# Patient Record
Sex: MALE | Race: WHITE | NOT HISPANIC OR LATINO | Employment: PART TIME | URBAN - METROPOLITAN AREA
[De-identification: names, ages, dates, MRNs, and addresses within clinical notes are randomized per-mention and may not be internally consistent; named-entity substitution may affect disease eponyms.]

---

## 2024-08-25 ENCOUNTER — HOSPITAL ENCOUNTER (INPATIENT)
Facility: HOSPITAL | Age: 79
End: 2024-08-25
Attending: EMERGENCY MEDICINE | Admitting: STUDENT IN AN ORGANIZED HEALTH CARE EDUCATION/TRAINING PROGRAM
Payer: COMMERCIAL

## 2024-08-25 VITALS
TEMPERATURE: 97.2 F | BODY MASS INDEX: 32.2 KG/M2 | SYSTOLIC BLOOD PRESSURE: 138 MMHG | RESPIRATION RATE: 24 BRPM | DIASTOLIC BLOOD PRESSURE: 88 MMHG | OXYGEN SATURATION: 95 % | WEIGHT: 230 LBS | HEART RATE: 90 BPM | HEIGHT: 71 IN

## 2024-08-25 DIAGNOSIS — S81.809A MULTIPLE OPEN WOUNDS OF LOWER LEG, UNSPECIFIED LATERALITY, INITIAL ENCOUNTER: ICD-10-CM

## 2024-08-25 DIAGNOSIS — D62 ABLA (ACUTE BLOOD LOSS ANEMIA): ICD-10-CM

## 2024-08-25 DIAGNOSIS — M79.89 LEFT UPPER EXTREMITY SWELLING: ICD-10-CM

## 2024-08-25 DIAGNOSIS — M79.89 OTHER SPECIFIED SOFT TISSUE DISORDERS: ICD-10-CM

## 2024-08-25 DIAGNOSIS — R93.1 ABNORMAL FINDINGS ON DIAGNOSTIC IMAGING OF HEART AND CORONARY CIRCULATION: ICD-10-CM

## 2024-08-25 DIAGNOSIS — I50.9 CONGESTIVE HEART FAILURE, UNSPECIFIED HF CHRONICITY, UNSPECIFIED HEART FAILURE TYPE: ICD-10-CM

## 2024-08-25 DIAGNOSIS — R06.02 SOB (SHORTNESS OF BREATH): ICD-10-CM

## 2024-08-25 DIAGNOSIS — I50.9 ACUTE DECOMPENSATED HEART FAILURE: Primary | ICD-10-CM

## 2024-08-25 DIAGNOSIS — I46.9 CARDIAC ARREST: ICD-10-CM
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
## 2024-08-25 DIAGNOSIS — I50.21 ACUTE HFREF (HEART FAILURE WITH REDUCED EJECTION FRACTION): ICD-10-CM

## 2024-08-25 DIAGNOSIS — K92.1 MELENA: ICD-10-CM

## 2024-08-25 DIAGNOSIS — S40.022A CONTUSION OF LEFT UPPER EXTREMITY, INITIAL ENCOUNTER: ICD-10-CM

## 2024-08-25 DIAGNOSIS — R60.0 LOCALIZED EDEMA: ICD-10-CM

## 2024-08-25 LAB
ABO GROUP (TYPE) IN BLOOD: NORMAL
ALBUMIN SERPL BCP-MCNC: 3 G/DL (ref 3.4–5)
ALP SERPL-CCNC: 180 U/L (ref 33–136)
ALT SERPL W P-5'-P-CCNC: 38 U/L (ref 10–52)
AMPHETAMINES UR QL SCN: ABNORMAL
ANION GAP SERPL CALC-SCNC: 18 MMOL/L (ref 10–20)
ANTIBODY SCREEN: NORMAL
APPEARANCE UR: CLEAR
AST SERPL W P-5'-P-CCNC: 36 U/L (ref 9–39)
BARBITURATES UR QL SCN: ABNORMAL
BASOPHILS # BLD AUTO: 0.02 X10*3/UL (ref 0–0.1)
BASOPHILS NFR BLD AUTO: 0.1 %
BENZODIAZ UR QL SCN: ABNORMAL
BILIRUB SERPL-MCNC: 0.7 MG/DL (ref 0–1.2)
BILIRUB UR STRIP.AUTO-MCNC: NEGATIVE MG/DL
BNP SERPL-MCNC: 2182 PG/ML (ref 0–99)
BUN SERPL-MCNC: 68 MG/DL (ref 6–23)
BZE UR QL SCN: ABNORMAL
CALCIUM SERPL-MCNC: 8.1 MG/DL (ref 8.6–10.6)
CANNABINOIDS UR QL SCN: ABNORMAL
CHLORIDE SERPL-SCNC: 106 MMOL/L (ref 98–107)
CHLORIDE UR-SCNC: <15 MMOL/L
CHLORIDE/CREATININE (MMOL/G) IN URINE: NORMAL
CHOLEST SERPL-MCNC: 84 MG/DL (ref 0–199)
CHOLESTEROL/HDL RATIO: 3.2
CK SERPL-CCNC: 292 U/L (ref 0–325)
CO2 SERPL-SCNC: 19 MMOL/L (ref 21–32)
COLOR UR: ABNORMAL
CREAT SERPL-MCNC: 2.2 MG/DL (ref 0.5–1.3)
CREAT UR-MCNC: 82.3 MG/DL (ref 20–370)
CRP SERPL-MCNC: 5.5 MG/DL
EGFRCR SERPLBLD CKD-EPI 2021: 30 ML/MIN/1.73M*2
EOSINOPHIL # BLD AUTO: 0.01 X10*3/UL (ref 0–0.4)
EOSINOPHIL NFR BLD AUTO: 0.1 %
ERYTHROCYTE [DISTWIDTH] IN BLOOD BY AUTOMATED COUNT: 15.2 % (ref 11.5–14.5)
ERYTHROCYTE [DISTWIDTH] IN BLOOD BY AUTOMATED COUNT: 15.4 % (ref 11.5–14.5)
ERYTHROCYTE [SEDIMENTATION RATE] IN BLOOD BY WESTERGREN METHOD: 34 MM/H (ref 0–20)
EST. AVERAGE GLUCOSE BLD GHB EST-MCNC: 143 MG/DL
FENTANYL+NORFENTANYL UR QL SCN: ABNORMAL
FERRITIN SERPL-MCNC: 40 NG/ML (ref 20–300)
GLUCOSE SERPL-MCNC: 117 MG/DL (ref 74–99)
GLUCOSE UR STRIP.AUTO-MCNC: NORMAL MG/DL
HBA1C MFR BLD: 6.6 %
HCT VFR BLD AUTO: 21.8 % (ref 41–52)
HCT VFR BLD AUTO: 28 % (ref 41–52)
HDLC SERPL-MCNC: 26.3 MG/DL
HGB BLD-MCNC: 7.2 G/DL (ref 13.5–17.5)
HGB BLD-MCNC: 8.9 G/DL (ref 13.5–17.5)
HIV 1+2 AB+HIV1 P24 AG SERPL QL IA: NONREACTIVE
HOLD SPECIMEN: NORMAL
IMM GRANULOCYTES # BLD AUTO: 0.17 X10*3/UL (ref 0–0.5)
IMM GRANULOCYTES NFR BLD AUTO: 1 % (ref 0–0.9)
IRON SATN MFR SERPL: 8 % (ref 25–45)
IRON SERPL-MCNC: 28 UG/DL (ref 35–150)
KETONES UR STRIP.AUTO-MCNC: NEGATIVE MG/DL
LDLC SERPL CALC-MCNC: 36 MG/DL
LEUKOCYTE ESTERASE UR QL STRIP.AUTO: NEGATIVE
LYMPHOCYTES # BLD AUTO: 0.69 X10*3/UL (ref 0.8–3)
LYMPHOCYTES NFR BLD AUTO: 4.2 %
MAGNESIUM SERPL-MCNC: 2.04 MG/DL (ref 1.6–2.4)
MCH RBC QN AUTO: 29.3 PG (ref 26–34)
MCH RBC QN AUTO: 29.4 PG (ref 26–34)
MCHC RBC AUTO-ENTMCNC: 31.8 G/DL (ref 32–36)
MCHC RBC AUTO-ENTMCNC: 33 G/DL (ref 32–36)
MCV RBC AUTO: 89 FL (ref 80–100)
MCV RBC AUTO: 92 FL (ref 80–100)
METHADONE UR QL SCN: ABNORMAL
MONOCYTES # BLD AUTO: 0.92 X10*3/UL (ref 0.05–0.8)
MONOCYTES NFR BLD AUTO: 5.6 %
MUCOUS THREADS #/AREA URNS AUTO: NORMAL /LPF
NEUTROPHILS # BLD AUTO: 14.7 X10*3/UL (ref 1.6–5.5)
NEUTROPHILS NFR BLD AUTO: 89 %
NITRITE UR QL STRIP.AUTO: NEGATIVE
NON HDL CHOLESTEROL: 58 MG/DL (ref 0–149)
NRBC BLD-RTO: 0 /100 WBCS (ref 0–0)
NRBC BLD-RTO: 0 /100 WBCS (ref 0–0)
OPIATES UR QL SCN: ABNORMAL
OXYCODONE+OXYMORPHONE UR QL SCN: ABNORMAL
PCP UR QL SCN: ABNORMAL
PH UR STRIP.AUTO: 5.5 [PH]
PLATELET # BLD AUTO: 242 X10*3/UL (ref 150–450)
PLATELET # BLD AUTO: 304 X10*3/UL (ref 150–450)
POTASSIUM SERPL-SCNC: 4.4 MMOL/L (ref 3.5–5.3)
POTASSIUM UR-SCNC: 49 MMOL/L
POTASSIUM/CREAT UR-RTO: 60 MMOL/G CREAT
PROT SERPL-MCNC: 6.1 G/DL (ref 6.4–8.2)
PROT UR STRIP.AUTO-MCNC: ABNORMAL MG/DL
RBC # BLD AUTO: 2.45 X10*6/UL (ref 4.5–5.9)
RBC # BLD AUTO: 3.04 X10*6/UL (ref 4.5–5.9)
RBC # UR STRIP.AUTO: NEGATIVE /UL
RBC #/AREA URNS AUTO: NORMAL /HPF
RH FACTOR (ANTIGEN D): NORMAL
SODIUM SERPL-SCNC: 139 MMOL/L (ref 136–145)
SODIUM UR-SCNC: <10 MMOL/L
SODIUM/CREAT UR-RTO: NORMAL
SP GR UR STRIP.AUTO: 1.02
TIBC SERPL-MCNC: 353 UG/DL (ref 240–445)
TRIGL SERPL-MCNC: 109 MG/DL (ref 0–149)
TSH SERPL-ACNC: 0.65 MIU/L (ref 0.44–3.98)
UIBC SERPL-MCNC: 325 UG/DL (ref 110–370)
UROBILINOGEN UR STRIP.AUTO-MCNC: NORMAL MG/DL
VLDL: 22 MG/DL (ref 0–40)
WBC # BLD AUTO: 15.7 X10*3/UL (ref 4.4–11.3)
WBC # BLD AUTO: 16.5 X10*3/UL (ref 4.4–11.3)
WBC #/AREA URNS AUTO: NORMAL /HPF

## 2024-08-25 PROCEDURE — 96367 TX/PROPH/DG ADDL SEQ IV INF: CPT | Mod: 59

## 2024-08-25 PROCEDURE — 83540 ASSAY OF IRON: CPT

## 2024-08-25 PROCEDURE — 12001 RPR S/N/AX/GEN/TRNK 2.5CM/<: CPT | Performed by: EMERGENCY MEDICINE

## 2024-08-25 PROCEDURE — 36415 COLL VENOUS BLD VENIPUNCTURE: CPT

## 2024-08-25 PROCEDURE — 70450 CT HEAD/BRAIN W/O DYE: CPT | Performed by: STUDENT IN AN ORGANIZED HEALTH CARE EDUCATION/TRAINING PROGRAM

## 2024-08-25 PROCEDURE — 84443 ASSAY THYROID STIM HORMONE: CPT

## 2024-08-25 PROCEDURE — 0HQ0XZZ REPAIR SCALP SKIN, EXTERNAL APPROACH: ICD-10-PCS | Performed by: EMERGENCY MEDICINE

## 2024-08-25 PROCEDURE — 73700 CT LOWER EXTREMITY W/O DYE: CPT | Mod: LEFT SIDE | Performed by: RADIOLOGY

## 2024-08-25 PROCEDURE — 96365 THER/PROPH/DIAG IV INF INIT: CPT | Mod: 59

## 2024-08-25 PROCEDURE — 83036 HEMOGLOBIN GLYCOSYLATED A1C: CPT | Performed by: STUDENT IN AN ORGANIZED HEALTH CARE EDUCATION/TRAINING PROGRAM

## 2024-08-25 PROCEDURE — 90471 IMMUNIZATION ADMIN: CPT

## 2024-08-25 PROCEDURE — 1200000002 HC GENERAL ROOM WITH TELEMETRY DAILY

## 2024-08-25 PROCEDURE — 2500000001 HC RX 250 WO HCPCS SELF ADMINISTERED DRUGS (ALT 637 FOR MEDICARE OP)

## 2024-08-25 PROCEDURE — 2500000004 HC RX 250 GENERAL PHARMACY W/ HCPCS (ALT 636 FOR OP/ED)

## 2024-08-25 PROCEDURE — 96366 THER/PROPH/DIAG IV INF ADDON: CPT | Mod: 59

## 2024-08-25 PROCEDURE — 73620 X-RAY EXAM OF FOOT: CPT | Mod: LEFT SIDE | Performed by: RADIOLOGY

## 2024-08-25 PROCEDURE — 83880 ASSAY OF NATRIURETIC PEPTIDE: CPT

## 2024-08-25 PROCEDURE — 80307 DRUG TEST PRSMV CHEM ANLYZR: CPT | Performed by: STUDENT IN AN ORGANIZED HEALTH CARE EDUCATION/TRAINING PROGRAM

## 2024-08-25 PROCEDURE — 81001 URINALYSIS AUTO W/SCOPE: CPT

## 2024-08-25 PROCEDURE — 90715 TDAP VACCINE 7 YRS/> IM: CPT

## 2024-08-25 PROCEDURE — 80061 LIPID PANEL: CPT | Performed by: STUDENT IN AN ORGANIZED HEALTH CARE EDUCATION/TRAINING PROGRAM

## 2024-08-25 PROCEDURE — 86140 C-REACTIVE PROTEIN: CPT | Performed by: STUDENT IN AN ORGANIZED HEALTH CARE EDUCATION/TRAINING PROGRAM

## 2024-08-25 PROCEDURE — 82728 ASSAY OF FERRITIN: CPT

## 2024-08-25 PROCEDURE — 86901 BLOOD TYPING SEROLOGIC RH(D): CPT

## 2024-08-25 PROCEDURE — 85652 RBC SED RATE AUTOMATED: CPT | Performed by: STUDENT IN AN ORGANIZED HEALTH CARE EDUCATION/TRAINING PROGRAM

## 2024-08-25 PROCEDURE — 82570 ASSAY OF URINE CREATININE: CPT

## 2024-08-25 PROCEDURE — 99285 EMERGENCY DEPT VISIT HI MDM: CPT

## 2024-08-25 PROCEDURE — 71045 X-RAY EXAM CHEST 1 VIEW: CPT | Performed by: RADIOLOGY

## 2024-08-25 PROCEDURE — 85027 COMPLETE CBC AUTOMATED: CPT

## 2024-08-25 PROCEDURE — 71046 X-RAY EXAM CHEST 2 VIEWS: CPT | Performed by: RADIOLOGY

## 2024-08-25 PROCEDURE — 85025 COMPLETE CBC W/AUTO DIFF WBC: CPT

## 2024-08-25 PROCEDURE — 73502 X-RAY EXAM HIP UNI 2-3 VIEWS: CPT | Mod: RIGHT SIDE | Performed by: RADIOLOGY

## 2024-08-25 PROCEDURE — 86923 COMPATIBILITY TEST ELECTRIC: CPT

## 2024-08-25 PROCEDURE — 82550 ASSAY OF CK (CPK): CPT | Performed by: EMERGENCY MEDICINE

## 2024-08-25 PROCEDURE — 87389 HIV-1 AG W/HIV-1&-2 AB AG IA: CPT

## 2024-08-25 PROCEDURE — 99285 EMERGENCY DEPT VISIT HI MDM: CPT | Performed by: EMERGENCY MEDICINE

## 2024-08-25 PROCEDURE — 80053 COMPREHEN METABOLIC PANEL: CPT

## 2024-08-25 PROCEDURE — 99223 1ST HOSP IP/OBS HIGH 75: CPT

## 2024-08-25 PROCEDURE — 83735 ASSAY OF MAGNESIUM: CPT

## 2024-08-25 PROCEDURE — 93010 ELECTROCARDIOGRAM REPORT: CPT | Performed by: EMERGENCY MEDICINE

## 2024-08-25 RX ORDER — THIAMINE HYDROCHLORIDE 100 MG/ML
100 INJECTION, SOLUTION INTRAMUSCULAR; INTRAVENOUS DAILY
Status: COMPLETED | OUTPATIENT
Start: 2024-08-25 | End: 2024-08-28

## 2024-08-25 RX ORDER — VANCOMYCIN HYDROCHLORIDE 1 G/200ML
1000 INJECTION, SOLUTION INTRAVENOUS EVERY 24 HOURS
Status: DISCONTINUED | OUTPATIENT
Start: 2024-08-26 | End: 2024-08-26

## 2024-08-25 RX ORDER — ACETAMINOPHEN 500 MG
5 TABLET ORAL ONCE
Status: COMPLETED | OUTPATIENT
Start: 2024-08-25 | End: 2024-08-25

## 2024-08-25 RX ORDER — LANOLIN ALCOHOL/MO/W.PET/CERES
100 CREAM (GRAM) TOPICAL DAILY
Status: DISCONTINUED | OUTPATIENT
Start: 2024-08-28 | End: 2024-08-27

## 2024-08-25 RX ORDER — VANCOMYCIN HYDROCHLORIDE 1 G/20ML
INJECTION, POWDER, LYOPHILIZED, FOR SOLUTION INTRAVENOUS DAILY PRN
Status: DISCONTINUED | OUTPATIENT
Start: 2024-08-25 | End: 2024-08-26

## 2024-08-25 RX ORDER — VANCOMYCIN HYDROCHLORIDE 1 G/20ML
INJECTION, POWDER, LYOPHILIZED, FOR SOLUTION INTRAVENOUS DAILY PRN
Status: DISCONTINUED | OUTPATIENT
Start: 2024-08-25 | End: 2024-08-25

## 2024-08-25 RX ORDER — LORAZEPAM 2 MG/ML
1 INJECTION INTRAMUSCULAR EVERY 2 HOUR PRN
Status: DISCONTINUED | OUTPATIENT
Start: 2024-08-25 | End: 2024-08-25

## 2024-08-25 RX ORDER — VANCOMYCIN HYDROCHLORIDE 1 G/20ML
INJECTION, POWDER, LYOPHILIZED, FOR SOLUTION INTRAVENOUS DAILY PRN
Status: DISCONTINUED | OUTPATIENT
Start: 2024-08-25 | End: 2024-08-25 | Stop reason: SDUPTHER

## 2024-08-25 RX ORDER — FOLIC ACID 1 MG/1
1 TABLET ORAL DAILY
Status: DISCONTINUED | OUTPATIENT
Start: 2024-08-25 | End: 2024-09-09 | Stop reason: HOSPADM

## 2024-08-25 RX ORDER — LORAZEPAM 2 MG/ML
0.5 INJECTION INTRAMUSCULAR EVERY 2 HOUR PRN
Status: DISCONTINUED | OUTPATIENT
Start: 2024-08-25 | End: 2024-08-25

## 2024-08-25 RX ORDER — MULTIVIT-MIN/IRON FUM/FOLIC AC 7.5 MG-4
1 TABLET ORAL DAILY
Status: DISCONTINUED | OUTPATIENT
Start: 2024-08-25 | End: 2024-09-09 | Stop reason: HOSPADM

## 2024-08-25 RX ORDER — CEFTRIAXONE 2 G/50ML
2 INJECTION, SOLUTION INTRAVENOUS EVERY 24 HOURS
Status: DISCONTINUED | OUTPATIENT
Start: 2024-08-25 | End: 2024-08-26

## 2024-08-25 RX ORDER — VANCOMYCIN HYDROCHLORIDE 1 G/200ML
1000 INJECTION, SOLUTION INTRAVENOUS EVERY 24 HOURS
Status: DISCONTINUED | OUTPATIENT
Start: 2024-08-26 | End: 2024-08-25

## 2024-08-25 RX ORDER — HYDROXYZINE HYDROCHLORIDE 25 MG/1
50 TABLET, FILM COATED ORAL ONCE
Status: COMPLETED | OUTPATIENT
Start: 2024-08-25 | End: 2024-08-25

## 2024-08-25 RX ORDER — ENOXAPARIN SODIUM 100 MG/ML
40 INJECTION SUBCUTANEOUS EVERY 24 HOURS
Status: DISCONTINUED | OUTPATIENT
Start: 2024-08-25 | End: 2024-09-02

## 2024-08-25 RX ORDER — LORAZEPAM 2 MG/ML
0.5 INJECTION INTRAMUSCULAR EVERY 2 HOUR PRN
Status: DISCONTINUED | OUTPATIENT
Start: 2024-08-25 | End: 2024-08-27

## 2024-08-25 RX ORDER — FUROSEMIDE 10 MG/ML
20 INJECTION INTRAMUSCULAR; INTRAVENOUS ONCE
Status: COMPLETED | OUTPATIENT
Start: 2024-08-25 | End: 2024-08-25

## 2024-08-25 RX ORDER — LORAZEPAM 2 MG/ML
2 INJECTION INTRAMUSCULAR EVERY 2 HOUR PRN
Status: DISCONTINUED | OUTPATIENT
Start: 2024-08-25 | End: 2024-08-25

## 2024-08-25 RX ORDER — VANCOMYCIN HYDROCHLORIDE 1 G/200ML
2000 INJECTION, SOLUTION INTRAVENOUS ONCE
Status: COMPLETED | OUTPATIENT
Start: 2024-08-25 | End: 2024-08-25

## 2024-08-25 RX ORDER — LORAZEPAM 2 MG/ML
1 INJECTION INTRAMUSCULAR EVERY 2 HOUR PRN
Status: DISCONTINUED | OUTPATIENT
Start: 2024-08-25 | End: 2024-08-27

## 2024-08-25 RX ORDER — OXYCODONE HYDROCHLORIDE 5 MG/1
5 TABLET ORAL ONCE
Status: COMPLETED | OUTPATIENT
Start: 2024-08-25 | End: 2024-08-25

## 2024-08-25 RX ADMIN — PIPERACILLIN SODIUM AND TAZOBACTAM SODIUM 2.25 G: 2; .25 INJECTION, SOLUTION INTRAVENOUS at 07:44

## 2024-08-25 RX ADMIN — FOLIC ACID 1 MG: 1 TABLET ORAL at 15:32

## 2024-08-25 RX ADMIN — VANCOMYCIN HYDROCHLORIDE 2000 MG: 1 INJECTION, SOLUTION INTRAVENOUS at 08:24

## 2024-08-25 RX ADMIN — TETANUS TOXOID, REDUCED DIPHTHERIA TOXOID AND ACELLULAR PERTUSSIS VACCINE, ADSORBED 0.5 ML: 5; 2.5; 8; 8; 2.5 SUSPENSION INTRAMUSCULAR at 05:21

## 2024-08-25 RX ADMIN — Medication 5 MG: at 22:04

## 2024-08-25 RX ADMIN — FUROSEMIDE 20 MG: 10 INJECTION, SOLUTION INTRAMUSCULAR; INTRAVENOUS at 13:24

## 2024-08-25 RX ADMIN — Medication 1 TABLET: at 15:32

## 2024-08-25 RX ADMIN — OXYCODONE HYDROCHLORIDE 5 MG: 5 TABLET ORAL at 07:44

## 2024-08-25 RX ADMIN — FUROSEMIDE 20 MG: 10 INJECTION, SOLUTION INTRAMUSCULAR; INTRAVENOUS at 22:04

## 2024-08-25 RX ADMIN — THIAMINE HYDROCHLORIDE 100 MG: 100 INJECTION, SOLUTION INTRAMUSCULAR; INTRAVENOUS at 15:31

## 2024-08-25 RX ADMIN — HYDROXYZINE HYDROCHLORIDE 50 MG: 25 TABLET ORAL at 22:04

## 2024-08-25 RX ADMIN — CEFTRIAXONE SODIUM 2 G: 2 INJECTION, SOLUTION INTRAVENOUS at 14:52

## 2024-08-25 SDOH — SOCIAL STABILITY: SOCIAL INSECURITY: HAVE YOU HAD ANY THOUGHTS OF HARMING ANYONE ELSE?: NO

## 2024-08-25 SDOH — SOCIAL STABILITY: SOCIAL INSECURITY: DO YOU FEEL ANYONE HAS EXPLOITED OR TAKEN ADVANTAGE OF YOU FINANCIALLY OR OF YOUR PERSONAL PROPERTY?: NO

## 2024-08-25 SDOH — SOCIAL STABILITY: SOCIAL INSECURITY: HAS ANYONE EVER THREATENED TO HURT YOUR FAMILY OR YOUR PETS?: NO

## 2024-08-25 SDOH — SOCIAL STABILITY: SOCIAL INSECURITY: HAVE YOU HAD THOUGHTS OF HARMING ANYONE ELSE?: NO

## 2024-08-25 SDOH — SOCIAL STABILITY: SOCIAL INSECURITY: DOES ANYONE TRY TO KEEP YOU FROM HAVING/CONTACTING OTHER FRIENDS OR DOING THINGS OUTSIDE YOUR HOME?: NO

## 2024-08-25 SDOH — SOCIAL STABILITY: SOCIAL INSECURITY: DO YOU FEEL UNSAFE GOING BACK TO THE PLACE WHERE YOU ARE LIVING?: NO

## 2024-08-25 SDOH — SOCIAL STABILITY: SOCIAL INSECURITY: WERE YOU ABLE TO COMPLETE ALL THE BEHAVIORAL HEALTH SCREENINGS?: YES

## 2024-08-25 SDOH — SOCIAL STABILITY: SOCIAL INSECURITY: ARE THERE ANY APPARENT SIGNS OF INJURIES/BEHAVIORS THAT COULD BE RELATED TO ABUSE/NEGLECT?: NO

## 2024-08-25 SDOH — SOCIAL STABILITY: SOCIAL INSECURITY: ABUSE: ADULT

## 2024-08-25 SDOH — SOCIAL STABILITY: SOCIAL INSECURITY: ARE YOU OR HAVE YOU BEEN THREATENED OR ABUSED PHYSICALLY, EMOTIONALLY, OR SEXUALLY BY ANYONE?: NO

## 2024-08-25 ASSESSMENT — COGNITIVE AND FUNCTIONAL STATUS - GENERAL
DAILY ACTIVITIY SCORE: 13
HELP NEEDED FOR BATHING: A LOT
STANDING UP FROM CHAIR USING ARMS: TOTAL
PATIENT BASELINE BEDBOUND: NO
WALKING IN HOSPITAL ROOM: TOTAL
DAILY ACTIVITIY SCORE: 13
MOBILITY SCORE: 8
EATING MEALS: A LITTLE
MOVING FROM LYING ON BACK TO SITTING ON SIDE OF FLAT BED WITH BEDRAILS: A LOT
TURNING FROM BACK TO SIDE WHILE IN FLAT BAD: A LOT
CLIMB 3 TO 5 STEPS WITH RAILING: TOTAL
PERSONAL GROOMING: A LOT
TURNING FROM BACK TO SIDE WHILE IN FLAT BAD: A LOT
MOBILITY SCORE: 8
DRESSING REGULAR UPPER BODY CLOTHING: A LOT
TOILETING: A LOT
HELP NEEDED FOR BATHING: A LOT
MOVING TO AND FROM BED TO CHAIR: TOTAL
EATING MEALS: A LITTLE
DRESSING REGULAR LOWER BODY CLOTHING: A LOT
STANDING UP FROM CHAIR USING ARMS: TOTAL
WALKING IN HOSPITAL ROOM: TOTAL
DRESSING REGULAR UPPER BODY CLOTHING: A LOT
MOVING TO AND FROM BED TO CHAIR: TOTAL
TOILETING: A LOT
MOVING FROM LYING ON BACK TO SITTING ON SIDE OF FLAT BED WITH BEDRAILS: A LOT
DRESSING REGULAR LOWER BODY CLOTHING: A LOT
CLIMB 3 TO 5 STEPS WITH RAILING: TOTAL
PERSONAL GROOMING: A LOT

## 2024-08-25 ASSESSMENT — ACTIVITIES OF DAILY LIVING (ADL)
BATHING: INDEPENDENT
HEARING - LEFT EAR: FUNCTIONAL
LACK_OF_TRANSPORTATION: NO
ADEQUATE_TO_COMPLETE_ADL: YES
DRESSING YOURSELF: INDEPENDENT
FEEDING YOURSELF: INDEPENDENT
TOILETING: INDEPENDENT
WALKS IN HOME: INDEPENDENT
PATIENT'S MEMORY ADEQUATE TO SAFELY COMPLETE DAILY ACTIVITIES?: YES
GROOMING: INDEPENDENT
HEARING - RIGHT EAR: FUNCTIONAL
JUDGMENT_ADEQUATE_SAFELY_COMPLETE_DAILY_ACTIVITIES: YES

## 2024-08-25 ASSESSMENT — PAIN - FUNCTIONAL ASSESSMENT
PAIN_FUNCTIONAL_ASSESSMENT: 0-10

## 2024-08-25 ASSESSMENT — LIFESTYLE VARIABLES
HAVE PEOPLE ANNOYED YOU BY CRITICIZING YOUR DRINKING: NO
HOW OFTEN DURING THE LAST YEAR HAVE YOU FAILED TO DO WHAT WAS NORMALLY EXPECTED FROM YOU BECAUSE OF DRINKING: NEVER
HOW MANY STANDARD DRINKS CONTAINING ALCOHOL DO YOU HAVE ON A TYPICAL DAY: 1 OR 2
ANXIETY: NO ANXIETY, AT EASE
AGITATION: NORMAL ACTIVITY
ORIENTATION AND CLOUDING OF SENSORIUM: ORIENTED AND CAN DO SERIAL ADDITIONS
AUDIT TOTAL SCORE: 4
HOW OFTEN DURING THE LAST YEAR HAVE YOU BEEN UNABLE TO REMEMBER WHAT HAPPENED THE NIGHT BEFORE BECAUSE YOU HAD BEEN DRINKING: NEVER
PAROXYSMAL SWEATS: NO SWEAT VISIBLE
HOW OFTEN DURING THE LAST YEAR HAVE YOU HAD A FEELING OF GUILT OR REMORSE AFTER DRINKING: NEVER
PAROXYSMAL SWEATS: NO SWEAT VISIBLE
NAUSEA AND VOMITING: NO NAUSEA AND NO VOMITING
ORIENTATION AND CLOUDING OF SENSORIUM: ORIENTED AND CAN DO SERIAL ADDITIONS
HAS A RELATIVE, FRIEND, DOCTOR, OR ANOTHER HEALTH PROFESSIONAL EXPRESSED CONCERN ABOUT YOUR DRINKING OR SUGGESTED YOU CUT DOWN: NO
EVER HAD A DRINK FIRST THING IN THE MORNING TO STEADY YOUR NERVES TO GET RID OF A HANGOVER: NO
TOTAL SCORE: 0
HOW OFTEN DURING THE LAST YEAR HAVE YOU NEEDED AN ALCOHOLIC DRINK FIRST THING IN THE MORNING TO GET YOURSELF GOING AFTER A NIGHT OF HEAVY DRINKING: NEVER
PAROXYSMAL SWEATS: NO SWEAT VISIBLE
HEADACHE, FULLNESS IN HEAD: NOT PRESENT
HOW OFTEN DO YOU HAVE A DRINK CONTAINING ALCOHOL: 4 OR MORE TIMES A WEEK
SKIP TO QUESTIONS 9-10: 1
ANXIETY: NO ANXIETY, AT EASE
AUDITORY DISTURBANCES: NOT PRESENT
HOW OFTEN DO YOU HAVE 6 OR MORE DRINKS ON ONE OCCASION: NEVER
TREMOR: NO TREMOR
AUDIT-C TOTAL SCORE: 4
NAUSEA AND VOMITING: NO NAUSEA AND NO VOMITING
HEADACHE, FULLNESS IN HEAD: NOT PRESENT
TOTAL SCORE: 0
ORIENTATION AND CLOUDING OF SENSORIUM: ORIENTED AND CAN DO SERIAL ADDITIONS
HEADACHE, FULLNESS IN HEAD: NOT PRESENT
EVER FELT BAD OR GUILTY ABOUT YOUR DRINKING: NO
AGITATION: NORMAL ACTIVITY
AUDIT TOTAL SCORE: 0
TREMOR: NO TREMOR
TOTAL SCORE: 0
PULSE: 94
VISUAL DISTURBANCES: NOT PRESENT
AUDIT-C TOTAL SCORE: 4
HAVE YOU EVER FELT YOU SHOULD CUT DOWN ON YOUR DRINKING: NO
HOW OFTEN DURING THE LAST YEAR HAVE YOU FOUND THAT YOU WERE NOT ABLE TO STOP DRINKING ONCE YOU HAD STARTED: NEVER
VISUAL DISTURBANCES: NOT PRESENT
AUDITORY DISTURBANCES: NOT PRESENT
HAVE YOU OR SOMEONE ELSE BEEN INJURED AS A RESULT OF YOUR DRINKING: NO
AGITATION: NORMAL ACTIVITY
NAUSEA AND VOMITING: NO NAUSEA AND NO VOMITING
AUDITORY DISTURBANCES: NOT PRESENT
VISUAL DISTURBANCES: NOT PRESENT
ANXIETY: NO ANXIETY, AT EASE
TOTAL SCORE: 0
TREMOR: NO TREMOR

## 2024-08-25 ASSESSMENT — COLUMBIA-SUICIDE SEVERITY RATING SCALE - C-SSRS
6. HAVE YOU EVER DONE ANYTHING, STARTED TO DO ANYTHING, OR PREPARED TO DO ANYTHING TO END YOUR LIFE?: NO
1. IN THE PAST MONTH, HAVE YOU WISHED YOU WERE DEAD OR WISHED YOU COULD GO TO SLEEP AND NOT WAKE UP?: NO
2. HAVE YOU ACTUALLY HAD ANY THOUGHTS OF KILLING YOURSELF?: NO

## 2024-08-25 ASSESSMENT — PATIENT HEALTH QUESTIONNAIRE - PHQ9
2. FEELING DOWN, DEPRESSED OR HOPELESS: NOT AT ALL
SUM OF ALL RESPONSES TO PHQ9 QUESTIONS 1 & 2: 0
1. LITTLE INTEREST OR PLEASURE IN DOING THINGS: NOT AT ALL

## 2024-08-25 ASSESSMENT — PAIN SCALES - GENERAL
PAINLEVEL_OUTOF10: 0 - NO PAIN

## 2024-08-25 NOTE — ED PROVIDER NOTES
HPI   Chief Complaint   Patient presents with    Fall     Secondary to lower edema/weakness.       Yohan Dykes is a 80 yo M with no known PMH presenting after mechanical fall at home. Patient was leaning over too far and fell and hit his head on the ground. He has laceration in his hairline on his scalp. He reports significant dyspnea on exertion and difficulty ambulating that has been worsening. He reports his problems all started back in March when after eating ramen noodles he reports an allergic reaction where his legs began swelling and he developed an infection over both lower legs. Since March he has been wrapping his legs and trying to dress them to clear the infection. He reports intermittently seeing maggots in the dressing and changing them every 3-4 days. He currently has a dull pain in his legs and tightness from the edema. He denies any pain in his head or any other injuries. He has not seen a doctor in many years. He takes no medications. He lives alone and works from home.              Patient History   No past medical history on file.  No past surgical history on file.  No family history on file.  Social History     Tobacco Use    Smoking status: Not on file    Smokeless tobacco: Not on file   Substance Use Topics    Alcohol use: Not on file    Drug use: Not on file       Physical Exam   ED Triage Vitals [08/25/24 0415]   Temp Heart Rate Respirations BP   -- 100 20 (!) 144/96      Pulse Ox Temp Source Heart Rate Source Patient Position   94 % Temporal Monitor Lying      BP Location FiO2 (%)     Right arm 28 %       Physical Exam  Constitutional:       General: He is not in acute distress.  HENT:      Head:      Comments: Less than 1cm laceration on scalp just above hairline on forehead. Oozing blood with small pulsation.     Nose: Nose normal.      Mouth/Throat:      Mouth: Mucous membranes are dry.   Eyes:      Extraocular Movements: Extraocular movements intact.   Cardiovascular:      Rate and  Rhythm: Normal rate and regular rhythm.      Heart sounds: Normal heart sounds.   Pulmonary:      Effort: Pulmonary effort is normal.      Breath sounds: Normal breath sounds.   Abdominal:      General: Abdomen is flat.      Palpations: Abdomen is soft.   Musculoskeletal:      Right lower leg: Edema present.      Left lower leg: Edema present.      Comments: Significant edema up to waist bilaterally.   Dry flaky skin on R lower leg  First layer of skin pulled off from L lower leg.   Wounds on both feet and toes, with maggots on L foot.  Pulses intact   Skin:     General: Skin is warm and dry.   Neurological:      General: No focal deficit present.      Mental Status: He is alert and oriented to person, place, and time.           ED Course & MDM   Diagnoses as of 08/25/24 1525   Fall, initial encounter   Multiple open wounds of lower leg, unspecified laterality, initial encounter   Acute decompensated heart failure (Multi)     EKG: rate 100, sinus rhythm, normal axis. RBBB, prolonged QTC at 534, evidence of prior anteroseptal infarct, no prior to compare            No data recorded     Vinay Coma Scale Score: 15 (08/25/24 0418 : Ludy Christianson RN)                           Medical Decision Making  Patient is a 80 yo M with no known PMH presenting after mechanical fall at home. Hit head, no LOC, no thinners. Patient also noted worsening edema and infections in his legs since March. Has also had worsening dyspnea and difficulty ambulation from the edema. Denies chest pain, headache, abdominal pain, n/v. Reports pain in his legs. On exam patient afebrile and satting well on room air but was briefly put on oxygen for comfort. Less than 1 cm laceration on frontal head just above hairline. Cardiopulmonary exam benign. Patient had significant edema up to his abdomen. Also has dry, peeling skin on both lower legs and feet. With chronic appearing wounds on feet, worse on L with some maggots, pulses and sensation intact. CT  head negative. Chest x ray revealed large R sided pleural effusion. CBC with WBC 16.5 and hgb 8.9. BNP 2182. CMP with bicarb 19, Cr 2.20, low albumin and total protein, alk phos 180. Patient with obvious volume overload and edema with difficulty ambulating and potentially unsafe home environment giving he lives alone and delayed seeking care. Concern for heart failure and kidney disease given lab and exam findings. Will get x ray L foot to check for osteo, start patient on vanc and zosyn for broad antibiotic coverage, and admit to medicine for further workup. He will need to have his stitch removed from his head laceration in 7-10 days.        Procedure  Patient received 1 simple interrupted suture in his head laceration that was placed by Dr. Inna Clark. Lidocaine with epinephrine was used to numb the area and hemostasis was achieved after placing the suture.            Gerson Arguello MD  Resident  08/25/24 8792

## 2024-08-25 NOTE — PROGRESS NOTES
Vancomycin Dosing by Pharmacy- Cessation of Therapy    Consult to pharmacy for vancomycin dosing has been discontinued by the prescriber, pharmacy will sign off at this time.    Please call pharmacy if there are further questions or re-enter a consult if vancomycin is resumed.     Sharon QuirozD       Vanc was restarted. So pharmacy will continue to follow up    Sharon GonzalezD

## 2024-08-25 NOTE — ED PROVIDER NOTES
"History of Present Illness     History provided by: Patient  Limitations to History: None  External Records Reviewed: None available    HPI:  Yohan Dykes is a 79 y.o. male ***    Physical Exam   Triage vitals:  T      BP (!) 144/96  RR 20  O2 94 % Supplemental oxygen        Medical Decision Making & ED Course   Medical Decision Makin y.o. male hemodynamically stable presents following a fall.  ----         Social Determinants of Health which Significantly Impact Care: {Social Determinants of Health which Significantly Impact Care:37965::\"None identified\"} {The following actions were taken to address these social determinants:06210}      Chronic conditions affecting the patient's care: See HPI    The patient was discussed with the following consultants/services: Please see ED course for consult transcript        ED Course:     Disposition   {ED Disposition:75042}    Procedures   Procedures    Patient was seen and discussed with the attending of record.    Zachary Velazco MD  Emergency Medicine  "

## 2024-08-25 NOTE — ED TRIAGE NOTES
[place] [facility] [place], pt calls EMS after falling, without recovery, fall takes place around 1800 8/24/24. Pt states he should have called earlier but couldn't walk to get to a phone. Pt states he has been having worsening lower leg edema, weakness ans SOB. Pt endorses hitting forehead on floor, lac noted.   -thinner, -LOC, Possible failure to thrive, lives alone.    Pt is Aox3 upon admission, vitals stable.

## 2024-08-25 NOTE — CONSULTS
Vancomycin Dosing by Pharmacy- INITIAL    Yohan Dykes is a 79 y.o. year old male who Pharmacy has been consulted for vancomycin dosing for cellulitis, skin and soft tissue. Based on the patient's indication and renal status this patient will be dosed based on a goal AUC of 400-600.     Renal function is currently stable. Unclear what patient's baseline is as patient has not seen a MD in many  years. Suspect if true SSTI, has had infection for some time (?chronic), with elevated leukocytes but afebrile (?altered immune response given possible chronic infection and elder age).     Visit Vitals  /84 (BP Location: Right arm, Patient Position: Lying)   Pulse 98   Temp 36.3 °C (97.4 °F) (Temporal)   Resp 20        Lab Results   Component Value Date    CREATININE 2.20 (H) 2024      Patient weight is as follows:   Vitals:    24 0415   Weight: 104 kg (230 lb)     Cultures:  No results found for the encounter in last 14 days.      No intake/output data recorded.  I/O during current shift:  No intake/output data recorded.    Temp (24hrs), Av.3 °C (97.4 °F), Min:36.3 °C (97.4 °F), Max:36.3 °C (97.4 °F)     Assessment/Plan     Patient will be given a loading dose of 2000 mg (To get to AUC within 24-28 hours)  Will initiate vancomycin maintenance,  1000 mg every 24 hours.    This dosing regimen is predicted by [employer] to result in the following pharmacokinetic parameters:  Loading dose: 2000 mg at 08:00 2024.  Regimen: 1000 mg IV every 24 hours.  Start time: 08:00 on 2024  Exposure target: AUC24 (range)400-600 mg/L.hr   AUC24,ss: 518 mg/L.hr  Probability of AUC24 > 400: 77 %  Ctrough,ss: 17.6 mg/L  Probability of Ctrough,ss > 20: 38 %  Probability of nephrotoxicity (Lodise RADHA ): 13 %    Follow-up level will be ordered on  at 0600h to assess for clearance and see Scr trend and will reflect trough level in case switch to DBL; unless clinically indicated sooner.  Will continue to monitor  renal function daily while on vancomycin and order serum creatinine at least every 48 hours if not already ordered.  Follow for continued vancomycin needs, clinical response, and signs/symptoms of toxicity.     Peace Jhoansen, PharmD

## 2024-08-25 NOTE — H&P
"History Of Present Illness  Yohan Dykes is a 79 y.o. male presenting with fall without loss of consciousness. He has an unknown past medical history.    He was brought to the ED via EMS. They were called for fall and head laceration. Patient reported to EMS that he was using office chair with wheels and fell and hit his head. He could not get up for 9 hours. EMS forcibly entered and noted odorous and unhygienic living situation with bugs and flies on the patient.     Mr. Dykes reports that he was a his baseline state of health until Jan 2024. He denies any history of chest pain or cardiac event, or any health issues to his knowledge. He does not see a doctor or take prescription medications. He reports that in January he thought he had the flu. He reports difficulty sleeping due to discomfort from his abdomen being distended and he was waking up in the night with difficulty breathing and coughing. Sleeping upright helped him. He had some sputum production. He reports that he got an oximeter for home use and would sometimes have saturations to the 80s lying flat. He reports that because of discomfort associated with abdomen size he had to reduce his meal volume. These were issues he had never had before. During this time he reports no chest pain, palpitations, or LOC. He started taking Advil \"Dual Action\" (ibuprofen-acetaminophen combined formulation) q4-6h but would try to stretch it out to q12h and 1200mg mucinex daily (did not mentioned formulation - note: sometimes these have phenylephrine and ibuprofen-acetaminophen as well).     On March 27th he ate ramen soup and noticed acute swelling in his legs that concerned him, and he thought he was having an allergic reaction to sodium. He notes that when we would walk around he would have increased WOB and feel lightheaded and need to sit down for a few minutes to regain himself. Denies any  LOC. He started using rolling office chairs to get around in his house. He " "had trouble making it to the bathroom few a times with loose stool, and started taking \"pepto\" daily to help him. He reports one BM a day and has noticed no blood. He also reports difficulty making it to the restroom in time to urinate and was using multiple hand urinals at home. He reports urinating 3-4x a day and has noticed no blood in the urine or pain with urination. He denies any loss of consciousness since January and with regard to his fall that brought him in today.     In the ED his vitals were remarkable for mild hypertension 144/96, respiratory rate of 20 and poor oxygenation and he was put on 2L NC. Labs notable for leukocytosis to 16.5, hemoglobin 8.9 (no known baseline), Cr 2.20 (no known baseline), BUN 68, Alk Phos 180, CRP 5.5, ESR 34, BNP >2000, A1c 6.1, UA notable for proteinuria to 30. CXR demonstrating bilateral pleural effusions R>L. Left foot XR suspicious for soft tissue gases. He was put on Zosyn and Vancomycin. No cultures were collected. CT head negative and he received 1 stitch for his head laceration.      Seeing him this morning he reports no chest pain, palpitations. He is having ongoing SOB but improved in an upright position and with 2L NC. He was able to hold a conversation for 5-10 minutes on room air with saturations in the mid 90s with normal WOB. Saturation to 92 and put back on NC. He says if he were to be angled back he would have much more difficulty breathing. He endorses some pain in the R foot but is able to move both feet bilaterally at the ankle without wincing or significant increase in pain. Tender to palpation. A bedside ultrasound of the heart and abdomen was performed. Doppler pulses of the L lower extremity confirmed.       Past Medical History  He has no past medical history on file.  -Patient denies significant medical history and no prescribed medications at home and reports being at his baseline since January.   -Reports previous a multiple time a week runner " "until leg and back pain made him more sedentary.   -He says he is lactose intolerant.   -He mentioned having taken antibiotics prior to dental appointments before    Home meds  None reported. Was taking pepto and advil/tylenol daily, frequent use of mucinex of unknown formulation as per HPI above.     Surgical History  He has no past surgical history on file.     Social History  He has no history on file for tobacco use, alcohol use, and drug use.  -Patient reports 50 pack year history. Quit in 2014.   -Patient mentioned previous drinking and recently reducing the amount he was drinking.   -Photo shown of home interior potentially concerning for maintaining safe living situation at home.   -He has no children or spouse (not ). He reports 10 siblings, and mentioned closest siblings who live near by Ivory \"Mo\" and Skyla. Skyla's phone number taken.   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      Family History  No family history on file.  Congestive heart failure in father     Allergies  Patient has no known allergies.  Patient reports no medication allergies. Mentioned shots related to penicillin remotely, but confirmed no allergy.     Review of Systems     Physical Exam  Constitutional:       General: He is not in acute distress.     Appearance: He is obese. He is ill-appearing.   HENT:      Head: Normocephalic.      Comments: Patient has laceration and dried blood at the top of his forehead to the right near hairline.      Nose: No rhinorrhea.   Eyes:      Extraocular Movements: Extraocular movements intact.      Conjunctiva/sclera: Conjunctivae normal.      Pupils: Pupils are equal, round, and reactive to light.   Cardiovascular:      Rate and Rhythm: Normal rate and regular rhythm.      Comments: Heart sounds are distant. Dorsalis pedis artery obtained via doppler.  Pulmonary:      Effort: Pulmonary effort is normal. No respiratory distress. "      Breath sounds: No stridor. Rales present. No wheezing or rhonchi.      Comments: Faint breath sounds diffusely, R worse than L. No breath sounds R base. Soft crackles/rales bilaterally. No wheezing or stridor.    Abdominal:      General: There is distension.      Tenderness: There is no abdominal tenderness. There is no guarding.      Comments: Abdomen is grossly distended and firm to touch diffused. Bedside ultrasound did not show impressive fluid collection. Some small collection the R.    Musculoskeletal:         General: Signs of injury present.      Cervical back: Normal range of motion.      Right lower leg: Edema present.      Left lower leg: Edema present.      Comments: Severe bilateral lower extremity edema to waist with skin abnormalities, some sloughing and dusky/dark appearance. Redness obvious on L pretibial area, with ulceration at anterior ankle. R foot 3rd and 4th digit dark in appearance. Doralis pedial pulses obtained via doppler. Maggots reported by ED team on L foot.      Neurological:      General: No focal deficit present.      Mental Status: He is alert.      Comments: Oriented and without FND spontaneous to conversation.    Psychiatric:         Mood and Affect: Mood normal.         Behavior: Behavior normal.      Comments: Patient was able to deliver history in a clear and logical way, and acknowledged that he was late to seek care. Unclear if patient has insight into how concerning the appearance of his lower legs are c/o reason for seeking care today (fall with bleeding and not the severe swelling. pain and immobility he has been having ongoing for weeks to months). He showed a picture of his legs in March when he first noted swelling, and legs at that time looked very dusky and dark, severely abnormal, which I believe most reasonable people would seek care for versus belief that it is an allergy to salt. Patient using smart phone with difficulty. Attention to question of who to reach  out in emergency was impaired. Patient recalls recent death of 2 brothers he was close to. Gave phone number of one sister but declined for us to reach out to inform her of admission at this time.           Last Recorded Vitals  /88   Pulse 95   Temp 36.3 °C (97.4 °F) (Temporal)   Resp 15   Wt 104 kg (230 lb)   SpO2 100%       Relevant Results  Results for orders placed or performed during the hospital encounter of 08/25/24 (from the past 96 hour(s))   CBC and Auto Differential   Result Value Ref Range    WBC 16.5 (H) 4.4 - 11.3 x10*3/uL    nRBC 0.0 0.0 - 0.0 /100 WBCs    RBC 3.04 (L) 4.50 - 5.90 x10*6/uL    Hemoglobin 8.9 (L) 13.5 - 17.5 g/dL    Hematocrit 28.0 (L) 41.0 - 52.0 %    MCV 92 80 - 100 fL    MCH 29.3 26.0 - 34.0 pg    MCHC 31.8 (L) 32.0 - 36.0 g/dL    RDW 15.4 (H) 11.5 - 14.5 %    Platelets 304 150 - 450 x10*3/uL    Neutrophils % 89.0 40.0 - 80.0 %    Immature Granulocytes %, Automated 1.0 (H) 0.0 - 0.9 %    Lymphocytes % 4.2 13.0 - 44.0 %    Monocytes % 5.6 2.0 - 10.0 %    Eosinophils % 0.1 0.0 - 6.0 %    Basophils % 0.1 0.0 - 2.0 %    Neutrophils Absolute 14.70 (H) 1.60 - 5.50 x10*3/uL    Immature Granulocytes Absolute, Automated 0.17 0.00 - 0.50 x10*3/uL    Lymphocytes Absolute 0.69 (L) 0.80 - 3.00 x10*3/uL    Monocytes Absolute 0.92 (H) 0.05 - 0.80 x10*3/uL    Eosinophils Absolute 0.01 0.00 - 0.40 x10*3/uL    Basophils Absolute 0.02 0.00 - 0.10 x10*3/uL   Magnesium   Result Value Ref Range    Magnesium 2.04 1.60 - 2.40 mg/dL   B-type natriuretic peptide   Result Value Ref Range    BNP 2,182 (H) 0 - 99 pg/mL   Comprehensive metabolic panel   Result Value Ref Range    Glucose 117 (H) 74 - 99 mg/dL    Sodium 139 136 - 145 mmol/L    Potassium 4.4 3.5 - 5.3 mmol/L    Chloride 106 98 - 107 mmol/L    Bicarbonate 19 (L) 21 - 32 mmol/L    Anion Gap 18 10 - 20 mmol/L    Urea Nitrogen 68 (H) 6 - 23 mg/dL    Creatinine 2.20 (H) 0.50 - 1.30 mg/dL    eGFR 30 (L) >60 mL/min/1.73m*2    Calcium 8.1 (L)  8.6 - 10.6 mg/dL    Albumin 3.0 (L) 3.4 - 5.0 g/dL    Alkaline Phosphatase 180 (H) 33 - 136 U/L    Total Protein 6.1 (L) 6.4 - 8.2 g/dL    AST 36 9 - 39 U/L    Bilirubin, Total 0.7 0.0 - 1.2 mg/dL    ALT 38 10 - 52 U/L   Creatine Kinase   Result Value Ref Range    Creatine Kinase 292 0 - 325 U/L   C-reactive protein   Result Value Ref Range    C-Reactive Protein 5.50 (H) <1.00 mg/dL   Sedimentation rate, automated   Result Value Ref Range    Sedimentation Rate 34 (H) 0 - 20 mm/h   Hemoglobin A1c   Result Value Ref Range    Hemoglobin A1C 6.6 (H) see below %    Estimated Average Glucose 143 Not Established mg/dL   Lipid panel   Result Value Ref Range    Cholesterol 84 0 - 199 mg/dL    HDL-Cholesterol 26.3 mg/dL    Cholesterol/HDL Ratio 3.2     LDL Calculated 36 <=99 mg/dL    VLDL 22 0 - 40 mg/dL    Triglycerides 109 0 - 149 mg/dL    Non HDL Cholesterol 58 0 - 149 mg/dL   Iron and TIBC   Result Value Ref Range    Iron 28 (L) 35 - 150 ug/dL    UIBC 325 110 - 370 ug/dL    TIBC 353 240 - 445 ug/dL    % Saturation 8 (L) 25 - 45 %   Ferritin   Result Value Ref Range    Ferritin 40 20 - 300 ng/mL   Urine electrolytes   Result Value Ref Range    Sodium, Urine Random <10 mmol/L    Sodium/Creatinine Ratio      Potassium, Urine Random 49 mmol/L    Potassium/Creatinine Ratio 60 Not established mmol/g Creat    Chloride, Urine Random <15 mmol/L    Chloride/Creatinine Ratio      Creatinine, Urine Random 82.3 20.0 - 370.0 mg/dL   Urinalysis with Reflex Culture and Microscopic   Result Value Ref Range    Color, Urine Light-Yellow Light-Yellow, Yellow, Dark-Yellow    Appearance, Urine Clear Clear    Specific Gravity, Urine 1.018 1.005 - 1.035    pH, Urine 5.5 5.0, 5.5, 6.0, 6.5, 7.0, 7.5, 8.0    Protein, Urine 30 (1+) (A) NEGATIVE, 10 (TRACE), 20 (TRACE) mg/dL    Glucose, Urine Normal Normal mg/dL    Blood, Urine NEGATIVE NEGATIVE    Ketones, Urine NEGATIVE NEGATIVE mg/dL    Bilirubin, Urine NEGATIVE NEGATIVE    Urobilinogen, Urine  Normal Normal mg/dL    Nitrite, Urine NEGATIVE NEGATIVE    Leukocyte Esterase, Urine NEGATIVE NEGATIVE   Urinalysis Microscopic   Result Value Ref Range    WBC, Urine NONE 1-5, NONE /HPF    RBC, Urine NONE NONE, 1-2, 3-5 /HPF    Mucus, Urine FEW Reference range not established. /LPF   HIV 1/2 Antigen/Antibody Screen with Reflex to Confirmation   Result Value Ref Range    HIV 1/2 Antigen/Antibody Screen with Reflex to Confirmation Nonreactive Nonreactive   TSH with reflex to Free T4 if abnormal   Result Value Ref Range    Thyroid Stimulating Hormone 0.65 0.44 - 3.98 mIU/L     XR foot left 1-2 views 8/25/2024  1. Suspected soft tissue gas of the left forefoot raising concern for deep soft tissue infection. 2. Generalized soft tissue swelling extends along the dorsum of the foot and into the ankle and lower leg. 3. Possible skin wound of the dorsum of the forefoot. MRI can be considered to assess for deep soft tissue infection and or osteomyelitis.     CT foot left wo IV contrast 8/25/2024  1. Diffuse soft tissue swelling and edema with skin thickening and skin surface ulceration of the dorsal midfoot. No underlying soft tissue gas or collection to confirm the suspicion on recent plain film. These findings are most suspicious for superficial soft tissue infection/cellulitis.   2. No erosive change or other acute osseous abnormality to suggest osteomyelitis.      XR chest 2 views 8/25/2024  Bilateral pleural effusions and likely bilateral basilar airspace disease right worse than left. Findings could represent some edema.         XR chest 1 view 8/25/2024  1.  Large right-sided pleural effusion with likely adjacent airspace disease. 2. Left lung appears clear. No pneumothorax.      CT head wo IV contrast 8/25/2024  1. No acute cortical infarct or acute intracranial hemorrhage. 2. Frontal scalp laceration without underlying osseous abnormality.          Assessment/Plan     Dinesh Mccall is a 79 year old man with  unknown medical history presenting for fall with laceration in apparent acute decompensated heart failure of unclear etiology. He has severe bilateral LE edema with chronic skin changes and L dorsal mid foot cellulitis likely secondary to ulceration per CT read. Patient has signs via EMS, ED, primary team concerning for failure to thrive and social isolation. He is admitted for stabilization and work up of his cardiopulmonary status and cellulitis infection. He will need help from social work and likely rehab given long period of deconditioning.        # Acute Decompensated Heart Failure, unclear etiology   # Right Bundle Branch Block  # Dyspnea   # Mild ascites?  # Bilateral Pleural Effusion  # Severe Bilateral LE edema    Patient reports no history of heart failure or cardiac event. No history of chest pain or heart attack. Some concern for reliability given unclear social status. His history and presentation is consistent with acute decompensated heart failure of unknown etiology. BNP >2000. His lipid panel and A1c normal and absence of pain lowers likelihood of ischemic cardiomyopathy, however it cannot be ruled out. RBBB may indicate an ischemic event  vs cor pulmonale s/o 50 pack year smoking history vs amyloidosis. Patient mentioned history of drinking and recent death of two brothers, alcoholic induced cardiomyopathy also possible. If patient has viral syndrome a post-viral syndrome also possible. While unlikely given length of presentation subchronic endocarditis is also possible. Admission weight 230lb.   - F/u TTE  - F/u LELA with doppler   - Continuous Telemetry  - Strict I&O with daily weights  - Begin diuresis IV lasix 20mg, assess and consider redosing.  ---Daily NN goal 1-1.5L  - A1C 6.6%; Lipid panel negative  - HIV wnl  - TSH wnl  - UDS positive for oxycodone (given oxycodone in ED prior to UA) otherwise negative    #L lower extremity infection, likely cellulitis per CT  Patient has LE edema  bilaterally with more redness on the left. No pus noted. XR L Foot was suspicious for soft tissue gases. There is an ulceration on the anterior ankle but no open wound or evidence of pus. Culture were not taken prior to starting Vanc and Zosyn. Follow up CT negative for deep space infection or osteo.  - Daily CBC, trend inflammatory markers  - Dc zosyn  - Start ceftriaxone 2g q24h   - Continue Vanc, pharmacist to dose  - F/u lower extremity doppler     #Mechanical Post fall   #Low hemoglobin last 7.2   CT Head negative. Patient complaining of R hip swelling this afternoon. Denies pain, but hyperventilated on palpation. Hemoglobin 8.9 AM-> 7.2 PM   - T&S in process   - s/p 1 stitch for forehead laceration notable blood loss  - F/u Right Hip XR     #KIA vs CKD  #Cr 2.2  Possibly pre-renal related to ADHF presentation. High protein in urine, consider nephrotic syndrome, intrinsic injury in context of ongoing ibuprofen use.   - Avoid nephrotoxic medications  - Follow up urine lytes    #Alcohol use  Patient mentioned multiple shots a day to help him sleep, later said one one shot with soda. He endorses drinking to drowsiness every night. Elevated alk phos.  - CIWA protocol    #Disposition  #Mental Status  Patient likely not safe to go home. Will need social work and APS check on his house. Contact with family will need to be made. During admission once medically stable further pyschiatric and neurological assessment should be performed.        P: Be careful given ADHF  E: PRN  N: NPO pending need for procedure  GI PPX: none  DVT PPX: SCDs, Lovenox ordered but held for bleeding risk  NOK: Skyla Kincaid (Sister) [phone] or [phone]     Tomasa Harper MD PGY1

## 2024-08-26 LAB
ALBUMIN SERPL BCP-MCNC: 2.5 G/DL (ref 3.4–5)
ALBUMIN SERPL BCP-MCNC: 2.7 G/DL (ref 3.4–5)
ALP SERPL-CCNC: 129 U/L (ref 33–136)
ALT SERPL W P-5'-P-CCNC: 27 U/L (ref 10–52)
ANION GAP SERPL CALC-SCNC: 17 MMOL/L (ref 10–20)
ANION GAP SERPL CALC-SCNC: 18 MMOL/L (ref 10–20)
AORTIC VALVE MEAN GRADIENT: 7 MMHG
AORTIC VALVE PEAK VELOCITY: 1.81 M/S
AST SERPL W P-5'-P-CCNC: 23 U/L (ref 9–39)
ATRIAL RATE: 100 BPM
AV PEAK GRADIENT: 13.1 MMHG
AVA (PEAK VEL): 2.39 CM2
AVA (VTI): 2.25 CM2
BILIRUB SERPL-MCNC: 0.5 MG/DL (ref 0–1.2)
BUN SERPL-MCNC: 73 MG/DL (ref 6–23)
BUN SERPL-MCNC: 76 MG/DL (ref 6–23)
CALCIUM SERPL-MCNC: 7.9 MG/DL (ref 8.6–10.6)
CALCIUM SERPL-MCNC: 7.9 MG/DL (ref 8.6–10.6)
CHLORIDE SERPL-SCNC: 105 MMOL/L (ref 98–107)
CHLORIDE SERPL-SCNC: 106 MMOL/L (ref 98–107)
CO2 SERPL-SCNC: 18 MMOL/L (ref 21–32)
CO2 SERPL-SCNC: 20 MMOL/L (ref 21–32)
CREAT SERPL-MCNC: 2.48 MG/DL (ref 0.5–1.3)
CREAT SERPL-MCNC: 2.59 MG/DL (ref 0.5–1.3)
CRP SERPL-MCNC: 6.42 MG/DL
EGFRCR SERPLBLD CKD-EPI 2021: 25 ML/MIN/1.73M*2
EGFRCR SERPLBLD CKD-EPI 2021: 26 ML/MIN/1.73M*2
EJECTION FRACTION APICAL 4 CHAMBER: 25.8
EJECTION FRACTION: 18 %
ERYTHROCYTE [DISTWIDTH] IN BLOOD BY AUTOMATED COUNT: 15.8 % (ref 11.5–14.5)
ERYTHROCYTE [SEDIMENTATION RATE] IN BLOOD BY WESTERGREN METHOD: 12 MM/H (ref 0–20)
GLUCOSE BLD MANUAL STRIP-MCNC: 123 MG/DL (ref 74–99)
GLUCOSE SERPL-MCNC: 122 MG/DL (ref 74–99)
GLUCOSE SERPL-MCNC: 134 MG/DL (ref 74–99)
HCT VFR BLD AUTO: 24.2 % (ref 41–52)
HGB BLD-MCNC: 7.3 G/DL (ref 13.5–17.5)
LEFT ATRIUM VOLUME AREA LENGTH INDEX BSA: 58.4 ML/M2
LEFT VENTRICLE INTERNAL DIMENSION DIASTOLE: 7.3 CM (ref 3.5–6)
LEFT VENTRICULAR OUTFLOW TRACT DIAMETER: 2.3 CM
MAGNESIUM SERPL-MCNC: 1.98 MG/DL (ref 1.6–2.4)
MCH RBC QN AUTO: 29.7 PG (ref 26–34)
MCHC RBC AUTO-ENTMCNC: 30.2 G/DL (ref 32–36)
MCV RBC AUTO: 98 FL (ref 80–100)
MITRAL VALVE E/A RATIO: 1.45
NRBC BLD-RTO: 0 /100 WBCS (ref 0–0)
P AXIS: 54 DEGREES
P OFFSET: 193 MS
P ONSET: 141 MS
PHOSPHATE SERPL-MCNC: 5.7 MG/DL (ref 2.5–4.9)
PLATELET # BLD AUTO: 292 X10*3/UL (ref 150–450)
POTASSIUM SERPL-SCNC: 4.1 MMOL/L (ref 3.5–5.3)
POTASSIUM SERPL-SCNC: 4.2 MMOL/L (ref 3.5–5.3)
PR INTERVAL: 166 MS
PROT SERPL-MCNC: 5.1 G/DL (ref 6.4–8.2)
Q ONSET: 224 MS
QRS COUNT: 16 BEATS
QRS DURATION: 150 MS
QT INTERVAL: 414 MS
QTC CALCULATION(BAZETT): 534 MS
QTC FREDERICIA: 491 MS
R AXIS: 22 DEGREES
RBC # BLD AUTO: 2.46 X10*6/UL (ref 4.5–5.9)
RIGHT VENTRICLE FREE WALL PEAK S': 12.7 CM/S
RIGHT VENTRICLE PEAK SYSTOLIC PRESSURE: 45.9 MMHG
SODIUM SERPL-SCNC: 137 MMOL/L (ref 136–145)
SODIUM SERPL-SCNC: 139 MMOL/L (ref 136–145)
T AXIS: 0 DEGREES
T OFFSET: 431 MS
TRICUSPID ANNULAR PLANE SYSTOLIC EXCURSION: 3.1 CM
VANCOMYCIN SERPL-MCNC: 13.8 UG/ML (ref 5–20)
VENTRICULAR RATE: 100 BPM
WBC # BLD AUTO: 14.6 X10*3/UL (ref 4.4–11.3)

## 2024-08-26 PROCEDURE — 2500000002 HC RX 250 W HCPCS SELF ADMINISTERED DRUGS (ALT 637 FOR MEDICARE OP, ALT 636 FOR OP/ED)

## 2024-08-26 PROCEDURE — 2500000005 HC RX 250 GENERAL PHARMACY W/O HCPCS

## 2024-08-26 PROCEDURE — 1200000002 HC GENERAL ROOM WITH TELEMETRY DAILY

## 2024-08-26 PROCEDURE — 2500000001 HC RX 250 WO HCPCS SELF ADMINISTERED DRUGS (ALT 637 FOR MEDICARE OP)

## 2024-08-26 PROCEDURE — 80053 COMPREHEN METABOLIC PANEL: CPT

## 2024-08-26 PROCEDURE — 80202 ASSAY OF VANCOMYCIN: CPT

## 2024-08-26 PROCEDURE — 99233 SBSQ HOSP IP/OBS HIGH 50: CPT

## 2024-08-26 PROCEDURE — 93975 VASCULAR STUDY: CPT | Performed by: RADIOLOGY

## 2024-08-26 PROCEDURE — 82947 ASSAY GLUCOSE BLOOD QUANT: CPT

## 2024-08-26 PROCEDURE — 2500000004 HC RX 250 GENERAL PHARMACY W/ HCPCS (ALT 636 FOR OP/ED)

## 2024-08-26 PROCEDURE — 2500000004 HC RX 250 GENERAL PHARMACY W/ HCPCS (ALT 636 FOR OP/ED): Performed by: NUTRITIONIST

## 2024-08-26 PROCEDURE — 36415 COLL VENOUS BLD VENIPUNCTURE: CPT

## 2024-08-26 PROCEDURE — 83735 ASSAY OF MAGNESIUM: CPT

## 2024-08-26 PROCEDURE — 76705 ECHO EXAM OF ABDOMEN: CPT | Performed by: RADIOLOGY

## 2024-08-26 PROCEDURE — 86140 C-REACTIVE PROTEIN: CPT

## 2024-08-26 PROCEDURE — 85027 COMPLETE CBC AUTOMATED: CPT

## 2024-08-26 PROCEDURE — 80069 RENAL FUNCTION PANEL: CPT | Mod: CCI | Performed by: STUDENT IN AN ORGANIZED HEALTH CARE EDUCATION/TRAINING PROGRAM

## 2024-08-26 PROCEDURE — 85652 RBC SED RATE AUTOMATED: CPT

## 2024-08-26 PROCEDURE — 36415 COLL VENOUS BLD VENIPUNCTURE: CPT | Performed by: STUDENT IN AN ORGANIZED HEALTH CARE EDUCATION/TRAINING PROGRAM

## 2024-08-26 RX ORDER — ACETAMINOPHEN 500 MG
5 TABLET ORAL DAILY
Status: DISCONTINUED | OUTPATIENT
Start: 2024-08-27 | End: 2024-08-27

## 2024-08-26 RX ORDER — SULFAMETHOXAZOLE AND TRIMETHOPRIM 800; 160 MG/1; MG/1
1 TABLET ORAL EVERY 12 HOURS SCHEDULED
Status: DISCONTINUED | OUTPATIENT
Start: 2024-08-26 | End: 2024-09-01

## 2024-08-26 RX ORDER — FUROSEMIDE 10 MG/ML
20 INJECTION INTRAMUSCULAR; INTRAVENOUS ONCE
Status: DISCONTINUED | OUTPATIENT
Start: 2024-08-26 | End: 2024-08-26

## 2024-08-26 RX ORDER — FUROSEMIDE 10 MG/ML
20 INJECTION INTRAMUSCULAR; INTRAVENOUS ONCE
Status: COMPLETED | OUTPATIENT
Start: 2024-08-26 | End: 2024-08-26

## 2024-08-26 RX ORDER — SULFAMETHOXAZOLE AND TRIMETHOPRIM 800; 160 MG/1; MG/1
2 TABLET ORAL EVERY 12 HOURS SCHEDULED
Status: DISCONTINUED | OUTPATIENT
Start: 2024-08-26 | End: 2024-08-26

## 2024-08-26 RX ADMIN — VANCOMYCIN HYDROCHLORIDE 1750 MG: 5 INJECTION, POWDER, LYOPHILIZED, FOR SOLUTION INTRAVENOUS at 10:42

## 2024-08-26 RX ADMIN — THIAMINE HYDROCHLORIDE 100 MG: 100 INJECTION, SOLUTION INTRAMUSCULAR; INTRAVENOUS at 10:42

## 2024-08-26 RX ADMIN — FUROSEMIDE 20 MG: 10 INJECTION, SOLUTION INTRAMUSCULAR; INTRAVENOUS at 10:42

## 2024-08-26 RX ADMIN — PERFLUTREN 1.5 ML OF DILUTION: 6.52 INJECTION, SUSPENSION INTRAVENOUS at 09:14

## 2024-08-26 RX ADMIN — Medication 1 TABLET: at 10:42

## 2024-08-26 RX ADMIN — SULFAMETHOXAZOLE AND TRIMETHOPRIM 1 TABLET: 800; 160 TABLET ORAL at 20:16

## 2024-08-26 RX ADMIN — ENOXAPARIN SODIUM 40 MG: 100 INJECTION SUBCUTANEOUS at 10:50

## 2024-08-26 RX ADMIN — Medication 5 MG: at 22:23

## 2024-08-26 RX ADMIN — FOLIC ACID 1 MG: 1 TABLET ORAL at 10:42

## 2024-08-26 RX ADMIN — FUROSEMIDE 20 MG: 10 INJECTION, SOLUTION INTRAMUSCULAR; INTRAVENOUS at 18:47

## 2024-08-26 RX ADMIN — CEFTRIAXONE SODIUM 2 G: 2 INJECTION, SOLUTION INTRAVENOUS at 14:28

## 2024-08-26 ASSESSMENT — LIFESTYLE VARIABLES
NAUSEA AND VOMITING: NO NAUSEA AND NO VOMITING
ANXIETY: NO ANXIETY, AT EASE
TOTAL SCORE: 0
TREMOR: NO TREMOR
TOTAL SCORE: 0
AGITATION: NORMAL ACTIVITY
TOTAL SCORE: 0
ANXIETY: NO ANXIETY, AT EASE
AGITATION: NORMAL ACTIVITY
ORIENTATION AND CLOUDING OF SENSORIUM: ORIENTED AND CAN DO SERIAL ADDITIONS
ORIENTATION AND CLOUDING OF SENSORIUM: ORIENTED AND CAN DO SERIAL ADDITIONS
NAUSEA AND VOMITING: NO NAUSEA AND NO VOMITING
PAROXYSMAL SWEATS: NO SWEAT VISIBLE
TREMOR: NO TREMOR
ORIENTATION AND CLOUDING OF SENSORIUM: ORIENTED AND CAN DO SERIAL ADDITIONS
VISUAL DISTURBANCES: NOT PRESENT
ANXIETY: NO ANXIETY, AT EASE
ANXIETY: NO ANXIETY, AT EASE
TREMOR: NO TREMOR
ORIENTATION AND CLOUDING OF SENSORIUM: ORIENTED AND CAN DO SERIAL ADDITIONS
PAROXYSMAL SWEATS: NO SWEAT VISIBLE
AGITATION: NORMAL ACTIVITY
ANXIETY: NO ANXIETY, AT EASE
TOTAL SCORE: 0
NAUSEA AND VOMITING: NO NAUSEA AND NO VOMITING
AUDITORY DISTURBANCES: NOT PRESENT
HEADACHE, FULLNESS IN HEAD: NOT PRESENT
AUDITORY DISTURBANCES: NOT PRESENT
VISUAL DISTURBANCES: NOT PRESENT
PAROXYSMAL SWEATS: NO SWEAT VISIBLE
ANXIETY: NO ANXIETY, AT EASE
NAUSEA AND VOMITING: NO NAUSEA AND NO VOMITING
TREMOR: NO TREMOR
TOTAL SCORE: 0
AUDITORY DISTURBANCES: NOT PRESENT
ANXIETY: NO ANXIETY, AT EASE
HEADACHE, FULLNESS IN HEAD: NOT PRESENT
NAUSEA AND VOMITING: NO NAUSEA AND NO VOMITING
PAROXYSMAL SWEATS: NO SWEAT VISIBLE
AUDITORY DISTURBANCES: NOT PRESENT
AUDITORY DISTURBANCES: NOT PRESENT
HEADACHE, FULLNESS IN HEAD: NOT PRESENT
TREMOR: NO TREMOR
VISUAL DISTURBANCES: NOT PRESENT
AGITATION: NORMAL ACTIVITY
AUDITORY DISTURBANCES: NOT PRESENT
VISUAL DISTURBANCES: NOT PRESENT
NAUSEA AND VOMITING: NO NAUSEA AND NO VOMITING
PAROXYSMAL SWEATS: NO SWEAT VISIBLE
HEADACHE, FULLNESS IN HEAD: NOT PRESENT
ORIENTATION AND CLOUDING OF SENSORIUM: ORIENTED AND CAN DO SERIAL ADDITIONS
VISUAL DISTURBANCES: NOT PRESENT
TREMOR: NO TREMOR
ANXIETY: NO ANXIETY, AT EASE
HEADACHE, FULLNESS IN HEAD: NOT PRESENT
NAUSEA AND VOMITING: NO NAUSEA AND NO VOMITING
ORIENTATION AND CLOUDING OF SENSORIUM: ORIENTED AND CAN DO SERIAL ADDITIONS
NAUSEA AND VOMITING: NO NAUSEA AND NO VOMITING
VISUAL DISTURBANCES: NOT PRESENT
AUDITORY DISTURBANCES: NOT PRESENT
VISUAL DISTURBANCES: NOT PRESENT
PAROXYSMAL SWEATS: NO SWEAT VISIBLE
AUDITORY DISTURBANCES: NOT PRESENT
AGITATION: NORMAL ACTIVITY
HEADACHE, FULLNESS IN HEAD: NOT PRESENT
HEADACHE, FULLNESS IN HEAD: NOT PRESENT
TOTAL SCORE: 0
PAROXYSMAL SWEATS: NO SWEAT VISIBLE
HEADACHE, FULLNESS IN HEAD: NOT PRESENT
VISUAL DISTURBANCES: NOT PRESENT
AGITATION: NORMAL ACTIVITY
HEADACHE, FULLNESS IN HEAD: NOT PRESENT
ORIENTATION AND CLOUDING OF SENSORIUM: ORIENTED AND CAN DO SERIAL ADDITIONS
PAROXYSMAL SWEATS: NO SWEAT VISIBLE
PAROXYSMAL SWEATS: NO SWEAT VISIBLE
ANXIETY: NO ANXIETY, AT EASE
TOTAL SCORE: 0
PAROXYSMAL SWEATS: NO SWEAT VISIBLE
VISUAL DISTURBANCES: NOT PRESENT
AGITATION: NORMAL ACTIVITY
PULSE: 87
NAUSEA AND VOMITING: NO NAUSEA AND NO VOMITING
TREMOR: NO TREMOR
HEADACHE, FULLNESS IN HEAD: NOT PRESENT
TREMOR: NO TREMOR
ORIENTATION AND CLOUDING OF SENSORIUM: ORIENTED AND CAN DO SERIAL ADDITIONS
TREMOR: NO TREMOR
TOTAL SCORE: 0
TOTAL SCORE: 0
VISUAL DISTURBANCES: NOT PRESENT
TREMOR: NO TREMOR
AUDITORY DISTURBANCES: NOT PRESENT
AGITATION: NORMAL ACTIVITY
ANXIETY: NO ANXIETY, AT EASE
AUDITORY DISTURBANCES: NOT PRESENT
AGITATION: NORMAL ACTIVITY
TOTAL SCORE: 0
AGITATION: NORMAL ACTIVITY
NAUSEA AND VOMITING: NO NAUSEA AND NO VOMITING

## 2024-08-26 ASSESSMENT — PAIN SCALES - GENERAL
PAINLEVEL_OUTOF10: 0 - NO PAIN

## 2024-08-26 ASSESSMENT — COGNITIVE AND FUNCTIONAL STATUS - GENERAL
TOILETING: A LOT
MOVING FROM LYING ON BACK TO SITTING ON SIDE OF FLAT BED WITH BEDRAILS: A LOT
EATING MEALS: A LITTLE
DRESSING REGULAR UPPER BODY CLOTHING: A LOT
DAILY ACTIVITIY SCORE: 13
DRESSING REGULAR LOWER BODY CLOTHING: A LOT
HELP NEEDED FOR BATHING: A LOT
DRESSING REGULAR UPPER BODY CLOTHING: A LOT
DAILY ACTIVITIY SCORE: 13
STANDING UP FROM CHAIR USING ARMS: TOTAL
CLIMB 3 TO 5 STEPS WITH RAILING: TOTAL
DRESSING REGULAR LOWER BODY CLOTHING: A LOT
CLIMB 3 TO 5 STEPS WITH RAILING: TOTAL
MOVING TO AND FROM BED TO CHAIR: TOTAL
PERSONAL GROOMING: A LOT
HELP NEEDED FOR BATHING: A LOT
WALKING IN HOSPITAL ROOM: TOTAL
MOBILITY SCORE: 8
TOILETING: A LOT
WALKING IN HOSPITAL ROOM: TOTAL
TURNING FROM BACK TO SIDE WHILE IN FLAT BAD: A LOT
MOBILITY SCORE: 8
MOVING TO AND FROM BED TO CHAIR: TOTAL
MOVING FROM LYING ON BACK TO SITTING ON SIDE OF FLAT BED WITH BEDRAILS: A LOT
EATING MEALS: A LITTLE
TURNING FROM BACK TO SIDE WHILE IN FLAT BAD: A LOT
PERSONAL GROOMING: A LOT
STANDING UP FROM CHAIR USING ARMS: TOTAL

## 2024-08-26 ASSESSMENT — PAIN - FUNCTIONAL ASSESSMENT
PAIN_FUNCTIONAL_ASSESSMENT: 0-10

## 2024-08-26 NOTE — CARE PLAN
The patient's goals for the shift include      The clinical goals for the shift include Patient will be safe and have a restful night    Problem: Pain - Adult  Goal: Verbalizes/displays adequate comfort level or baseline comfort level  Outcome: Progressing     Problem: Safety - Adult  Goal: Free from fall injury  Outcome: Progressing

## 2024-08-26 NOTE — PROGRESS NOTES
Vancomycin Dosing by Pharmacy- FOLLOW UP    Dinesh Mccall is a 79 y.o. year old male who Pharmacy has been consulted for vancomycin dosing for cellulitis, skin and soft tissue. Based on the patient's indication and renal status this patient is being dosed based on a goal trough/random level of 10-15.     Renal function is currently unstable. IKA on CKD.    Last vancomycin dose: 2000 mg given once    Most recent random level: 13.8 mcg/mL    Visit Vitals  /77   Pulse 86   Temp 36.4 °C (97.5 °F)   Resp 18        Lab Results   Component Value Date    CREATININE 2.59 (H) 2024    CREATININE 2.20 (H) 2024        Patient weight is as follows:   Vitals:    24 0415   Weight: 104 kg (230 lb)       Cultures:  No results found for the encounter in last 14 days.       I/O last 3 completed shifts:  In: 60 (0.6 mL/kg) [P.O.:50; I.V.:10 (0.1 mL/kg)]  Out: 1100 (10.5 mL/kg) [Urine:1100 (0.3 mL/kg/hr)]  Weight: 104.3 kg   I/O during current shift:  No intake/output data recorded.    Temp (24hrs), Av.5 °C (97.7 °F), Min:36.2 °C (97.2 °F), Max:36.7 °C (98 °F)      Assessment/Plan    Within goal random/trough level. Ordered vancomycin 1750 mg once.  The next level will be obtained on  at 1000. May be obtained sooner if clinically indicated.   Will continue to monitor renal function daily while on vancomycin and order serum creatinine at least every 48 hours if not already ordered.  Follow for continued vancomycin needs, clinical response, and signs/symptoms of toxicity.       CANDIE HUTTON, PharmD

## 2024-08-26 NOTE — CARE PLAN
Problem: Skin  Goal: Decreased wound size/increased tissue granulation at next dressing change  Outcome: Progressing  Flowsheets (Taken 8/26/2024 0106)  Decreased wound size/increased tissue granulation at next dressing change: Promote sleep for wound healing  Goal: Participates in plan/prevention/treatment measures  Outcome: Progressing  Flowsheets (Taken 8/26/2024 0106)  Participates in plan/prevention/treatment measures: Increase activity/out of bed for meals  Goal: Prevent/manage excess moisture  Outcome: Progressing  Flowsheets (Taken 8/26/2024 0106)  Prevent/manage excess moisture: Cleanse incontinence/protect with barrier cream  Goal: Prevent/minimize sheer/friction injuries  Outcome: Progressing  Flowsheets (Taken 8/26/2024 0106)  Prevent/minimize sheer/friction injuries: HOB 30 degrees or less  Goal: Promote/optimize nutrition  Outcome: Progressing  Flowsheets (Taken 8/26/2024 0106)  Promote/optimize nutrition: Monitor/record intake including meals  Goal: Promote skin healing  Outcome: Progressing  Flowsheets (Taken 8/26/2024 0106)  Promote skin healing: Assess skin/pad under line(s)/device(s)   The patient's goals for the shift include      The clinical goals for the shift include Patient will be safe and have a restful night     Patient states she needs a call back in reference to getting an appt for persistent Flu diagnosis symptoms. Please call to discuss.  Thank you

## 2024-08-26 NOTE — CONSULTS
Wound Care Consult     Visit Date: 8/26/2024      Patient Name: Dinesh Mccall         MRN: [id number]           YOB: 1945     Reason for Consult: Dry denuded skin to BLE and open wounds to bilateral ischium's. Traumatic laceration to frontal lobe of scalp.        Wound History: Yohan Dykes is a 79 y.o. male presenting with fall without loss of consciousness. He has an unknown past medical history.  States he has a history of maggots to BLE from flies in his house. None noted today. He admits to difficulty reaching and cleaning BLE. He states he was trying to  area rugs and clean floor where water had flooded it. States he lost his balance and fell and cut his head. States he sits all day in an office chair and sleeps in the office chair because he cannot breathe in a recliner or lying in his bed. A sacral wound was charted on admission, however, he has a wound to each ischium. Both are most likely pressure and consistent with sitting in an office chair all day and night.      Pertinent Labs:   Albumin   Date Value Ref Range Status   08/26/2024 2.5 (L) 3.4 - 5.0 g/dL Final       Wound Assessment:  Wound 08/25/24 Venous Ulcer Leg Left;Lower (Active)   Wound Image    08/26/24 1230   Site Assessment Red 08/26/24 1230   Isabell-Wound Assessment Pink 08/26/24 1230   Non-staged Wound Description Partial thickness 08/26/24 1230   Shape irreegular 08/26/24 1230   Margins Poorly defined 08/26/24 1230   Drainage Description Serous;Yellow 08/26/24 1230   Drainage Amount Moderate 08/26/24 1230   Dressing Changed New 08/26/24 1230   Dressing Status Clean 08/26/24 1230       Wound 08/25/24 Traumatic Head Medial (Active)   Wound Image   08/26/24 1227   Shape Irregular  08/26/24 1227   Wound Length (cm) 3 cm 08/26/24 1227   Wound Width (cm) 0 cm 08/26/24 1227   Wound Surface Area (cm^2) 0 cm^2 08/26/24 1227   Wound Depth (cm) 0 cm 08/26/24 1227   Wound Volume (cm^3) 0 cm^3 08/26/24 1227   State of Healing  Closed wound edges 08/26/24 1227   Margins Attached edges 08/26/24 1227   Drainage Description None 08/26/24 1227   Drainage Amount None 08/26/24 1227   Dressing Open to air 08/26/24 1227   Dressing Changed Other (Comment) 08/26/24 1227       Wound 08/25/24 Pressure Injury Ischium Right (Active)   Wound Image   08/26/24 1252   Site Assessment Sloughing;Red 08/26/24 1252   Isabell-Wound Assessment Hyperpigmented 08/26/24 1252   Pressure Injury Stage 3 08/26/24 1252   Wound Length (cm) 4 cm 08/26/24 1252   Wound Width (cm) 3 cm 08/26/24 1252   Wound Surface Area (cm^2) 12 cm^2 08/26/24 1252   Wound Depth (cm) 0.2 cm 08/26/24 1252   Wound Volume (cm^3) 2.4 cm^3 08/26/24 1252   Margins Poorly defined 08/26/24 1252   Drainage Description Unable to assess 08/26/24 1252   Dressing Hydrophilic 08/26/24 1252   Dressing Changed New 08/26/24 1227       Wound 08/26/24 Pressure Injury Ischium Left (Active)   Wound Image   08/26/24 1300   Site Assessment Red;[place] 08/26/24 1300   Isabell-Wound Assessment Hyperpigmented 08/26/24 1300   Non-staged Wound Description Partial thickness 08/26/24 1300   Pressure Injury Stage 2 08/26/24 1300   Shape oval 08/26/24 1300   Wound Length (cm) 2 cm 08/26/24 1300   Wound Width (cm) 1 cm 08/26/24 1300   Wound Surface Area (cm^2) 2 cm^2 08/26/24 1300   Wound Depth (cm) 0.1 cm 08/26/24 1300   Wound Volume (cm^3) 0.2 cm^3 08/26/24 1300   Margins Well-defined edges 08/26/24 1300   Drainage Description Unable to assess 08/26/24 1300   Dressing Hydrophilic 08/26/24 1300   Dressing Changed New 08/26/24 1300   Dressing Status Clean 08/26/24 1300       Wound Team Summary Assessment: Patient awake in bed. 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 Patient on 4 L of o2 per NC and struggles for breath with any activity. Wearing an external catheter and incontinent of urine. Unable to assist with turns. BLE very dry and edematous.   Bilateral legs cleaned with soap and water. Lotion applied and a Tubigrip  placed to RLE. Left LE open and weeping anteriorly and posteriorly. Mepilex transfer used to cover all open areas and chux placed underneath to absorb weeping serous fluid.  Scalp cleaned of large amount of dried sanguinous drainage. Comb used to get dried blood from hair.   Patient turned with assist and he has 2 open wounds to bilateral ischium's. Treated with Triad d/t incontinence.    Recommendations:  - Scalp, frontal lobe/sutures- Clean around the sutures with Vashe wound cleanser. Leave MAU  - BLE, Clean both legs with soap and water and washcloth. Dry and Apply Lotion from knee to toes.  Cover open areas to LLE with Mepilex transfer and RLE with Tubigrip from knee to base of toes. Do regimen daily.  - Bilateral ischium's, Clean with cleansing cloths. Apply Triad to bilateral wounds and leave MAU d/t incontinence. ( Please no Mepilex). Change daily or as needed when incontinent.   - While in bed patient should only be on one EHOB air mattress overlay (# [id number]), a fitted sheet, and one EHOB repositioning sheet ( Taps # [id number]) with appropriate white chux. Please do not use brief while patient is resting in bed. TURN PT Q2 HOURS as far onto his side as tolerated. Place one wedge high from shoulders to waist, leave a gap for sacral/coccyx, and place second wedge below level of the wound. Apply EHOB Enrrique niall boots ( #[id number])  to SONJA heels.    Wound Team Plan: Wound team will not continue to follow. Please re consult for worsening of wounds.      SUZANNE GERHARDT, RN, BSN, CWOCN  8/26/2024  3:11 PM

## 2024-08-26 NOTE — PROGRESS NOTES
"Dinesh Mccall is a 79 y.o. male on day 1 of admission presenting with Acute decompensated heart failure (Multi).    Subjective   Today, he is hemodynamically stable. Reported more comfortable. Still have ongoing SOB when moving around but is getting better compared to yesterday. No fever, chest pain, or palpitation. Still having some degree of pain on left lower extremity.       Objective     Physical Exam  Constitutional:       General: He is not in acute distress.     Appearance: He is obese. He is ill-appearing.   HENT:      Head: Normocephalic.      Comments: Patient has laceration and dried blood at the top of his forehead to the right near hairline.      Nose: No rhinorrhea.   Eyes:      Extraocular Movements: Extraocular movements intact.      Conjunctiva/sclera: Conjunctivae normal.      Pupils: Pupils are equal, round, and reactive to light.   Cardiovascular:      Rate and Rhythm: Normal rate and regular rhythm.      Comments: Heart sounds are distant. Dorsalis pedis artery obtained via doppler.  Pulmonary:      Effort: Pulmonary effort is normal. No respiratory distress.      Breath sounds: No stridor. No rales . No wheezing or rhonchi.      Decreased breath sound bilaterally at basal lung field.  Abdominal:      General: There is no distension.     Tenderness: There is no abdominal tenderness, no guarding.   Musculoskeletal:         General: Signs of injury present.      Cervical back: Normal range of motion.      Right lower leg: Edema present 1+.      Left lower leg: Edema present 1+.    Neurological:      General: No focal deficit present.      Mental Status: He is alert.      Comments: Oriented and without FND spontaneous to conversation.    Psychiatric:         Mood and Affect: Mood normal.         Behavior: Behavior normal.     Last Recorded Vitals  Blood pressure 129/77, pulse 86, temperature 36.4 °C (97.5 °F), resp. rate 18, height 1.803 m (5' 11\"), weight 104 kg (230 lb), SpO2 " 98%.  Intake/Output last 3 Shifts:  I/O last 3 completed shifts:  In: 60 (0.6 mL/kg) [P.O.:50; I.V.:10 (0.1 mL/kg)]  Out: 1100 (10.5 mL/kg) [Urine:1100 (0.3 mL/kg/hr)]  Weight: 104.3 kg     Relevant Results  Results for orders placed or performed during the hospital encounter of 08/25/24 (from the past 24 hour(s))   Urine electrolytes   Result Value Ref Range    Sodium, Urine Random <10 mmol/L    Sodium/Creatinine Ratio      Potassium, Urine Random 49 mmol/L    Potassium/Creatinine Ratio 60 Not established mmol/g Creat    Chloride, Urine Random <15 mmol/L    Chloride/Creatinine Ratio      Creatinine, Urine Random 82.3 20.0 - 370.0 mg/dL   Urinalysis with Reflex Culture and Microscopic   Result Value Ref Range    Color, Urine Light-Yellow Light-Yellow, Yellow, Dark-Yellow    Appearance, Urine Clear Clear    Specific Gravity, Urine 1.018 1.005 - 1.035    pH, Urine 5.5 5.0, 5.5, 6.0, 6.5, 7.0, 7.5, 8.0    Protein, Urine 30 (1+) (A) NEGATIVE, 10 (TRACE), 20 (TRACE) mg/dL    Glucose, Urine Normal Normal mg/dL    Blood, Urine NEGATIVE NEGATIVE    Ketones, Urine NEGATIVE NEGATIVE mg/dL    Bilirubin, Urine NEGATIVE NEGATIVE    Urobilinogen, Urine Normal Normal mg/dL    Nitrite, Urine NEGATIVE NEGATIVE    Leukocyte Esterase, Urine NEGATIVE NEGATIVE   Extra Urine Gray Tube   Result Value Ref Range    Extra Tube Hold for add-ons.    Urinalysis Microscopic   Result Value Ref Range    WBC, Urine NONE 1-5, NONE /HPF    RBC, Urine NONE NONE, 1-2, 3-5 /HPF    Mucus, Urine FEW Reference range not established. /LPF   Drug Screen, Urine   Result Value Ref Range    Amphetamine Screen, Urine Presumptive Negative Presumptive Negative    Barbiturate Screen, Urine Presumptive Negative Presumptive Negative    Benzodiazepines Screen, Urine Presumptive Negative Presumptive Negative    Cannabinoid Screen, Urine Presumptive Negative Presumptive Negative    Cocaine Metabolite Screen, Urine Presumptive Negative Presumptive Negative    Fentanyl  Screen, Urine Presumptive Negative Presumptive Negative    Opiate Screen, Urine Presumptive Negative Presumptive Negative    Oxycodone Screen, Urine Presumptive Positive (A) Presumptive Negative    PCP Screen, Urine Presumptive Negative Presumptive Negative    Methadone Screen, Urine Presumptive Negative Presumptive Negative   Type and screen   Result Value Ref Range    ABO TYPE O     Rh TYPE POS     ANTIBODY SCREEN NEG    HIV 1/2 Antigen/Antibody Screen with Reflex to Confirmation   Result Value Ref Range    HIV 1/2 Antigen/Antibody Screen with Reflex to Confirmation Nonreactive Nonreactive   TSH with reflex to Free T4 if abnormal   Result Value Ref Range    Thyroid Stimulating Hormone 0.65 0.44 - 3.98 mIU/L   CBC   Result Value Ref Range    WBC 15.7 (H) 4.4 - 11.3 x10*3/uL    nRBC 0.0 0.0 - 0.0 /100 WBCs    RBC 2.45 (L) 4.50 - 5.90 x10*6/uL    Hemoglobin 7.2 (L) 13.5 - 17.5 g/dL    Hematocrit 21.8 (L) 41.0 - 52.0 %    MCV 89 80 - 100 fL    MCH 29.4 26.0 - 34.0 pg    MCHC 33.0 32.0 - 36.0 g/dL    RDW 15.2 (H) 11.5 - 14.5 %    Platelets 242 150 - 450 x10*3/uL   POCT GLUCOSE   Result Value Ref Range    POCT Glucose 123 (H) 74 - 99 mg/dL   CBC   Result Value Ref Range    WBC 14.6 (H) 4.4 - 11.3 x10*3/uL    nRBC 0.0 0.0 - 0.0 /100 WBCs    RBC 2.46 (L) 4.50 - 5.90 x10*6/uL    Hemoglobin 7.3 (L) 13.5 - 17.5 g/dL    Hematocrit 24.2 (L) 41.0 - 52.0 %    MCV 98 80 - 100 fL    MCH 29.7 26.0 - 34.0 pg    MCHC 30.2 (L) 32.0 - 36.0 g/dL    RDW 15.8 (H) 11.5 - 14.5 %    Platelets 292 150 - 450 x10*3/uL   Comprehensive metabolic panel   Result Value Ref Range    Glucose 122 (H) 74 - 99 mg/dL    Sodium 139 136 - 145 mmol/L    Potassium 4.1 3.5 - 5.3 mmol/L    Chloride 106 98 - 107 mmol/L    Bicarbonate 20 (L) 21 - 32 mmol/L    Anion Gap 17 10 - 20 mmol/L    Urea Nitrogen 73 (H) 6 - 23 mg/dL    Creatinine 2.59 (H) 0.50 - 1.30 mg/dL    eGFR 25 (L) >60 mL/min/1.73m*2    Calcium 7.9 (L) 8.6 - 10.6 mg/dL    Albumin 2.5 (L) 3.4 - 5.0  g/dL    Alkaline Phosphatase 129 33 - 136 U/L    Total Protein 5.1 (L) 6.4 - 8.2 g/dL    AST 23 9 - 39 U/L    Bilirubin, Total 0.5 0.0 - 1.2 mg/dL    ALT 27 10 - 52 U/L   Magnesium   Result Value Ref Range    Magnesium 1.98 1.60 - 2.40 mg/dL   C-reactive protein   Result Value Ref Range    C-Reactive Protein 6.42 (H) <1.00 mg/dL   Vancomycin   Result Value Ref Range    Vancomycin 13.8 5.0 - 20.0 ug/mL   Sedimentation rate, automated   Result Value Ref Range    Sedimentation Rate 12 0 - 20 mm/h      Scheduled medications  cefTRIAXone, 2 g, intravenous, q24h  enoxaparin, 40 mg, subcutaneous, q24h  folic acid, 1 mg, oral, Daily  furosemide, 20 mg, intravenous, Once  multivitamin with minerals, 1 tablet, oral, Daily  perflutren protein A microsphere, 0.5 mL, intravenous, Once in imaging  sulfur hexafluoride microsphr, 2 mL, intravenous, Once in imaging  [START ON 8/28/2024] thiamine, 100 mg, oral, Daily  thiamine, 100 mg, intravenous, Daily  vancomycin, 1,000 mg, intravenous, q24h    Continuous medications     PRN medications  PRN medications: [DISCONTINUED] LORazepam **OR** LORazepam **OR** LORazepam, vancomycin     Imaging results  Transthoracic Echo (8/26/2024)   1. The left ventricular systolic function is severely decreased, with a visually estimated ejection fraction of 15-20%.   2. There is global hypokinesis of the left ventricle with minor regional variations.   3. Spectral Doppler shows a pseudonormal pattern of left ventricular diastolic filling.   4. There is an elevated mean left atrial pressure.   5. Left ventricular cavity size is severely dilated.   6. No left ventricular thrombus visualized.   7. There is mild eccentric left ventricular hypertrophy.   8. There is low normal right ventricular systolic function.   9. Moderately enlarged right ventricle.  10. The left atrium is severely dilated.  11. The right atrium is severely dilated.  12. Moderate mitral valve regurgitation.  13. Moderate to severe  tricuspid regurgitation visualized.  14. Aortic valve sclerosis.  15. Mild to moderately elevated pulmonary artery pressure.  16. There is no evidence of a patent foramen ovale.     XR hip right with pelvis when performed 2 or 3 views (8/25/2024)  Severe bilateral hip osteoarthrosis, right worse than left    CT Foot Left without IV contrast (8/25/2024)  1. Diffuse soft tissue swelling and edema with skin thickening and  skin surface ulceration of the dorsal midfoot. No underlying soft  tissue gas or collection to confirm the suspicion on recent plain  film.. These findings are most suspicious for superficial soft tissue  infection/cellulitis.  2. No erosive change or other acute osseous abnormality to suggest  osteomyelitis.    Vascular US lower extremity venous duplex bilateral (8/26/2024)  - Right Lower Venous: No evidence of acute deep vein thrombus visualized in the right lower extremity. Calf veins were imaged segmentally.  - Left Lower Venous: No evidence of acute deep vein thrombus visualized in the left lower extremity. Calf veins were imaged segmentally.     Assessment/Plan   Dinesh Mccall is a 79 year old man with unknown medical history presenting for fall with laceration in apparent acute decompensated heart failure of unclear etiology. He has severe bilateral LE edema with chronic skin changes and L dorsal mid foot cellulitis likely secondary to ulceration per CT read. Patient has signs via EMS, ED, primary team concerning for failure to thrive and social isolation. He is admitted for stabilization and work up of his cardiopulmonary status and cellulitis infection. He will need help from social work and likely rehab given long period of deconditioning.      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 No history of chest pain or heart attack. Some concern for reliability given unclear social status. His history and presentation is consistent with acute decompensated heart failure of unknown etiology. BNP >2000. His lipid panel and A1c normal and absence of pain lowers likelihood of ischemic cardiomyopathy, however it cannot be ruled out. RBBB may indicate an ischemic event  vs cor pulmonale s/o 50 pack year smoking history vs amyloidosis. Patient mentioned history of drinking and recent death of two brothers, alcoholic induced cardiomyopathy also possible. If patient has viral syndrome a post-viral syndrome also possible. While unlikely given length of presentation subchronic endocarditis is also possible. Admission weight 230lb. A1C 6.6%; Lipid panel negative. HIV and TSH WNL. UDS positive for oxycodone (given oxycodone in ED prior to UA)   - F/u LELA with doppler   - Continuous Telemetry  - Strict I&O with daily weights  - Diuresis IV lasix 20mg --> urine output 1 L/day,     lungs - less congestion, symptoms improved after diuresis  - TTE 8/26/2024: LVEF 15-20%, global hypokinesia of LV, elevated mean LAP, moderate MR, moderate/severe TR, moderately enlarged RV, mild/moderately elevated PAP  Plan   - continue O2 cannula 2 LPM, keep SpO2 >94%  - diuresis with IV lasix 20 mg q 6 hr for 2 doses    (Will follow-up evening RFP)  - may consider cardiology consult (HF, first presentation), plan to     consider starting GDMT if stable serum Cr    # Left lower extremity infection, likely cellulitis per CT  Patient has LE edema bilaterally with more redness on the left. No pus noted. XR L Foot was suspicious for soft tissue gases. There is an ulceration on the anterior ankle but no open wound or evidence of pus. Culture were not taken prior to starting Vanc and Zosyn. Follow up CT negative for deep space infection or osteo. Zosyn Discontinued.  - Currently on Vanc + ceftriazone 2 g IV q 24 hr  -  U/S Doppler DVT: no evidence of acute DVT both legs  - Daily CBC, trend inflammatory markers: CRP 5.5 --> 6.42  - Continue Vanc, pharmacist to dose     # Mechanical Post fall   # Low hemoglobin last 7.2   CT Head negative. Patient complaining of R hip swelling this afternoon. Denies pain, but hyperventilated on palpation. Hemoglobin 8.9 AM-> 7.2 PM -> 7.3 on 8/26. No active bleeding  - T&S in process   - s/p 1 stitch for forehead laceration notable blood loss  - F/u Right Hip XR      # KIA vs CKD (Cr 2.2, no prior Cr level)  Possibly pre-renal related to ADHF presentation. High protein in urine, consider nephrotic syndrome, intrinsic injury in context of ongoing ibuprofen use. F/U Cr 2.2 --> 2.59 (8/26/24).  - Avoid nephrotoxic medications  - urine electrolytes: urine Na <10, urine Cr 82.3, Urine K/Cr 60     # Alcohol use  Patient mentioned multiple shots a day to help him sleep, later said one one shot with soda. He endorses drinking to drowsiness every night. Elevated alk phos.  - WA protocol     # Disposition  Patient likely not safe to go home for now. Will need social work and APS check on his house. Contact with family will need to be made. During admission once medically stable further pyschiatric and neurological assessment should be performed.       P: Be careful given ADHF  E: PRN  N: NPO pending need for procedure  GI PPX: none  DVT PPX: SCDs, Lovenox ordered but held for bleeding risk  ABX: Vanc + Ceftriaxone  NOK: Skyla Kincaid (Sister) [phone] or [phone]      Ziggy Webb MD  PGY-1, Internal Medicine/Medical Genetics

## 2024-08-27 LAB
ALBUMIN SERPL BCP-MCNC: 2.6 G/DL (ref 3.4–5)
ALBUMIN SERPL BCP-MCNC: 2.7 G/DL (ref 3.4–5)
ALP SERPL-CCNC: 151 U/L (ref 33–136)
ALT SERPL W P-5'-P-CCNC: 26 U/L (ref 10–52)
ANION GAP SERPL CALC-SCNC: 17 MMOL/L (ref 10–20)
ANION GAP SERPL CALC-SCNC: 18 MMOL/L (ref 10–20)
AST SERPL W P-5'-P-CCNC: 23 U/L (ref 9–39)
ATRIAL RATE: 81 BPM
BILIRUB SERPL-MCNC: 0.4 MG/DL (ref 0–1.2)
BUN SERPL-MCNC: 71 MG/DL (ref 6–23)
BUN SERPL-MCNC: 74 MG/DL (ref 6–23)
CALCIUM SERPL-MCNC: 7.6 MG/DL (ref 8.6–10.6)
CALCIUM SERPL-MCNC: 7.7 MG/DL (ref 8.6–10.6)
CHLORIDE SERPL-SCNC: 103 MMOL/L (ref 98–107)
CHLORIDE SERPL-SCNC: 104 MMOL/L (ref 98–107)
CO2 SERPL-SCNC: 18 MMOL/L (ref 21–32)
CO2 SERPL-SCNC: 21 MMOL/L (ref 21–32)
CREAT SERPL-MCNC: 2.42 MG/DL (ref 0.5–1.3)
CREAT SERPL-MCNC: 2.44 MG/DL (ref 0.5–1.3)
EGFRCR SERPLBLD CKD-EPI 2021: 26 ML/MIN/1.73M*2
EGFRCR SERPLBLD CKD-EPI 2021: 27 ML/MIN/1.73M*2
ERYTHROCYTE [DISTWIDTH] IN BLOOD BY AUTOMATED COUNT: 15.6 % (ref 11.5–14.5)
GLUCOSE SERPL-MCNC: 106 MG/DL (ref 74–99)
GLUCOSE SERPL-MCNC: 125 MG/DL (ref 74–99)
HCT VFR BLD AUTO: 22 % (ref 41–52)
HGB BLD-MCNC: 7.2 G/DL (ref 13.5–17.5)
HGB RETIC QN: 23 PG (ref 28–38)
IMMATURE RETIC FRACTION: 29.9 %
MAGNESIUM SERPL-MCNC: 2 MG/DL (ref 1.6–2.4)
MAGNESIUM SERPL-MCNC: 2.07 MG/DL (ref 1.6–2.4)
MCH RBC QN AUTO: 29.6 PG (ref 26–34)
MCHC RBC AUTO-ENTMCNC: 32.7 G/DL (ref 32–36)
MCV RBC AUTO: 91 FL (ref 80–100)
NRBC BLD-RTO: 0.1 /100 WBCS (ref 0–0)
P AXIS: 36 DEGREES
P OFFSET: 195 MS
P ONSET: 130 MS
PHOSPHATE SERPL-MCNC: 5.8 MG/DL (ref 2.5–4.9)
PLATELET # BLD AUTO: 260 X10*3/UL (ref 150–450)
POTASSIUM SERPL-SCNC: 3.6 MMOL/L (ref 3.5–5.3)
POTASSIUM SERPL-SCNC: 4.2 MMOL/L (ref 3.5–5.3)
PR INTERVAL: 168 MS
PROT SERPL-MCNC: 5.6 G/DL (ref 6.4–8.2)
Q ONSET: 214 MS
QRS COUNT: 13 BEATS
QRS DURATION: 168 MS
QT INTERVAL: 466 MS
QTC CALCULATION(BAZETT): 541 MS
QTC FREDERICIA: 514 MS
R AXIS: -20 DEGREES
RBC # BLD AUTO: 2.43 X10*6/UL (ref 4.5–5.9)
RETICS #: 0.1 X10*6/UL (ref 0.02–0.11)
RETICS/RBC NFR AUTO: 4.4 % (ref 0.5–2)
SODIUM SERPL-SCNC: 136 MMOL/L (ref 136–145)
SODIUM SERPL-SCNC: 137 MMOL/L (ref 136–145)
T AXIS: -79 DEGREES
T OFFSET: 447 MS
VENTRICULAR RATE: 81 BPM
WBC # BLD AUTO: 14.8 X10*3/UL (ref 4.4–11.3)

## 2024-08-27 PROCEDURE — 2500000004 HC RX 250 GENERAL PHARMACY W/ HCPCS (ALT 636 FOR OP/ED)

## 2024-08-27 PROCEDURE — 2500000001 HC RX 250 WO HCPCS SELF ADMINISTERED DRUGS (ALT 637 FOR MEDICARE OP)

## 2024-08-27 PROCEDURE — 85045 AUTOMATED RETICULOCYTE COUNT: CPT

## 2024-08-27 PROCEDURE — 36415 COLL VENOUS BLD VENIPUNCTURE: CPT

## 2024-08-27 PROCEDURE — 80069 RENAL FUNCTION PANEL: CPT | Mod: CCI

## 2024-08-27 PROCEDURE — 80053 COMPREHEN METABOLIC PANEL: CPT

## 2024-08-27 PROCEDURE — 85027 COMPLETE CBC AUTOMATED: CPT

## 2024-08-27 PROCEDURE — 1200000002 HC GENERAL ROOM WITH TELEMETRY DAILY

## 2024-08-27 PROCEDURE — 83735 ASSAY OF MAGNESIUM: CPT

## 2024-08-27 PROCEDURE — 93010 ELECTROCARDIOGRAM REPORT: CPT | Performed by: INTERNAL MEDICINE

## 2024-08-27 PROCEDURE — 2500000002 HC RX 250 W HCPCS SELF ADMINISTERED DRUGS (ALT 637 FOR MEDICARE OP, ALT 636 FOR OP/ED)

## 2024-08-27 PROCEDURE — 99233 SBSQ HOSP IP/OBS HIGH 50: CPT

## 2024-08-27 RX ORDER — FUROSEMIDE 10 MG/ML
20 INJECTION INTRAMUSCULAR; INTRAVENOUS ONCE
Status: COMPLETED | OUTPATIENT
Start: 2024-08-27 | End: 2024-08-27

## 2024-08-27 RX ORDER — TRAZODONE HYDROCHLORIDE 50 MG/1
25 TABLET ORAL NIGHTLY PRN
Status: DISCONTINUED | OUTPATIENT
Start: 2024-08-27 | End: 2024-08-27

## 2024-08-27 RX ORDER — LANOLIN ALCOHOL/MO/W.PET/CERES
100 CREAM (GRAM) TOPICAL DAILY
Status: DISCONTINUED | OUTPATIENT
Start: 2024-08-29 | End: 2024-09-09 | Stop reason: HOSPADM

## 2024-08-27 RX ORDER — TALC
6 POWDER (GRAM) TOPICAL DAILY
Status: DISCONTINUED | OUTPATIENT
Start: 2024-08-27 | End: 2024-08-28

## 2024-08-27 RX ADMIN — SULFAMETHOXAZOLE AND TRIMETHOPRIM 1 TABLET: 800; 160 TABLET ORAL at 21:28

## 2024-08-27 RX ADMIN — FUROSEMIDE 20 MG: 10 INJECTION, SOLUTION INTRAMUSCULAR; INTRAVENOUS at 10:39

## 2024-08-27 RX ADMIN — Medication 6 MG: at 18:01

## 2024-08-27 RX ADMIN — ENOXAPARIN SODIUM 40 MG: 100 INJECTION SUBCUTANEOUS at 10:58

## 2024-08-27 RX ADMIN — THIAMINE HYDROCHLORIDE 100 MG: 100 INJECTION, SOLUTION INTRAMUSCULAR; INTRAVENOUS at 08:16

## 2024-08-27 RX ADMIN — Medication 1 TABLET: at 08:16

## 2024-08-27 RX ADMIN — SULFAMETHOXAZOLE AND TRIMETHOPRIM 1 TABLET: 800; 160 TABLET ORAL at 08:16

## 2024-08-27 RX ADMIN — FUROSEMIDE 20 MG: 10 INJECTION, SOLUTION INTRAMUSCULAR; INTRAVENOUS at 14:28

## 2024-08-27 RX ADMIN — FOLIC ACID 1 MG: 1 TABLET ORAL at 08:16

## 2024-08-27 ASSESSMENT — LIFESTYLE VARIABLES
ANXIETY: NO ANXIETY, AT EASE
TOTAL SCORE: 0
VISUAL DISTURBANCES: NOT PRESENT
PAROXYSMAL SWEATS: NO SWEAT VISIBLE
AUDITORY DISTURBANCES: NOT PRESENT
ANXIETY: NO ANXIETY, AT EASE
TREMOR: NO TREMOR
AUDITORY DISTURBANCES: NOT PRESENT
PAROXYSMAL SWEATS: NO SWEAT VISIBLE
AGITATION: NORMAL ACTIVITY
TREMOR: NO TREMOR
AUDITORY DISTURBANCES: NOT PRESENT
NAUSEA AND VOMITING: NO NAUSEA AND NO VOMITING
ANXIETY: NO ANXIETY, AT EASE
ANXIETY: NO ANXIETY, AT EASE
TOTAL SCORE: 0
NAUSEA AND VOMITING: NO NAUSEA AND NO VOMITING
TOTAL SCORE: 0
TOTAL SCORE: 0
AGITATION: NORMAL ACTIVITY
ORIENTATION AND CLOUDING OF SENSORIUM: ORIENTED AND CAN DO SERIAL ADDITIONS
ORIENTATION AND CLOUDING OF SENSORIUM: ORIENTED AND CAN DO SERIAL ADDITIONS
VISUAL DISTURBANCES: NOT PRESENT
NAUSEA AND VOMITING: NO NAUSEA AND NO VOMITING
VISUAL DISTURBANCES: NOT PRESENT
AUDITORY DISTURBANCES: NOT PRESENT
HEADACHE, FULLNESS IN HEAD: NOT PRESENT
ANXIETY: NO ANXIETY, AT EASE
NAUSEA AND VOMITING: NO NAUSEA AND NO VOMITING
HEADACHE, FULLNESS IN HEAD: NOT PRESENT
HEADACHE, FULLNESS IN HEAD: NOT PRESENT
TREMOR: NO TREMOR
AUDITORY DISTURBANCES: NOT PRESENT
PAROXYSMAL SWEATS: NO SWEAT VISIBLE
PAROXYSMAL SWEATS: NO SWEAT VISIBLE
AGITATION: NORMAL ACTIVITY
VISUAL DISTURBANCES: NOT PRESENT
ORIENTATION AND CLOUDING OF SENSORIUM: ORIENTED AND CAN DO SERIAL ADDITIONS
AGITATION: NORMAL ACTIVITY
NAUSEA AND VOMITING: NO NAUSEA AND NO VOMITING
AGITATION: NORMAL ACTIVITY
HEADACHE, FULLNESS IN HEAD: NOT PRESENT
PAROXYSMAL SWEATS: NO SWEAT VISIBLE
ORIENTATION AND CLOUDING OF SENSORIUM: ORIENTED AND CAN DO SERIAL ADDITIONS
VISUAL DISTURBANCES: NOT PRESENT
HEADACHE, FULLNESS IN HEAD: NOT PRESENT
TREMOR: NO TREMOR
PULSE: 85
TREMOR: NO TREMOR
ORIENTATION AND CLOUDING OF SENSORIUM: ORIENTED AND CAN DO SERIAL ADDITIONS
TOTAL SCORE: 0

## 2024-08-27 ASSESSMENT — COGNITIVE AND FUNCTIONAL STATUS - GENERAL
PERSONAL GROOMING: A LOT
EATING MEALS: A LITTLE
CLIMB 3 TO 5 STEPS WITH RAILING: TOTAL
DRESSING REGULAR LOWER BODY CLOTHING: A LOT
DAILY ACTIVITIY SCORE: 14
HELP NEEDED FOR BATHING: A LOT
PERSONAL GROOMING: A LOT
STANDING UP FROM CHAIR USING ARMS: TOTAL
DRESSING REGULAR UPPER BODY CLOTHING: A LITTLE
WALKING IN HOSPITAL ROOM: TOTAL
HELP NEEDED FOR BATHING: A LOT
CLIMB 3 TO 5 STEPS WITH RAILING: TOTAL
MOBILITY SCORE: 8
DAILY ACTIVITIY SCORE: 14
TOILETING: A LOT
MOBILITY SCORE: 8
MOVING TO AND FROM BED TO CHAIR: TOTAL
TURNING FROM BACK TO SIDE WHILE IN FLAT BAD: A LOT
MOVING FROM LYING ON BACK TO SITTING ON SIDE OF FLAT BED WITH BEDRAILS: A LOT
DRESSING REGULAR UPPER BODY CLOTHING: A LITTLE
MOVING TO AND FROM BED TO CHAIR: TOTAL
EATING MEALS: A LITTLE
DRESSING REGULAR LOWER BODY CLOTHING: A LOT
TURNING FROM BACK TO SIDE WHILE IN FLAT BAD: A LOT
MOVING FROM LYING ON BACK TO SITTING ON SIDE OF FLAT BED WITH BEDRAILS: A LOT
TOILETING: A LOT
STANDING UP FROM CHAIR USING ARMS: TOTAL
WALKING IN HOSPITAL ROOM: TOTAL

## 2024-08-27 ASSESSMENT — PAIN - FUNCTIONAL ASSESSMENT
PAIN_FUNCTIONAL_ASSESSMENT: 0-10

## 2024-08-27 ASSESSMENT — PAIN SCALES - GENERAL
PAINLEVEL_OUTOF10: 0 - NO PAIN

## 2024-08-27 NOTE — HOSPITAL COURSE
Yohan Dykes is a 79 y.o. male without past medical history presenting with fall while using office chair with wheels without loss of consciousness. He reported having SOB and difficulties sleeping due to discomfort from distended abdomen. Also had history of orthopnea and PND with some productive cough. He had an oximeter at home, which reported SpO2 at 80s when lying flat. He denied history of chest pain or palpitation. He had been taking Advil (ibuprofen-acetaminophen) q4-6h and mucinex daily prior to hospital visit.     At the ED, he had mild hypertension 144/96, with mild respiratory distress and RR of 20 with poor oxygenation. He was put on 2L NC. Labs are notable for leukocytosis to 16.5, hemoglobin 7.3-8.9 (with no known baseline), Cr 2.20 (no known baseline), BUN 68, Alk Phos 180, CRP 5.5, ESR 34, BNP >2000, A1c 6.1, UA notable for proteinuria to 30. His CXR demonstrating bilateral pleural effusions R>L. No cultures were collected. CT head negative and he received 1 stitch for his head laceration. His left foot XR suspicious for soft tissue gases. CT foot showed diffuse soft tissue swelling, likely infection/cellulitis without osteomyelitis, and he was initially put on Zosyn and Vancomycin.     He was admitted on 8/25/2024 for the treatment of acute decompensated heart failure, with KIA, normocytic anemia, and cellulitis of left leg. His antibiotics were deescalated to IV ceftriaxone/Vancomycin and oral bactrim DS bid for 5 days. Echocardiogram revealed severe LV dysfunction with LVEF 15-20% with global hypokinesia and moderately elevated PAP, likely suggestive of HFrEF with secondary PHT. Liver US doppler revealed moderate ascites with hepatomegaly. Vascular US was negative. He was treated with IV diuretics for 7 days and his urine net urine output was negative for 60782 mL over 7 days. He was then started on a regimen of IV lasix 10 mg/hr ggt and metolazone 5 mg. Ischemic eval was deferred due to his  HFrEF not adequately diuresed and anemia.    His hospital course was complicated by UGIB with decrease of Hb to 6.6-6.9 g/dL. He was transfused with a total 5 units of packed RBCs, withheld for lovenox prophylaxis, and given IV pantoprazole 40 mg bid with significant reduction in his melena. GI was consulted but EGD was deferred due to concerns of HFrEF but recommended transfusing for Hgb>8. He was then transferred to the cardiology [facility] service for further management of heart failure. On the [facility] service, his lasix gtt was increased to 30mg/hr with IV lasix boluses given. RHC was also ordered to assess volume status. Diuresis was complicated and then ultimately the lasix drip was stopped due to repeated hypokalemia which required repeat IV supplementation and scheduled PO supplementation as well. Diuresis was changed to IV lasix and acetazolamide due to notable contraction alkalosis. Paracentesis was attempted due to concern for cirrhosis but no adequate fluid pocket was found. ***      [  ] Concern for cirrhosis, maybe work it up? GI said would need to start with a paracentesis and obtain a SAAG  [  ] follow up RHC numebers to determine volume status  [  ] discuss need for continued diuresis with IV lasix and acetazolamide

## 2024-08-27 NOTE — CARE PLAN
The patient's goals for the shift include will be free from falls /injury through out the shift    The clinical goals for the shift include Pt will be compliant with 1500m/day fluid restriction    Problem: Skin  Goal: Decreased wound size/increased tissue granulation at next dressing change  Outcome: Progressing  Flowsheets (Taken 8/27/2024 1709)  Decreased wound size/increased tissue granulation at next dressing change:   Promote sleep for wound healing   Protective dressings over bony prominences  Goal: Participates in plan/prevention/treatment measures  Outcome: Progressing  Flowsheets (Taken 8/27/2024 1709)  Participates in plan/prevention/treatment measures:   Discuss with provider PT/OT consult   Increase activity/out of bed for meals   Elevate heels  Goal: Prevent/manage excess moisture  Outcome: Progressing  Flowsheets (Taken 8/27/2024 1709)  Prevent/manage excess moisture:   Cleanse incontinence/protect with barrier cream   Moisturize dry skin  Goal: Prevent/minimize sheer/friction injuries  Outcome: Progressing  Flowsheets (Taken 8/27/2024 1709)  Prevent/minimize sheer/friction injuries:   Turn/reposition every 2 hours/use positioning/transfer devices   Complete micro-shifts as needed if patient unable. Adjust patient position to relieve pressure points, not a full turn   Utilize specialty bed per algorithm   HOB 30 degrees or less   Use pull sheet  Goal: Promote/optimize nutrition  Outcome: Progressing  Flowsheets (Taken 8/27/2024 1709)  Promote/optimize nutrition: Monitor/record intake including meals  Goal: Promote skin healing  Outcome: Progressing  Flowsheets (Taken 8/27/2024 1709)  Promote skin healing:   Rotate device position/do not position patient on device   Protective dressings over bony prominences     Over the shift, the patient did not make progress toward the following goals. Barriers to progression include weakness, acuity of illness. Recommendations to address these barriers include  provide optimal environment for transfer, encourage with compliance with fluid restrictions..

## 2024-08-27 NOTE — ED PROCEDURE NOTE
Procedure  Laceration Repair    Performed by: Inna Clark MD  Authorized by: Inna Clark MD    Consent:     Consent obtained:  Verbal    Consent given by:  Patient    Risks, benefits, and alternatives were discussed: yes      Risks discussed:  Infection, pain, poor cosmetic result, poor wound healing and need for additional repair    Alternatives discussed:  No treatment, delayed treatment and observation  Universal protocol:     Procedure explained and questions answered to patient or proxy's satisfaction: yes      Test results available: yes      Imaging studies available: yes      Required blood products, implants, devices, and special equipment available: yes      Patient identity confirmed:  Verbally with patient, hospital-assigned identification number and arm band  Anesthesia:     Anesthesia method:  Local infiltration    Local anesthetic:  Lidocaine 1% WITH epi  Laceration details:     Location:  Scalp    Length (cm):  0.5    Depth (mm):  0.2  Exploration:     Limited defect created (wound extended): no      Hemostasis achieved with:  Epinephrine  Treatment:     Area cleansed with:  Saline  Skin repair:     Repair method:  Sutures    Suture size:  4-0    Suture material:  Prolene    Suture technique:  Simple interrupted    Number of sutures:  1  Approximation:     Approximation:  Close  Repair type:     Repair type:  Simple  Post-procedure details:     Procedure completion:  Tolerated well, no immediate complications               Inna Clark MD  08/26/24 5222

## 2024-08-27 NOTE — PROGRESS NOTES
08/27/24 1215   Discharge Planning   Living Arrangements Alone   Type of Residence Private residence   Home or Post Acute Services In home services   Expected Discharge Disposition HH Services     SW met with pt at bedside to assess. Pt said he lives in an apartment alone, and lives on the first floor. He said there are quite a few stairs but also an elevator. He said he thinks he wants HHC, for wound dressing and possibly other help to do basic household needs. Pt said he uses a cane and has a PCP, Francisco Javier Chery.    Linda Nogueira MSW [facility]  Care Transitions  2  [phone] or Epic Secure Chat

## 2024-08-27 NOTE — CONSULTS
"Nutrition Initial Assessment:   Nutrition Assessment    Reason for Assessment: Admission nursing screening    Patient is a 79 y.o. male presenting with acute decompensated heart failure, PMHx unknown.    Nutrition History:  Food and Nutrient History: RDN met with pt who reports a signifcant wt loss since January 2024. Reports he has had swelling which has made eating difficult. States he has not been able to eat salads or other high fiber foods as this causes distension. Also reports he has mainly been consuming fruit juices and soft things like peanut butter in an attempt to not have to have a BM. Does drink Ensure at home.  Vitamin/Herbal Supplement Use: used to take a MVI  Food Allergies/Intolerances:   reports he is lactose intolerant   GI Symptoms:  bloating, distention  Oral Problems: None       Anthropometrics:  Height: 180.3 cm (5' 11\")   Weight: 104 kg (230 lb)   BMI (Calculated): 32.09  IBW/kg (Dietitian Calculated): 78.2 kg    Weight Change %: reports a wt loss of 14-18 kg since January 2024       Nutrition Focused Physical Exam Findings:    Subcutaneous Fat Loss:   Orbital Fat Pads: Mild-Moderate (slight dark circles and slight hollowing)  Buccal Fat Pads: Mild-Moderate (flat cheeks, minimal bounce)  Triceps: Mild-Moderate (less than ample fat tissue)  Muscle Wasting:  Temporalis: Mild-Moderate (slight depression)  Pectoralis (Clavicular Region): Mild-Moderate (some protrusion of clavicle)  Deltoid/Trapezius: Mild-Moderate (slight protrusion of acromion process)  Edema:  Edema Location: bilat LE edema  Physical Findings:  Eyes: Negative  Mouth: Negative  Nails: Negative  Skin: Positive (??jaundiced, could also be suntan)    Nutrition Significant Labs:  BG POCT trend:   Results from last 7 days   Lab Units 08/26/24  0116   POCT GLUCOSE mg/dL 123*    , Renal Lab Trend:   Results from last 7 days   Lab Units 08/27/24  0708 08/26/24  1514 08/26/24  0730 08/25/24  0516   POTASSIUM mmol/L 4.2 4.2 4.1 4.4 "   PHOSPHORUS mg/dL  --  5.7*  --   --    SODIUM mmol/L 136 137 139 139   MAGNESIUM mg/dL 2.07  --  1.98 2.04   EGFR mL/min/1.73m*2 26* 26* 25* 30*   BUN mg/dL 74* 76* 73* 68*   CREATININE mg/dL 2.44* 2.48* 2.59* 2.20*        Nutrition Specific Medications:  Scheduled medications  folic acid, 1 mg, oral, Daily  furosemide, 20 mg, intravenous, Once  multivitamin with minerals, 1 tablet, oral, Daily  [START ON 8/29/2024] thiamine, 100 mg, oral, Daily  thiamine, 100 mg, intravenous, Daily    I/O:   Last BM Date: 08/23/24;      Dietary Orders (From admission, onward)       Start     Ordered    08/26/24 1249  Adult diet 2-3 grams sodium, Cardiac; 1500 mL fluid; 70 gm fat; 2 - 3 grams Sodium  Diet effective now        Question Answer Comment   Diet type 2-3 grams sodium    Diet type Cardiac    Dietary fluid restriction / 24h: 1500 mL fluid    Fat restriction: 70 gm fat    Sodium restriction: 2 - 3 grams Sodium        08/26/24 1248                     Estimated Needs:   Total Energy Estimated Needs (kCal):  (9925-9913)  Method for Estimating Needs: 25-30 kcal/kg IBW  Total Protein Estimated Needs (g):  ()  Method for Estimating Needs: 1.2-1.4 g/kg IBW  Total Fluid Estimated Needs (mL):  (per team)      Nutrition Diagnosis   Malnutrition Diagnosis  Patient has Malnutrition Diagnosis: Yes  Diagnosis Status: New  Malnutrition Diagnosis: Moderate malnutrition related to chronic disease or condition  As Evidenced by: mild to moderate muscle loss and subcutaneous fat loss. Also question if pt is consuming >75% of EEN.            Nutrition Interventions/Recommendations         Nutrition Prescription:  Individualized Nutrition Prescription Provided for : diet + oral supplements        Nutrition Interventions:   Interventions: Meals and snacks, Vitamin supplement therapy, Medical food supplement  Meals and Snacks: Fluid-modified diet, Mineral-modified diet  Goal: Continue with low sodium diet, will remove cardiac low fat  restriction to increase choice. Fluid restrition per team.  Medical Food Supplement: Commercial beverage  Goal: Ensure Plus BID (pt states he is okay with this being his fluid allotment for the meal)  Vitamin Supplement Therapy: Thiamin  Goal: 100 mg thiamine + daily MVI          Nutrition Monitoring and Evaluation   Food/Nutrient Related History Monitoring  Monitoring and Evaluation Plan: Amount of food  Amount of Food: Estimated amout of food, Medical food intake  Criteria: consume >75%      Time Spent (min): 45 minutes

## 2024-08-27 NOTE — PROGRESS NOTES
"Dinesh Mccall is a 79 y.o. male on day 2 of admission presenting with Acute decompensated heart failure (Multi).    Subjective   Today, he reported to still have ongoing SOB when moving around but is getting better compared to yesterday. No fever, chest pain, or palpitation. Still having some degree of pain on left lower extremity. No acute/active event overnight.    Objective     Physical Exam  Physical Exam  Constitutional:       General: He is not in acute distress.     Appearance: He is obese. He is ill-appearing.   HENT:      Head: Normocephalic.      Comments: Patient has laceration and dried blood at the top of his forehead to the right near hairline.      Nose: No rhinorrhea.   Eyes:      Extraocular Movements: Extraocular movements intact.      Conjunctiva/sclera: Conjunctivae normal.      Pupils: Pupils are equal, round, and reactive to light.   Cardiovascular:      Rate and Rhythm: Normal rate and regular rhythm.      Comments: Heart sounds are distant. Dorsalis pedis artery obtained via doppler.  Pulmonary:      Effort: Pulmonary effort is normal. No respiratory distress.      Breath sounds: No stridor. No rales . No wheezing or rhonchi.      Decreased breath sound bilaterally at basal lung field.  Abdominal:      General: There is no distension.     Tenderness: There is no abdominal tenderness, no guarding.   Musculoskeletal:         General: Signs of injury present.      Cervical back: Normal range of motion.      Right lower leg: Edema present 1+.      Left lower leg: Edema present 1+.    Neurological:      General: No focal deficit present.      Mental Status: He is alert.      Comments: Oriented and without FND spontaneous to conversation.    Psychiatric:         Mood and Affect: Mood normal.         Behavior: Behavior normal.     Last Recorded Vitals  Blood pressure 125/83, pulse 87, temperature 36.4 °C (97.5 °F), resp. rate 16, height 1.803 m (5' 11\"), weight 104 kg (230 lb), SpO2 " 95%.  Intake/Output last 3 Shifts:  I/O last 3 completed shifts:  In: 60 (0.6 mL/kg) [P.O.:50; I.V.:10 (0.1 mL/kg)]  Out: 1800 (17.3 mL/kg) [Urine:1800 (0.5 mL/kg/hr)]  Weight: 104.3 kg     Relevant Results  Results for orders placed or performed during the hospital encounter of 08/25/24 (from the past 24 hour(s))   Transthoracic Echo (TTE) Complete   Result Value Ref Range    AV pk kelle 1.81 m/s    AV mn grad 7.0 mmHg    LVOT diam 2.30 cm    MV E/A ratio 1.45     LA vol index A/L 58.4 ml/m2    Tricuspid annular plane systolic excursion 3.1 cm    LV EF 18 %    RV free wall pk S' 12.70 cm/s    LVIDd 7.30 cm    RVSP 45.9 mmHg    Aortic Valve Area by Continuity of VTI 2.25 cm2    Aortic Valve Area by Continuity of Peak Velocity 2.39 cm2    AV pk grad 13.1 mmHg    LV A4C EF 25.8    Vascular US lower extremity venous duplex bilateral   Result Value Ref Range    BSA 2.29 m2   Renal function panel   Result Value Ref Range    Glucose 134 (H) 74 - 99 mg/dL    Sodium 137 136 - 145 mmol/L    Potassium 4.2 3.5 - 5.3 mmol/L    Chloride 105 98 - 107 mmol/L    Bicarbonate 18 (L) 21 - 32 mmol/L    Anion Gap 18 10 - 20 mmol/L    Urea Nitrogen 76 (H) 6 - 23 mg/dL    Creatinine 2.48 (H) 0.50 - 1.30 mg/dL    eGFR 26 (L) >60 mL/min/1.73m*2    Calcium 7.9 (L) 8.6 - 10.6 mg/dL    Phosphorus 5.7 (H) 2.5 - 4.9 mg/dL    Albumin 2.7 (L) 3.4 - 5.0 g/dL   Magnesium   Result Value Ref Range    Magnesium 2.07 1.60 - 2.40 mg/dL   CBC   Result Value Ref Range    WBC 14.8 (H) 4.4 - 11.3 x10*3/uL    nRBC 0.1 (H) 0.0 - 0.0 /100 WBCs    RBC 2.43 (L) 4.50 - 5.90 x10*6/uL    Hemoglobin 7.2 (L) 13.5 - 17.5 g/dL    Hematocrit 22.0 (L) 41.0 - 52.0 %    MCV 91 80 - 100 fL    MCH 29.6 26.0 - 34.0 pg    MCHC 32.7 32.0 - 36.0 g/dL    RDW 15.6 (H) 11.5 - 14.5 %    Platelets 260 150 - 450 x10*3/uL   Comprehensive metabolic panel   Result Value Ref Range    Glucose 125 (H) 74 - 99 mg/dL    Sodium 136 136 - 145 mmol/L    Potassium 4.2 3.5 - 5.3 mmol/L    Chloride  104 98 - 107 mmol/L    Bicarbonate 18 (L) 21 - 32 mmol/L    Anion Gap 18 10 - 20 mmol/L    Urea Nitrogen 74 (H) 6 - 23 mg/dL    Creatinine 2.44 (H) 0.50 - 1.30 mg/dL    eGFR 26 (L) >60 mL/min/1.73m*2    Calcium 7.6 (L) 8.6 - 10.6 mg/dL    Albumin 2.6 (L) 3.4 - 5.0 g/dL    Alkaline Phosphatase 151 (H) 33 - 136 U/L    Total Protein 5.6 (L) 6.4 - 8.2 g/dL    AST 23 9 - 39 U/L    Bilirubin, Total 0.4 0.0 - 1.2 mg/dL    ALT 26 10 - 52 U/L     US liver with doppler (8/27/2024)  1. Moderate volume ascites.   2. Hepatomegaly with diffusely increased echogenicity of the liver which may be seen in the setting of steatosis. Correlate clinically with liver enzymes.   3. Unremarkable Doppler interrogation of the liver.   4. Incidental note of a right pleural effusion.    Transthoracic Echo (8/26/2024)   1. The left ventricular systolic function is severely decreased, with a visually estimated ejection fraction of 15-20%.   2. There is global hypokinesis of the left ventricle with minor regional variations.   3. Spectral Doppler shows a pseudonormal pattern of left ventricular diastolic filling.   4. There is an elevated mean left atrial pressure.   5. Left ventricular cavity size is severely dilated.   6. No left ventricular thrombus visualized.   7. There is mild eccentric left ventricular hypertrophy.   8. There is low normal right ventricular systolic function.   9. Moderately enlarged right ventricle.  10. The left atrium is severely dilated.  11. The right atrium is severely dilated.  12. Moderate mitral valve regurgitation.  13. Moderate to severe tricuspid regurgitation visualized.  14. Aortic valve sclerosis.  15. Mild to moderately elevated pulmonary artery pressure.  16. There is no evidence of a patent foramen ovale.     XR hip right with pelvis when performed 2 or 3 views (8/25/2024)  Severe bilateral hip osteoarthrosis, right worse than left     CT Foot Left without IV contrast (8/25/2024)  1. Diffuse soft tissue  swelling and edema with skin thickening and  skin surface ulceration of the dorsal midfoot. No underlying soft  tissue gas or collection to confirm the suspicion on recent plain  film.. These findings are most suspicious for superficial soft tissue  infection/cellulitis.  2. No erosive change or other acute osseous abnormality to suggest  osteomyelitis.     Vascular US lower extremity venous duplex bilateral (8/26/2024)  - Right Lower Venous: No evidence of acute deep vein thrombus visualized in the right lower extremity. Calf veins were imaged segmentally.  - Left Lower Venous: No evidence of acute deep vein thrombus visualized in the left lower extremity. Calf veins were imaged segmentally.    Assessment/Plan   Dinesh Mccall is a 79 year old man with unknown medical history presenting for fall with laceration in apparent acute decompensated heart failure of unclear etiology. He has severe bilateral LE edema with chronic skin changes and L dorsal mid foot cellulitis likely secondary to ulceration per CT read. Patient has signs via EMS, ED, primary team concerning for failure to thrive and social isolation. He is admitted for stabilization and work up of his cardiopulmonary status and cellulitis infection. He will need help from social work and likely rehab given long period of deconditioning.     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 No history of chest pain or heart attack. Some concern for reliability given unclear social status. His history and presentation is consistent with acute decompensated heart failure of unknown etiology. BNP >2000.   Differential diagnosis of etiologies of HFrEF    - Ischemic CM, given his age group, family history of MI and heart failure, and his EKG of RBBB  and 50 pack year smoking history     - Alcohol-induced CM? (History of drinking)    - Post-viral myocarditis?    - Amyloidosis?   Admission weight 230lb. A1C 6.6%; LDL-C 36. HIV and TSH WNL.   - Continuous Telemetry  - Strict I&O with daily weights  - Diuresis: bid 20 mg lasix IV --> total urine output 1580 mL/d    lungs - less congestion, symptoms partially improved  - TTE 8/26/2024: LVEF 15-20%, global hypokinesia of LV, elevated mean LAP, moderate MR, moderate/severe TR, moderately enlarged RV, mild/moderately elevated PAP  Plan   - continue O2 cannula 2 LPM, keep SpO2 >94%  - diuresis with IV lasix 20 mg -> F/U urine output next 6 hr    Will consider redose or up-titrate dose as per urine output  - Will consider cardiology consult (HF, first presentation) after euvolemic status achieved   - plan to consider starting GDMT if stable serum Cr + euvolemia achieved     # Left lower extremity infection, likely cellulitis per CT  Patient has LE edema bilaterally with more redness on the left. No pus noted. XR L Foot was suspicious for soft tissue gases. There is an ulceration on the anterior ankle but no open wound or evidence of pus. Culture were not taken prior to starting antibiotics. Follow up CT negative for deep space infection or osteo.   Antibiotics    - Vanc + Zosyn 8/25/2024 --> Vanc + ceftriaxone 8/25-8/26  Switched to oral antibiotics of bactrim 800 mg bid (since 8/26/24 - now). U/S Doppler DVT: no evidence of acute DVT both legs   Plan  - on Bactrim (800) 2 tabs BID plan 5 days in total  - Daily CBC, trend inflammatory markers: CRP 5.5 --> 6.42    # Mechanical Post fall   # Low hemoglobin last 7.2-7.3 (suspected ACD, cause suspected from heart failure + infection, may have EMERY)  CT Head negative. Patient complaining of R hip swelling this afternoon. Denies pain, but hyperventilated on palpation. Hemoglobin 8.9 AM-> 7.2 PM -> 7.3 on 8/26. No active bleeding  - T&S in process   - s/p 1 stitch for forehead  laceration notable blood loss  - F/u Right Hip XR - severe osteoporosis of both hip (R>L)  - Serum iron 28, %saturation 8%, TIBC 353, ferritin 40 --> could be ACD (Differential diagnosis EMERY, anemia of CKD)  Plan  - F/U CBC daily, keep Hb >7 g/dL  - W/U reticulocyte count tomorrow     # KIA vs CKD (Cr 2.2, no prior Cr level)  Possibly pre-renal related to ADHF presentation. High protein in urine, consider nephrotic syndrome, intrinsic injury in context of ongoing ibuprofen use. Given poor cardiac function, cardiorenal syndrome could also contribute to his serum Cr rising. F/U Cr 2.2 -> 2.59 -> 2.48 -> 2.44 (stable).  - urine elytes: urine Na <10, urine Cr 82.3, Urine K/Cr 60  Plan  - Continue diuresis until euvolemia achieved  - Avoid nephrotoxic medications  - Monitoring of RFP daily to twice daily     # Alcohol use  Patient mentioned multiple shots a day to help him sleep, later said one one shot with soda. He endorses drinking to drowsiness every night. Elevated alk phos.  - CIWA protocol discontinued on 8/27/2024    # Insomnia  - on Melatonin 6 mg po at night     # Disposition  Patient likely not safe to go home for now. Will need social work and APS check on his house. Contact with family will need to be made. During admission once medically stable further pyschiatric and neurological assessment should be performed.       P: Be careful given ADHF  E: PRN  N: NPO pending need for procedure  GI PPX: none  DVT PPX: SCDs, Lovenox ordered but held for bleeding risk  ABX: Vanc + Ceftriaxone  NOK: Skyla Kincaid (Sister) [phone] or [phone]      Ziggy Webb MD  PGY-1, Internal Medicine/Medical Genetics

## 2024-08-27 NOTE — CARE PLAN
Problem: Pain - Adult  Goal: Verbalizes/displays adequate comfort level or baseline comfort level  Outcome: Progressing     Problem: Safety - Adult  Goal: Free from fall injury  Outcome: Progressing     Problem: Chronic Conditions and Co-morbidities  Goal: Patient's chronic conditions and co-morbidity symptoms are monitored and maintained or improved  Outcome: Progressing   The patient's goals for the shift include      The clinical goals for the shift include Will maintain safety during shift

## 2024-08-28 LAB
ABO GROUP (TYPE) IN BLOOD: NORMAL
ALBUMIN SERPL BCP-MCNC: 2.8 G/DL (ref 3.4–5)
ALP SERPL-CCNC: 134 U/L (ref 33–136)
ALT SERPL W P-5'-P-CCNC: 23 U/L (ref 10–52)
ANION GAP SERPL CALC-SCNC: 19 MMOL/L (ref 10–20)
ANTIBODY SCREEN: NORMAL
AST SERPL W P-5'-P-CCNC: 19 U/L (ref 9–39)
BILIRUB SERPL-MCNC: 0.5 MG/DL (ref 0–1.2)
BLOOD EXPIRATION DATE: NORMAL
BUN SERPL-MCNC: 70 MG/DL (ref 6–23)
CALCIUM SERPL-MCNC: 7.5 MG/DL (ref 8.6–10.6)
CHLORIDE SERPL-SCNC: 102 MMOL/L (ref 98–107)
CO2 SERPL-SCNC: 19 MMOL/L (ref 21–32)
CREAT SERPL-MCNC: 2.55 MG/DL (ref 0.5–1.3)
DISPENSE STATUS: NORMAL
EGFRCR SERPLBLD CKD-EPI 2021: 25 ML/MIN/1.73M*2
ERYTHROCYTE [DISTWIDTH] IN BLOOD BY AUTOMATED COUNT: 15.9 % (ref 11.5–14.5)
ERYTHROCYTE [DISTWIDTH] IN BLOOD BY AUTOMATED COUNT: 15.9 % (ref 11.5–14.5)
GLUCOSE SERPL-MCNC: 127 MG/DL (ref 74–99)
HAPTOGLOB SERPL NEPH-MCNC: 167 MG/DL (ref 30–200)
HCT VFR BLD AUTO: 20.4 % (ref 41–52)
HCT VFR BLD AUTO: 24.5 % (ref 41–52)
HGB BLD-MCNC: 6.6 G/DL (ref 13.5–17.5)
HGB BLD-MCNC: 7.5 G/DL (ref 13.5–17.5)
LDH SERPL L TO P-CCNC: 200 U/L (ref 84–246)
MAGNESIUM SERPL-MCNC: 2.09 MG/DL (ref 1.6–2.4)
MCH RBC QN AUTO: 29.1 PG (ref 26–34)
MCH RBC QN AUTO: 29.4 PG (ref 26–34)
MCHC RBC AUTO-ENTMCNC: 30.6 G/DL (ref 32–36)
MCHC RBC AUTO-ENTMCNC: 32.4 G/DL (ref 32–36)
MCV RBC AUTO: 90 FL (ref 80–100)
MCV RBC AUTO: 96 FL (ref 80–100)
NRBC BLD-RTO: 0 /100 WBCS (ref 0–0)
NRBC BLD-RTO: 0.2 /100 WBCS (ref 0–0)
PLATELET # BLD AUTO: 262 X10*3/UL (ref 150–450)
PLATELET # BLD AUTO: 265 X10*3/UL (ref 150–450)
POTASSIUM SERPL-SCNC: 3.3 MMOL/L (ref 3.5–5.3)
PRODUCT BLOOD TYPE: 9500
PRODUCT CODE: NORMAL
PROT SERPL-MCNC: 5.8 G/DL (ref 6.4–8.2)
RBC # BLD AUTO: 2.27 X10*6/UL (ref 4.5–5.9)
RBC # BLD AUTO: 2.55 X10*6/UL (ref 4.5–5.9)
RH FACTOR (ANTIGEN D): NORMAL
SODIUM SERPL-SCNC: 137 MMOL/L (ref 136–145)
UNIT ABO: NORMAL
UNIT NUMBER: NORMAL
UNIT RH: NORMAL
UNIT VOLUME: 350
WBC # BLD AUTO: 12.2 X10*3/UL (ref 4.4–11.3)
WBC # BLD AUTO: 12.6 X10*3/UL (ref 4.4–11.3)
XM INTEP: NORMAL

## 2024-08-28 PROCEDURE — 99222 1ST HOSP IP/OBS MODERATE 55: CPT | Performed by: STUDENT IN AN ORGANIZED HEALTH CARE EDUCATION/TRAINING PROGRAM

## 2024-08-28 PROCEDURE — 86923 COMPATIBILITY TEST ELECTRIC: CPT

## 2024-08-28 PROCEDURE — 99222 1ST HOSP IP/OBS MODERATE 55: CPT | Performed by: INTERNAL MEDICINE

## 2024-08-28 PROCEDURE — 36415 COLL VENOUS BLD VENIPUNCTURE: CPT

## 2024-08-28 PROCEDURE — 83615 LACTATE (LD) (LDH) ENZYME: CPT

## 2024-08-28 PROCEDURE — 2500000004 HC RX 250 GENERAL PHARMACY W/ HCPCS (ALT 636 FOR OP/ED)

## 2024-08-28 PROCEDURE — 85027 COMPLETE CBC AUTOMATED: CPT

## 2024-08-28 PROCEDURE — 2500000002 HC RX 250 W HCPCS SELF ADMINISTERED DRUGS (ALT 637 FOR MEDICARE OP, ALT 636 FOR OP/ED)

## 2024-08-28 PROCEDURE — 83010 ASSAY OF HAPTOGLOBIN QUANT: CPT

## 2024-08-28 PROCEDURE — 2500000001 HC RX 250 WO HCPCS SELF ADMINISTERED DRUGS (ALT 637 FOR MEDICARE OP)

## 2024-08-28 PROCEDURE — 1200000002 HC GENERAL ROOM WITH TELEMETRY DAILY

## 2024-08-28 PROCEDURE — 36430 TRANSFUSION BLD/BLD COMPNT: CPT

## 2024-08-28 PROCEDURE — 99233 SBSQ HOSP IP/OBS HIGH 50: CPT

## 2024-08-28 PROCEDURE — 84466 ASSAY OF TRANSFERRIN: CPT

## 2024-08-28 PROCEDURE — 83735 ASSAY OF MAGNESIUM: CPT

## 2024-08-28 PROCEDURE — 80053 COMPREHEN METABOLIC PANEL: CPT

## 2024-08-28 PROCEDURE — 86901 BLOOD TYPING SEROLOGIC RH(D): CPT

## 2024-08-28 PROCEDURE — P9040 RBC LEUKOREDUCED IRRADIATED: HCPCS

## 2024-08-28 RX ORDER — PANTOPRAZOLE SODIUM 40 MG/10ML
80 INJECTION, POWDER, LYOPHILIZED, FOR SOLUTION INTRAVENOUS ONCE
Status: COMPLETED | OUTPATIENT
Start: 2024-08-28 | End: 2024-08-28

## 2024-08-28 RX ORDER — PANTOPRAZOLE SODIUM 40 MG/10ML
40 INJECTION, POWDER, LYOPHILIZED, FOR SOLUTION INTRAVENOUS EVERY 12 HOURS SCHEDULED
Status: DISCONTINUED | OUTPATIENT
Start: 2024-08-28 | End: 2024-08-28

## 2024-08-28 RX ORDER — LOPERAMIDE HYDROCHLORIDE 2 MG/1
2 CAPSULE ORAL ONCE
Status: COMPLETED | OUTPATIENT
Start: 2024-08-28 | End: 2024-08-28

## 2024-08-28 RX ORDER — PANTOPRAZOLE SODIUM 40 MG/10ML
40 INJECTION, POWDER, LYOPHILIZED, FOR SOLUTION INTRAVENOUS EVERY 12 HOURS SCHEDULED
Status: DISCONTINUED | OUTPATIENT
Start: 2024-08-28 | End: 2024-09-09 | Stop reason: HOSPADM

## 2024-08-28 RX ORDER — LOPERAMIDE HYDROCHLORIDE 2 MG/1
2 CAPSULE ORAL 4 TIMES DAILY PRN
Status: DISCONTINUED | OUTPATIENT
Start: 2024-08-28 | End: 2024-08-29

## 2024-08-28 RX ORDER — FUROSEMIDE 10 MG/ML
20 INJECTION INTRAMUSCULAR; INTRAVENOUS ONCE
Status: CANCELLED | OUTPATIENT
Start: 2024-08-28 | End: 2024-08-28

## 2024-08-28 RX ORDER — POTASSIUM CHLORIDE 20 MEQ/1
20 TABLET, EXTENDED RELEASE ORAL ONCE
Status: COMPLETED | OUTPATIENT
Start: 2024-08-28 | End: 2024-08-28

## 2024-08-28 RX ORDER — TALC
3 POWDER (GRAM) TOPICAL DAILY
Status: DISCONTINUED | OUTPATIENT
Start: 2024-08-28 | End: 2024-09-01

## 2024-08-28 RX ADMIN — ENOXAPARIN SODIUM 40 MG: 100 INJECTION SUBCUTANEOUS at 11:57

## 2024-08-28 RX ADMIN — SULFAMETHOXAZOLE AND TRIMETHOPRIM 1 TABLET: 800; 160 TABLET ORAL at 21:50

## 2024-08-28 RX ADMIN — SULFAMETHOXAZOLE AND TRIMETHOPRIM 1 TABLET: 800; 160 TABLET ORAL at 08:28

## 2024-08-28 RX ADMIN — PANTOPRAZOLE SODIUM 40 MG: 40 INJECTION, POWDER, FOR SOLUTION INTRAVENOUS at 21:50

## 2024-08-28 RX ADMIN — Medication 3 MG: at 17:20

## 2024-08-28 RX ADMIN — FOLIC ACID 1 MG: 1 TABLET ORAL at 08:28

## 2024-08-28 RX ADMIN — PANTOPRAZOLE SODIUM 80 MG: 40 INJECTION, POWDER, FOR SOLUTION INTRAVENOUS at 12:26

## 2024-08-28 RX ADMIN — THIAMINE HYDROCHLORIDE 100 MG: 100 INJECTION, SOLUTION INTRAMUSCULAR; INTRAVENOUS at 08:28

## 2024-08-28 RX ADMIN — POTASSIUM CHLORIDE 20 MEQ: 1500 TABLET, EXTENDED RELEASE ORAL at 14:20

## 2024-08-28 RX ADMIN — Medication 1 TABLET: at 08:28

## 2024-08-28 RX ADMIN — LOPERAMIDE HYDROCHLORIDE 2 MG: 2 CAPSULE ORAL at 17:20

## 2024-08-28 ASSESSMENT — PAIN SCALES - GENERAL
PAINLEVEL_OUTOF10: 0 - NO PAIN
PAINLEVEL_OUTOF10: 0 - NO PAIN

## 2024-08-28 ASSESSMENT — LIFESTYLE VARIABLES
ANXIETY: NO ANXIETY, AT EASE
ORIENTATION AND CLOUDING OF SENSORIUM: ORIENTED AND CAN DO SERIAL ADDITIONS
HEADACHE, FULLNESS IN HEAD: NOT PRESENT
AGITATION: NORMAL ACTIVITY
TOTAL SCORE: 0
NAUSEA AND VOMITING: NO NAUSEA AND NO VOMITING
AUDITORY DISTURBANCES: NOT PRESENT
TREMOR: NO TREMOR
VISUAL DISTURBANCES: NOT PRESENT
PAROXYSMAL SWEATS: NO SWEAT VISIBLE

## 2024-08-28 ASSESSMENT — COGNITIVE AND FUNCTIONAL STATUS - GENERAL
DRESSING REGULAR LOWER BODY CLOTHING: A LOT
CLIMB 3 TO 5 STEPS WITH RAILING: TOTAL
MOVING TO AND FROM BED TO CHAIR: TOTAL
WALKING IN HOSPITAL ROOM: TOTAL
TURNING FROM BACK TO SIDE WHILE IN FLAT BAD: A LOT
MOVING FROM LYING ON BACK TO SITTING ON SIDE OF FLAT BED WITH BEDRAILS: A LOT
HELP NEEDED FOR BATHING: A LOT
HELP NEEDED FOR BATHING: A LOT
WALKING IN HOSPITAL ROOM: TOTAL
DRESSING REGULAR LOWER BODY CLOTHING: A LOT
STANDING UP FROM CHAIR USING ARMS: TOTAL
TOILETING: A LOT
DAILY ACTIVITIY SCORE: 13
TURNING FROM BACK TO SIDE WHILE IN FLAT BAD: A LOT
MOVING TO AND FROM BED TO CHAIR: TOTAL
PERSONAL GROOMING: A LOT
MOVING FROM LYING ON BACK TO SITTING ON SIDE OF FLAT BED WITH BEDRAILS: A LOT
EATING MEALS: A LITTLE
MOBILITY SCORE: 8
EATING MEALS: A LITTLE
STANDING UP FROM CHAIR USING ARMS: TOTAL
DAILY ACTIVITIY SCORE: 14
MOBILITY SCORE: 8
CLIMB 3 TO 5 STEPS WITH RAILING: TOTAL
TOILETING: A LOT
DRESSING REGULAR UPPER BODY CLOTHING: A LITTLE
DRESSING REGULAR UPPER BODY CLOTHING: A LOT
PERSONAL GROOMING: A LOT

## 2024-08-28 ASSESSMENT — PAIN - FUNCTIONAL ASSESSMENT
PAIN_FUNCTIONAL_ASSESSMENT: 0-10
PAIN_FUNCTIONAL_ASSESSMENT: 0-10

## 2024-08-28 NOTE — CARE PLAN
The patient's goals for the shift include will be safe and vfrree from falls or injury during the shift    The clinical goals for the shift include Pt will have no tarry stool for the remainder of the vshift    Problem: Skin  Goal: Decreased wound size/increased tissue granulation at next dressing change  Outcome: Progressing  Goal: Participates in plan/prevention/treatment measures  Outcome: Progressing  Goal: Prevent/manage excess moisture  Outcome: Progressing  Goal: Prevent/minimize sheer/friction injuries  Outcome: Progressing  Goal: Promote/optimize nutrition  Outcome: Progressing  Goal: Promote skin healing  Outcome: Progressing     Problem: Respiratory  Goal: Clear secretions with interventions this shift  Outcome: Progressing  Goal: Minimize anxiety/maximize coping throughout shift  Outcome: Progressing  Goal: Minimal/no exertional discomfort or dyspnea this shift  Outcome: Progressing  Goal: No signs of respiratory distress (eg. Use of accessory muscles. Peds grunting)  Outcome: Progressing  Goal: Patent airway maintained this shift  Outcome: Progressing  Goal: Tolerate mechanical ventilation evidenced by VS/agitation level this shift  Outcome: Progressing  Goal: Tolerate pulmonary toileting this shift  Outcome: Progressing  Goal: Verbalize decreased shortness of breath this shift  Outcome: Progressing  Goal: Wean oxygen to maintain O2 saturation per order/standard this shift  Outcome: Progressing  Goal: Increase self care and/or family involvement in next 24 hours  Outcome: Progressing     Over the shift, the patient did not make progress toward the following goals. Barriers to progression include mobility, melena. Recommendations to address these barriers include provide optimal environment for ALS to prevent fatigue, monitor stools.

## 2024-08-28 NOTE — CARE PLAN
The patient's goals for the shift include      The clinical goals for the shift include Patient will remain safe during shift    Problem: Skin  Goal: Decreased wound size/increased tissue granulation at next dressing change  Outcome: Progressing  Flowsheets (Taken 8/27/2024 1709 by Kierra Glover RN)  Decreased wound size/increased tissue granulation at next dressing change:   Promote sleep for wound healing   Protective dressings over bony prominences  Goal: Participates in plan/prevention/treatment measures  Outcome: Progressing  Flowsheets (Taken 8/28/2024 0103)  Participates in plan/prevention/treatment measures: Increase activity/out of bed for meals  Goal: Prevent/manage excess moisture  Outcome: Progressing  Flowsheets (Taken 8/28/2024 0103)  Prevent/manage excess moisture: Cleanse incontinence/protect with barrier cream  Goal: Prevent/minimize sheer/friction injuries  Outcome: Progressing  Flowsheets (Taken 8/28/2024 0103)  Prevent/minimize sheer/friction injuries:   Use pull sheet   Increase activity/out of bed for meals  Goal: Promote/optimize nutrition  Outcome: Progressing  Flowsheets (Taken 8/28/2024 0103)  Promote/optimize nutrition: Offer water/supplements/favorite foods  Goal: Promote skin healing  Outcome: Progressing  Flowsheets (Taken 8/28/2024 0103)  Promote skin healing: Rotate device position/do not position patient on device

## 2024-08-28 NOTE — CONSULTS
Gastroenterology Consult Service INITIAL CONSULT Note  Department of Gastroenterology & Hepatology  [facility]    [facility]  August 28, 2024   Patient: Dinesh Mccall    Medical Record: [id number]    Reason for Consult: Melenic Stools     Subjective     Dinesh Mccall is a 79-year-old male with an unknown PMH who presented to the [facility] ED after falling at home. The patient states that he was in his usual state of health until January, at which time he developed a severe upper respiratory infection. Following this, he developed SOB with exertional dyspnea. Around March 27th, he noticed bilateral lower extremity edema following meals of ramen. He treated his leg sores for several weeks, which improved; however, he developed orthopnea and worsening exertional dyspnea. During this time, he also developed lower extremity weakness and had two falls. For the last four weeks, he has had worsening SOB and lower extremity pain. As a result, he was moving about his apartment using an office chair. On Saturday, he fell out of his chair, hitting his head, and was therefore brought to the hospital by ambulance. He states that for the last few days, he has experienced loose black stools since the initiation of Bactrim for his cellulitis. Prior to this, he endorses black stools with Pepto-Bismol use that resolved when he stopped the medication. He has taken Advil Dual Action for about a year, 2 tablets 3-4 times daily. He denies any other medication use, including PPIs. While admitted, his hemoglobin (Hgb) decreased from 7.2 to 6.6. He was ordered for 1 unit of PRBCs, which has not been given yet. Gastroenterology is consulted for concerns for an upper GI bleed.      12 point ROS otherwise negative, unless indicated above.     Past Medical History:  No past medical history on file.    Home Medications  No medications prior to admission.       Surgical History:  No past  surgical history on file.    Allergies:  No Known Allergies    Social History:    Social History     Socioeconomic History    Marital status: Single     Spouse name: Not on file    Number of children: Not on file    Years of education: Not on file    Highest education level: Not on file   Occupational History    Not on file   Tobacco Use    Smoking status: Former     Types: Cigarettes    Smokeless tobacco: Never   Substance and Sexual Activity    Alcohol use: Not on file    Drug use: Not on file    Sexual activity: Not on file   Other Topics Concern    Not on file   Social History Narrative    Not on file     Social Determinants of Health     Financial Resource Strain: Low Risk  (8/25/2024)    Overall Financial Resource Strain (CARDIA)     Difficulty of Paying Living Expenses: Not hard at all   Food Insecurity: Not on file   Transportation Needs: No Transportation Needs (8/25/2024)    PRAPARE - Transportation     Lack of Transportation (Medical): No     Lack of Transportation (Non-Medical): No   Physical Activity: Not on file   Stress: Not on file   Social Connections: Not on file   Intimate Partner Violence: Not on file   Housing Stability: Low Risk  (8/25/2024)    Housing Stability Vital Sign     Unable to Pay for Housing in the Last Year: No     Number of Times Moved in the Last Year: 0     Homeless in the Last Year: No       Family History:  No family history on file.  No family history of GI or liver disease.     Objective     Vitals:    Vitals:    08/27/24 1816 08/27/24 2300 08/28/24 0300 08/28/24 0829   BP: 125/75 124/75 (!) 151/92 154/89   BP Location: Right arm      Patient Position: Lying      Pulse: 78 80 90 89   Resp: 18 18 18 18   Temp: 36.5 °C (97.7 °F) 36.3 °C (97.3 °F) 36.1 °C (97 °F) 36.3 °C (97.3 °F)   TempSrc: Temporal      SpO2: 100% 99% 98% 97%   Weight:       Height:         Failed to redirect to the Timeline version of the REVFS SmartLink.    Intake/Output Summary (Last 24 hours) at 8/28/2024  "1231  Last data filed at 8/28/2024 1052  Gross per 24 hour   Intake 1070 ml   Output 2600 ml   Net -1530 ml       Physical Exam  Gen: well appearing, NAD  HEENT: PEERL, EOMI, sclera anicteric, no conjunctival injection,   Resp: Normal work of breathing   CV: RRR, no JVD  GI: non-tender, non-distended,   MSK: warm and well perfused, no edema, bilateral LE edema   Neuro: AAOx3, no focal deficits  Skin: warm and dry    Labs:   Lab Results   Component Value Date    WBC 12.2 (H) 08/28/2024    HGB 6.6 (L) 08/28/2024    HCT 20.4 (L) 08/28/2024    MCV 90 08/28/2024     08/28/2024     Lab Results   Component Value Date    GLUCOSE 127 (H) 08/28/2024    CALCIUM 7.5 (L) 08/28/2024     08/28/2024    K 3.3 (L) 08/28/2024    CO2 19 (L) 08/28/2024     08/28/2024    BUN 70 (H) 08/28/2024    CREATININE 2.55 (H) 08/28/2024     Lab Results   Component Value Date    ALT 23 08/28/2024    AST 19 08/28/2024    ALKPHOS 134 08/28/2024    BILITOT 0.5 08/28/2024     No results found for: \"INR\", \"PROTIME\"    Imaging:   === 08/25/24 ===    US LIVER WITH DOPPLER    - Impression -  1. Moderate volume ascites.  2. Hepatomegaly with diffusely increased echogenicity of the liver  which may be seen in the setting of steatosis. Correlate clinically  with liver enzymes.  3. Unremarkable Doppler interrogation of the liver.  4. Incidental note of a right pleural effusion.    I personally reviewed the images/study and I agree with the findings  as stated by Gaby Montoya MD. This study was interpreted at  [place], [place].    MACRO:  None    Signed by: Keron Abraham 8/27/2024 5:47 AM  Dictation workstation:   [id number]  === 08/25/24 ===    CT FOOT LEFT WO IV CONTRAST    - Impression -  1. Diffuse soft tissue swelling and edema with skin thickening and  skin surface ulceration of the dorsal midfoot. No underlying soft  tissue gas or collection to confirm the suspicion on recent " plain  film.. These findings are most suspicious for superficial soft tissue  infection/cellulitis.  2. No erosive change or other acute osseous abnormality to suggest  osteomyelitis.    I personally reviewed the image(s)/study and resident interpretation  as stated by Dr. Zaida Seo MD. I agree with the findings as  stated. This study was interpreted at [facility], [place], [place].    MACRO:  None    Signed by: Enrique Ayala 8/25/2024 1:24 PM  Dictation workstation:   [id number]      GI procedures:    Assessment & Plan   Dinesh Mccall is a 79-year-old male with an unknown PMH who presented to the [facility] ED after falling at home. Patient presented with lower extremity edema concerning for new onset CHF  and new concerns for upper GI bleeding with melena and a hgb drop from 7.2 to 6.6 following use of bactrim treatment for his cellulitis for which gastroenterology has been consulted.      #Melena   #Anemia   - EGD with anesthesia  - NPO at midnight   - Iron deficiency labs (completed)  - reticulocytes, LDH, haptoglobin   - transfuse for hgb of <7    Patient seen and discussed with attending, Dr. Ricardo Reina.     Jame Hickman MD, PhD  Gastroenterology Fellow, PGY-6  [facility]   Division of Gastroenterology and Liver Disease      Thank you for the consultation. Gastroenterology will continue to the follow the patient.   Please do not hesitate to contact me on Haiku or page [phone] if there are any further questions between the weekday hours of 7 AM - 5 PM.   If there is an urgent concern during the weekend, after-hours, or holidays; then please page the on-call GI fellow at [phone]. Thank you.    SIGNATURE: Jame Hickman MD PATIENT NAME: Dinesh Mccall   DATE: August 28, 2024 MRN: [id number]

## 2024-08-28 NOTE — PROGRESS NOTES
Transitional Care Coordination Progress Note:  Patient discussed during interdisciplinary rounds.  Team members present: MD, KATHI  Plan per Medical/Surgical team: Pt admitted with acute decompensated heart failure, plan to continue O2/NC and consult Cardiology team.  Payer: Medicaid  Status: Inpatient  Discharge disposition: Pending PT/OT evaluations, anticipate pt will discharge home with home care, or AR/SNF if agreeable.  Potential Barriers: none  ADOD: 8/30  SW notified of consult via secure chat from medical team for possible APS referral due to unsafe living arrangements. SW asked to complete initial discharge planning assessment at time of consult. Care coordinator will continue to follow for discharge planning needs.     Dallas Vicente RN  Transitional Care Coordinator (TCC)  [phone] or [phone]

## 2024-08-28 NOTE — CONSULTS
"Reason For Consult  New HFrEF    History Of Present Illness  Dinesh Mccall is a 79 y.o. male presenting initially to ED by EMS with fall at home on 8/24/24, now day four admission for acutely decompensated heart failure of unknown etiology with KIA, normocystic anemia, and left lower extremity cellulitis. Cardiology consulted for further management recommendations of acutely decompensated heart failure.    Mr. Mccall states he began experiencing shortness of breath on exertion in January 2024, after he experienced a non-specific viral illness that he describes as being \"flu-like\". He states he is able to climb 10-15 stairs before becoming short of breath and and lightheaded that this amount has been consistent since the shortness of breath on exertion began. He also experienced inability to lie flat in bed starting in January and states he has had to sit upright in a chair to sleep at night since then, however he wakes several times a night gasping for air and has had poor sleep as a result. To alleviate his fatigue, he drinks 1-2 shots of scotch occasionally at nighttime to help him sleep. He additionally states that he quit smoking in 2014. Prior to 2014, he smoked >1 pack per day and he began smoking in his early 20's.     In March 2024, he states that he ate ramen noodles for one week. At the end of the week, he noticed the sudden onset of bilateral leg swelling. He stopped eating ramen noodles as an attempt to decrease leg swelling. Around this same time, he also noticed ulcers on his legs that he self-managed by wrapping in bandages and changing those bandages every 3-4 days.     He additionally has had a few weeks of diarrhea and abdominal distention that he managed with daily PeptoBismol. He states his abdomen has been more distended and \"hard\", and that every time he eats, his abdomen becomes more hard and causes shortness of breath. He also noticed that the PeptoBismol gave him black, tarry stools, " but his stool color returned to normal once he stopped taking PeptoBismol. He believes the black tarry stools are a known side effect of PeptoBismol ingestion. He denies tinnitus, heart palpitations, numbness, tingling, weakness, hemoptysis, nausea, vomiting, diaphoresis, dysuria, hematuria.    Per chart review, he has been treated with IV diuretics (20 mg furosemide) BID for 3 days. He initially received vanc/zosyn on 8/25-8/26/24 for cellulitis but was switched to Bactrim on 8/26/24.    He states that last night was the first time he has been able to sleep lying flat since January after receiving IV Lasix. He denies any chest pain at rest or on exertion since symptom onset.     He states he used to swim everyday at [facility] and could manage swimming 1 hour per day. He has not been able to swim in several years. He additionally was able to run 3-4x/week for 5-6 miles per run until a few years ago. His family history is significant for congestive heart failure and death due to myocardial infarction at age 79 in his father. He additionally has a family history of diabetes.     Past Medical History  He has no past medical history on file.    Surgical History  He has no past surgical history on file.     Social History  He reports that he has quit smoking. His smoking use included cigarettes. He has never used smokeless tobacco. No history on file for alcohol use and drug use.    Family History  No family history on file.     Allergies  Patient has no known allergies.    Review of Systems  (+) dyspnea on exertion, paroxysmal nocturnal dyspnea, bilateral leg swelling, lightheadedness on exertion, abdominal distention, black tarry stools     Physical Exam  General: lying in bed in hospital gown appearing comfortable. Awake and alert.   HENT: 2cm dried blood on the top of his scalp at the hairline  Cardiovascular: bounding JVP visible on right neck. regular rate and rhythm with no murmurs, rubs, or gallops. 2+  "radial pulses bilaterally. Difficulty palpating dorsalis pedis or medial malleolar pulses bilaterally 2/2 tightened skin around the lower extremities. No bilateral pitting edema.  Pulmonary: lungs clear to auscultation bilaterally with no crackles, rales, wheezes, or rhonchi  Abdominal: distended and non-tender to palpation. No rebound, guarding, or rigidity.   Extremities: three gauze dressings applied to anterior left leg. ACE bandage wrap around right leg. There is hyperpigmentation and flaking of the lower legs bilaterally. There is poor foot care notable by broken toetails and dried blood underneath toenails. Left calf appears red but is not painful to palpation or out of proportion. Left calf is not warm to touch or swollen when compared to right calf.    Skin: flaky skin in bilateral lower extremities   Neurological: alert and oriented  Psychiatric: mood and affect appropriate for situation     Last Recorded Vitals  Blood pressure 154/89, pulse 89, temperature 36.3 °C (97.3 °F), resp. rate 18, height 1.803 m (5' 11\"), weight 104 kg (230 lb), SpO2 97%.    Relevant Results  Labs 8/28/24:  Magnesium: 2.09  CMP: Glc 127 K 3.3 Na 137 HCO3 19 BUN 70 Cr 2.55 eGFR 25 Calcium 7.5 Albumin 2.8 Protein 5.8  CBC w/diff: WBC 12.2 Hgb 6.6 Hct 20.4 Plt 262    Labs 8/26/24:  CRP 6.42    Labs 8/25/24:  CBC w/diff: WBC 16.5, Hgb 7.3-8.9 (no known baseline)  CMP: Cr 2.20 (no known baseline), BUN 68, Alk Phos 180  CRP 5.5, ESR 34, BNP 2182, A1c 6.1  UA notable for proteinuria to 30     Imaging 8/25/24:  CT head w/o IV contrast:  IMPRESSION:  1. No acute cortical infarct or acute intracranial hemorrhage.  2. Frontal scalp laceration without underlying osseous abnormality.    CXR 6:28am:  IMPRESSION:  1.  Large right-sided pleural effusion with likely adjacent airspace disease.  2. Left lung appears clear. No pneumothorax.    XR foot left:  IMPRESSION:  1. Suspected soft tissue gas of the left forefoot raising concern for deep soft " tissue infection.  2. Generalized soft tissue swelling extends along the dorsum of the foot and into the ankle and lower leg.  3. Possible skin wound of the dorsum of the forefoot.    CXR 11:39am:  IMPRESSION:  Bilateral pleural effusions and likely bilateral basilar airspace  disease right worse than left. Findings could represent some edema.    CT left foot w/o IV contrast:  IMPRESSION:  1. Diffuse soft tissue swelling and edema with skin thickening and skin surface ulceration of the dorsal midfoot. No underlying soft tissue gas or collection to confirm the suspicion on recent plain film. These findings are most suspicious for superficial soft tissue infection/cellulitis.  2. No erosive change or other acute osseous abnormality to suggest osteomyelitis.    Imaging 8/26/24:  Vascular US LE Venous Duplex BL:  PRELIMINARY CONCLUSIONS:  Right Lower Venous: No evidence of acute deep vein thrombus visualized in the right lower extremity. Calf veins were imaged segmentally.  Left Lower Venous: No evidence of acute deep vein thrombus visualized in the left lower extremity. Calf veins were imaged segmentally.    US liver with doppler:  LIVER:  The liver is enlarged, measuring 19.2 cm and is diffusely echogenic in appearance, consistent with diffuse fatty infiltration.  GALLBLADDER:  The gallbladder is nondistended, and demonstrates no evidence of gallstones, wall thickening or surrounding fluid. The gallbladder wall thickness is 0.29 cm . Sonographic Wise's sign is negative.  BILIARY SYSTEM:  No evidence of intra or extrahepatic biliary dilatation is  identified; the common bile duct measures 3.8 mm.  IMPRESSION:  1. Moderate volume ascites.  2. Hepatomegaly with diffusely increased echogenicity of the liver which may be seen in the setting of steatosis. Correlate clinically with liver enzymes.  3. Unremarkable Doppler interrogation of the liver.  4. Incidental note of a right pleural effusion.    Assessment/Plan  "  Dinesh Mccall is a 79 y.o. male presenting initially to ED by EMS with fall at home on 8/24/24, now day four admission for acutely decompensated heart failure of unknown etiology with KIA, normocytic anemia, and left lower extremity cellulitis. Cardiology consulted for further management recommendations of acutely decompensated heart failure likely secondary to viral myocarditis vs. Ischemic cardiomyopathy    Mr. Mccall's symptom onset began in January 2024 following a non-specific viral illness that he describes as being \"flu-like\". Given that he has associated symptoms such as swelling of the bilateral legs, dyspnea on exertion, and lightheadedness along with newly decreased EF of 15-20% on echocardiogram from 8/28/24 and elevated BNP to 2182, viral myocarditis is likely, however given that ischemic cardiomyopathy is the most common cause of HFrEF, he will require stress cardiac MRI on 8/29/24 to further evaluate cardiac functional status and exclude ischemic cardiomyopathy.      Given his symptomatic improvement with three days of 20mg IV Lasix BID with ~1L diuresis every day the past three days, we recommend continuing 20mg IV Lasix BID. We additionally recommend 10mg PO hydralazine TID an isosorbide mononitrate 10mg TID in lieu of Entresto, given the risk of worsening kidney function with Entresto. Given that his creatinine has been steadily increasing from 2.2 now up to 2.55 since hospital admission with no known baseline, we will hold off adding GDMT pending renal function stabilization.    #new HFrEF (EF 15-20%)  - stress cardiac MRI on 8/29/24  - Continue 20mg IV Lasix BID  - Start 10mg PO hydralazine TID and isosorbide mononitrate 10mg TID    Inna Azevedo, MS4    "

## 2024-08-28 NOTE — PROGRESS NOTES
Dinesh Mccall is a 79 y.o. male on day 3 of admission presenting with Acute decompensated heart failure (Multi).      Subjective   He is hemodynamically. Still have ongoing SOB while ambulating but reported feeling better after treatment with diuretics. Denied chest tightness or discomfort, denied palpitation. Can lie down to sleep yesterday (orthopnea improved). Have bowel movements 3-4 times since yesterday without blood/mucous and is having concern for the oral medications. Denied fever, N/V, abdominal pain. Still having mild discomfort due to abdominal distension.     10.30 am: notified from the nurse about passing melena. Patient described as pink content that later turned black. Denied dizziness/syncope.    Objective     Last Recorded Vitals  /89   Pulse 89   Temp 36.3 °C (97.3 °F)   Resp 18   Wt 104 kg (230 lb)   SpO2 97%   Intake/Output last 3 Shifts:    Intake/Output Summary (Last 24 hours) at 8/28/2024 1322  Last data filed at 8/28/2024 1052  Gross per 24 hour   Intake 1070 ml   Output 2600 ml   Net -1530 ml       Admission Weight  Weight: 104 kg (230 lb) (08/25/24 0415)    Daily Weight  08/25/24 : 104 kg (230 lb)    Physical Exam  Constitutional:       General: He is not in acute distress.     Appearance: He is obese. He is ill-appearing.   HENT:      Head: Normocephalic.      Comments: Patient has laceration and dried blood at the top of his forehead to the right near hairline.      Nose: No rhinorrhea.   Eyes:      Extraocular Movements: Extraocular movements intact.      Conjunctiva/sclera: Conjunctivae normal.      Pupils: Pupils are equal, round, and reactive to light.   Cardiovascular:      Rate and Rhythm: Normal rate and regular rhythm.      Comments: Heart sounds are distant. Dorsalis pedis artery obtained via doppler.  Pulmonary:      Effort: Pulmonary effort is normal. No respiratory distress.      Breath sounds: No stridor. No wheezing or rhonchi.     Mildly decreased breath  sound bilaterally at basal lung field.  Abdominal:      General: There is no distension.     Tenderness: There is no abdominal tenderness, no guarding.   Musculoskeletal:         General: Signs of injury present.      Cervical back: Normal range of motion.      Limited evaluation of pedal pitting edema due to wound      dressing at both legs  Neurological:      General: No focal deficit present.      Mental Status: He is alert.      Comments: Oriented and without FND spontaneous to conversation.    Psychiatric:         Mood and Affect: Mood normal.         Behavior: Behavior normal.     Relevant Results  Results for orders placed or performed during the hospital encounter of 08/25/24 (from the past 24 hour(s))   Renal function panel   Result Value Ref Range    Glucose 106 (H) 74 - 99 mg/dL    Sodium 137 136 - 145 mmol/L    Potassium 3.6 3.5 - 5.3 mmol/L    Chloride 103 98 - 107 mmol/L    Bicarbonate 21 21 - 32 mmol/L    Anion Gap 17 10 - 20 mmol/L    Urea Nitrogen 71 (H) 6 - 23 mg/dL    Creatinine 2.42 (H) 0.50 - 1.30 mg/dL    eGFR 27 (L) >60 mL/min/1.73m*2    Calcium 7.7 (L) 8.6 - 10.6 mg/dL    Phosphorus 5.8 (H) 2.5 - 4.9 mg/dL    Albumin 2.7 (L) 3.4 - 5.0 g/dL   Magnesium   Result Value Ref Range    Magnesium 2.00 1.60 - 2.40 mg/dL   Reticulocytes   Result Value Ref Range    Retic % 4.4 (H) 0.5 - 2.0 %    Retic Absolute 0.104 0.017 - 0.110 x10*6/uL    Reticulocyte Hemoglobin 23 (L) 28 - 38 pg    Immature Retic fraction 29.9 (H) <=16.0 %   CBC   Result Value Ref Range    WBC 12.2 (H) 4.4 - 11.3 x10*3/uL    nRBC 0.0 0.0 - 0.0 /100 WBCs    RBC 2.27 (L) 4.50 - 5.90 x10*6/uL    Hemoglobin 6.6 (L) 13.5 - 17.5 g/dL    Hematocrit 20.4 (L) 41.0 - 52.0 %    MCV 90 80 - 100 fL    MCH 29.1 26.0 - 34.0 pg    MCHC 32.4 32.0 - 36.0 g/dL    RDW 15.9 (H) 11.5 - 14.5 %    Platelets 262 150 - 450 x10*3/uL   Comprehensive metabolic panel   Result Value Ref Range    Glucose 127 (H) 74 - 99 mg/dL    Sodium 137 136 - 145 mmol/L     Potassium 3.3 (L) 3.5 - 5.3 mmol/L    Chloride 102 98 - 107 mmol/L    Bicarbonate 19 (L) 21 - 32 mmol/L    Anion Gap 19 10 - 20 mmol/L    Urea Nitrogen 70 (H) 6 - 23 mg/dL    Creatinine 2.55 (H) 0.50 - 1.30 mg/dL    eGFR 25 (L) >60 mL/min/1.73m*2    Calcium 7.5 (L) 8.6 - 10.6 mg/dL    Albumin 2.8 (L) 3.4 - 5.0 g/dL    Alkaline Phosphatase 134 33 - 136 U/L    Total Protein 5.8 (L) 6.4 - 8.2 g/dL    AST 19 9 - 39 U/L    Bilirubin, Total 0.5 0.0 - 1.2 mg/dL    ALT 23 10 - 52 U/L   Magnesium   Result Value Ref Range    Magnesium 2.09 1.60 - 2.40 mg/dL      Scheduled medications  enoxaparin, 40 mg, subcutaneous, q24h  folic acid, 1 mg, oral, Daily  melatonin, 3 mg, oral, Daily  multivitamin with minerals, 1 tablet, oral, Daily  pantoprazole, 40 mg, intravenous, q12h CORNELIO  perflutren protein A microsphere, 0.5 mL, intravenous, Once in imaging  sulfamethoxazole-trimethoprim, 1 tablet, oral, q12h CORNELIO  sulfur hexafluoride microsphr, 2 mL, intravenous, Once in imaging  [START ON 8/29/2024] thiamine, 100 mg, oral, Daily      Continuous medications     PRN medications     Assessment/Plan    Dinesh Mccall is a 79 year old man with unknown medical history presenting for fall with laceration in apparent acute decompensated heart failure of unclear etiology. He has severe bilateral LE edema with chronic skin changes and L dorsal mid foot cellulitis likely secondary to ulceration per CT read. Patient has signs via EMS, ED, primary team concerning for failure to thrive and social isolation. He is admitted for stabilization and work up of his cardiopulmonary status and cellulitis infection. He will need help from social work and likely rehab given long period of deconditioning.      # Acute Decompensated Heart Failure, unclear etiology with Right Bundle Branch Block, bilateral pleural effusion, bilateral LE edema - HF with reduced EF (unclear etiology) with suspected PHT (type 2)  Patient reports no history of heart failure or  cardiac event. No history of chest pain or heart attack. Some concern for reliability given unclear social status. His history and presentation is consistent with acute decompensated heart failure of unknown etiology. BNP >2000.   Differential diagnosis of etiologies of HFrEF    - Ischemic CM, given his age group, family history of MI and heart failure, and his EKG of RBBB and 50 pack year smoking history     - Alcohol-induced CM? (History of drinking)    - Post-viral myocarditis?    - Amyloidosis?   Admission weight 230lb. A1C 6.6%; LDL-C 36. HIV and TSH WNL.   - Continuous Telemetry  - Strict I&O with daily weights  - Diuresis: bid 20 mg lasix IV: I/O negative ~1-1.5L/day *3 days    lungs - less congestion, symptoms partially improved  - TTE 8/26/2024: LVEF 15-20%, global hypokinesia of LV, elevated mean LAP, moderate MR, moderate/severe TR, moderately enlarged RV, mild/moderately elevated PAP  Plan   - continue O2 cannula 2 LPM, keep SpO2 >94%    Will consider redose or up-titrate dose as per urine output  - Cardiology consult (HF, first presentation) for ischemic eval and    Optimal plan for HFrEF  - plan to consider starting GDMT if stable serum Cr + euvolemia achieved     # Left lower extremity infection, likely cellulitis per CT  Patient has LE edema bilaterally with more redness on the left. No pus noted. XR L Foot was suspicious for soft tissue gases. There is an ulceration on the anterior ankle but no open wound or evidence of pus. Culture were not taken prior to starting antibiotics. Follow up CT negative for deep space infection or osteo.   Antibiotics    - Vanc + Zosyn 8/25/2024 --> Vanc + ceftriaxone 8/25-8/26  Switched to oral antibiotics of bactrim 800 mg bid (since 8/26/24 - now). U/S Doppler DVT: no evidence of acute DVT both legs   Plan  - continue Bactrim (800) 2 tabs BID plan 5 days in total (d.3)  - Daily CBC, trend inflammatory markers: CRP 5.5 --> 6.42     # Mechanical Post fall   # Suspected  upper GI bleeding (non-variceal; suspected ulcer-related disease from chronic ibuprofen (advil) use) with anemia (Hb 7.2-7.3 --> 6.6)    - Ddx could have ACD/EMERY or anemia of CKD  CT Head negative. Patient complaining of R hip swelling this afternoon. Denies pain, but hyperventilated on palpation. Hemoglobin 8.9 AM-> 7.2 PM -> 7.3 on 8/26.   :: On 8/28 - notified of new episode of melena this morning.   - T&S in process   - s/p 1 stitch for forehead laceration notable blood loss  - F/u Right Hip XR - severe osteoporosis of both hip (R>L)  - Serum iron 28, %saturation 8%, TIBC 353, ferritin 40   - corrected reticulocyte count 2.2%, index 1.15 (Ret-Hb 23 - low)     --> likely hypoproliferative anemia  Plan  - F/U CBC daily, keep Hb >7 g/dL  - transfuse PRC 1 Unit  - consult GI for role of EGD, start IV pantoprazole 80 mg IV stat then    Followed by 40 mg IV bid     # KIA vs CKD (Cr 2.2, no prior Cr level)  Possibly pre-renal related to ADHF presentation. High protein in urine, consider nephrotic syndrome, intrinsic injury in context of ongoing ibuprofen use. Given poor cardiac function, cardiorenal syndrome could also contribute to his serum Cr rising. F/U Cr 2.2 -> 2.59 -> 2.48 -> 2.44 -> 2.57.  - urine elytes: urine Na <10, urine Cr 82.3, Urine K/Cr 60  Plan  - Continue diuresis until euvolemia achieved  - Avoid nephrotoxic medications  - Monitoring of RFP daily to twice daily     # Alcohol use  Patient mentioned multiple shots a day to help him sleep, later said one one shot with soda. He endorses drinking to drowsiness every night. Elevated alk phos.  - CIWA protocol discontinued on 8/27/2024     # Insomnia  - on Melatonin 3 mg po at night    # Watery diarrhea (8/28/2024) - watery, no pus/mucus/blood  - no signs of abdominal pain, N/V  - Differential diagnosis: drug-induced (bactrim, furosemide)     # Disposition: patients prefer [facility], has PCP  Patient likely not safe to go home for now. Will need social work and  APS check on his house.     P: Be careful given ADHF  E: PRN  N: NPO pending need for procedure  GI PPX: none  DVT PPX: SCDs, Lovenox ordered but held for bleeding risk  ABX: Vanc + Ceftriaxone  NOK: Skyla Kincaid (Sister) [phone] or [phone]      Ziggy Webb MD  PGY-1, Internal Medicine/Medical Genetics

## 2024-08-29 LAB
ALBUMIN SERPL BCP-MCNC: 2.8 G/DL (ref 3.4–5)
ALBUMIN SERPL BCP-MCNC: 2.8 G/DL (ref 3.4–5)
ALP SERPL-CCNC: 135 U/L (ref 33–136)
ALT SERPL W P-5'-P-CCNC: 21 U/L (ref 10–52)
ANION GAP SERPL CALC-SCNC: 14 MMOL/L (ref 10–20)
ANION GAP SERPL CALC-SCNC: 18 MMOL/L (ref 10–20)
AST SERPL W P-5'-P-CCNC: 19 U/L (ref 9–39)
BILIRUB SERPL-MCNC: 0.7 MG/DL (ref 0–1.2)
BUN SERPL-MCNC: 53 MG/DL (ref 6–23)
BUN SERPL-MCNC: 59 MG/DL (ref 6–23)
CALCIUM SERPL-MCNC: 7.5 MG/DL (ref 8.6–10.6)
CALCIUM SERPL-MCNC: 7.7 MG/DL (ref 8.6–10.6)
CHLORIDE SERPL-SCNC: 103 MMOL/L (ref 98–107)
CHLORIDE SERPL-SCNC: 104 MMOL/L (ref 98–107)
CO2 SERPL-SCNC: 21 MMOL/L (ref 21–32)
CO2 SERPL-SCNC: 21 MMOL/L (ref 21–32)
CREAT SERPL-MCNC: 2.41 MG/DL (ref 0.5–1.3)
CREAT SERPL-MCNC: 2.49 MG/DL (ref 0.5–1.3)
EGFRCR SERPLBLD CKD-EPI 2021: 26 ML/MIN/1.73M*2
EGFRCR SERPLBLD CKD-EPI 2021: 27 ML/MIN/1.73M*2
ERYTHROCYTE [DISTWIDTH] IN BLOOD BY AUTOMATED COUNT: 16.3 % (ref 11.5–14.5)
ERYTHROCYTE [DISTWIDTH] IN BLOOD BY AUTOMATED COUNT: 16.3 % (ref 11.5–14.5)
GLUCOSE SERPL-MCNC: 122 MG/DL (ref 74–99)
GLUCOSE SERPL-MCNC: 127 MG/DL (ref 74–99)
HCT VFR BLD AUTO: 25 % (ref 41–52)
HCT VFR BLD AUTO: 25.2 % (ref 41–52)
HGB BLD-MCNC: 7.5 G/DL (ref 13.5–17.5)
HGB BLD-MCNC: 7.6 G/DL (ref 13.5–17.5)
MAGNESIUM SERPL-MCNC: 2.06 MG/DL (ref 1.6–2.4)
MCH RBC QN AUTO: 29.3 PG (ref 26–34)
MCH RBC QN AUTO: 29.5 PG (ref 26–34)
MCHC RBC AUTO-ENTMCNC: 30 G/DL (ref 32–36)
MCHC RBC AUTO-ENTMCNC: 30.2 G/DL (ref 32–36)
MCV RBC AUTO: 98 FL (ref 80–100)
MCV RBC AUTO: 98 FL (ref 80–100)
NRBC BLD-RTO: 0.3 /100 WBCS (ref 0–0)
NRBC BLD-RTO: 0.3 /100 WBCS (ref 0–0)
PHOSPHATE SERPL-MCNC: 4 MG/DL (ref 2.5–4.9)
PLATELET # BLD AUTO: 239 X10*3/UL (ref 150–450)
PLATELET # BLD AUTO: 258 X10*3/UL (ref 150–450)
POTASSIUM SERPL-SCNC: 3.4 MMOL/L (ref 3.5–5.3)
POTASSIUM SERPL-SCNC: 3.5 MMOL/L (ref 3.5–5.3)
PROT SERPL-MCNC: 5.5 G/DL (ref 6.4–8.2)
RBC # BLD AUTO: 2.56 X10*6/UL (ref 4.5–5.9)
RBC # BLD AUTO: 2.58 X10*6/UL (ref 4.5–5.9)
SODIUM SERPL-SCNC: 136 MMOL/L (ref 136–145)
SODIUM SERPL-SCNC: 138 MMOL/L (ref 136–145)
WBC # BLD AUTO: 11.8 X10*3/UL (ref 4.4–11.3)
WBC # BLD AUTO: 13 X10*3/UL (ref 4.4–11.3)

## 2024-08-29 PROCEDURE — 85027 COMPLETE CBC AUTOMATED: CPT

## 2024-08-29 PROCEDURE — 2500000001 HC RX 250 WO HCPCS SELF ADMINISTERED DRUGS (ALT 637 FOR MEDICARE OP)

## 2024-08-29 PROCEDURE — 2500000002 HC RX 250 W HCPCS SELF ADMINISTERED DRUGS (ALT 637 FOR MEDICARE OP, ALT 636 FOR OP/ED)

## 2024-08-29 PROCEDURE — 80069 RENAL FUNCTION PANEL: CPT | Mod: CCI

## 2024-08-29 PROCEDURE — 97162 PT EVAL MOD COMPLEX 30 MIN: CPT | Mod: GP

## 2024-08-29 PROCEDURE — 99233 SBSQ HOSP IP/OBS HIGH 50: CPT

## 2024-08-29 PROCEDURE — 97530 THERAPEUTIC ACTIVITIES: CPT | Mod: GP

## 2024-08-29 PROCEDURE — 80053 COMPREHEN METABOLIC PANEL: CPT

## 2024-08-29 PROCEDURE — 1200000002 HC GENERAL ROOM WITH TELEMETRY DAILY

## 2024-08-29 PROCEDURE — 99233 SBSQ HOSP IP/OBS HIGH 50: CPT | Performed by: STUDENT IN AN ORGANIZED HEALTH CARE EDUCATION/TRAINING PROGRAM

## 2024-08-29 PROCEDURE — 36415 COLL VENOUS BLD VENIPUNCTURE: CPT

## 2024-08-29 PROCEDURE — 2500000004 HC RX 250 GENERAL PHARMACY W/ HCPCS (ALT 636 FOR OP/ED)

## 2024-08-29 PROCEDURE — 97165 OT EVAL LOW COMPLEX 30 MIN: CPT | Mod: GO

## 2024-08-29 PROCEDURE — 83735 ASSAY OF MAGNESIUM: CPT

## 2024-08-29 PROCEDURE — 97535 SELF CARE MNGMENT TRAINING: CPT | Mod: GO

## 2024-08-29 RX ORDER — LOPERAMIDE HYDROCHLORIDE 2 MG/1
2 CAPSULE ORAL 4 TIMES DAILY PRN
Status: DISCONTINUED | OUTPATIENT
Start: 2024-08-29 | End: 2024-09-02

## 2024-08-29 RX ORDER — HYDRALAZINE HYDROCHLORIDE 10 MG/1
10 TABLET, FILM COATED ORAL 3 TIMES DAILY
Status: DISCONTINUED | OUTPATIENT
Start: 2024-08-29 | End: 2024-09-07

## 2024-08-29 RX ORDER — FUROSEMIDE 10 MG/ML
20 INJECTION INTRAMUSCULAR; INTRAVENOUS ONCE
Status: COMPLETED | OUTPATIENT
Start: 2024-08-29 | End: 2024-08-29

## 2024-08-29 RX ORDER — ISOSORBIDE DINITRATE 10 MG/1
10 TABLET ORAL
Status: DISCONTINUED | OUTPATIENT
Start: 2024-08-29 | End: 2024-09-04

## 2024-08-29 RX ORDER — POTASSIUM CHLORIDE 20 MEQ/1
20 TABLET, EXTENDED RELEASE ORAL ONCE
Status: DISCONTINUED | OUTPATIENT
Start: 2024-08-29 | End: 2024-08-29

## 2024-08-29 RX ORDER — POTASSIUM CHLORIDE 20 MEQ/1
20 TABLET, EXTENDED RELEASE ORAL EVERY 6 HOURS
Status: COMPLETED | OUTPATIENT
Start: 2024-08-29 | End: 2024-08-29

## 2024-08-29 RX ADMIN — PANTOPRAZOLE SODIUM 40 MG: 40 INJECTION, POWDER, FOR SOLUTION INTRAVENOUS at 08:22

## 2024-08-29 RX ADMIN — SULFAMETHOXAZOLE AND TRIMETHOPRIM 1 TABLET: 800; 160 TABLET ORAL at 08:22

## 2024-08-29 RX ADMIN — ISOSORBIDE DINITRATE 10 MG: 10 TABLET ORAL at 18:23

## 2024-08-29 RX ADMIN — SULFAMETHOXAZOLE AND TRIMETHOPRIM 1 TABLET: 800; 160 TABLET ORAL at 20:21

## 2024-08-29 RX ADMIN — POTASSIUM CHLORIDE 20 MEQ: 1500 TABLET, EXTENDED RELEASE ORAL at 15:24

## 2024-08-29 RX ADMIN — LOPERAMIDE HYDROCHLORIDE 2 MG: 2 CAPSULE ORAL at 15:24

## 2024-08-29 RX ADMIN — Medication 1 TABLET: at 08:22

## 2024-08-29 RX ADMIN — PANTOPRAZOLE SODIUM 40 MG: 40 INJECTION, POWDER, FOR SOLUTION INTRAVENOUS at 20:21

## 2024-08-29 RX ADMIN — FUROSEMIDE 20 MG: 10 INJECTION, SOLUTION INTRAMUSCULAR; INTRAVENOUS at 12:56

## 2024-08-29 RX ADMIN — HYDRALAZINE HYDROCHLORIDE 10 MG: 10 TABLET ORAL at 15:24

## 2024-08-29 RX ADMIN — THIAMINE HCL TAB 100 MG 100 MG: 100 TAB at 08:22

## 2024-08-29 RX ADMIN — HYDRALAZINE HYDROCHLORIDE 10 MG: 10 TABLET ORAL at 20:21

## 2024-08-29 RX ADMIN — Medication 3 MG: at 18:02

## 2024-08-29 RX ADMIN — POTASSIUM CHLORIDE 20 MEQ: 1500 TABLET, EXTENDED RELEASE ORAL at 20:21

## 2024-08-29 RX ADMIN — FOLIC ACID 1 MG: 1 TABLET ORAL at 08:22

## 2024-08-29 ASSESSMENT — ACTIVITIES OF DAILY LIVING (ADL): HOME_MANAGEMENT_TIME_ENTRY: 14

## 2024-08-29 ASSESSMENT — COGNITIVE AND FUNCTIONAL STATUS - GENERAL
MOVING TO AND FROM BED TO CHAIR: TOTAL
MOBILITY SCORE: 11
WALKING IN HOSPITAL ROOM: A LOT
DRESSING REGULAR UPPER BODY CLOTHING: A LITTLE
STANDING UP FROM CHAIR USING ARMS: A LOT
TOILETING: A LITTLE
DAILY ACTIVITIY SCORE: 13
WALKING IN HOSPITAL ROOM: TOTAL
CLIMB 3 TO 5 STEPS WITH RAILING: TOTAL
MOVING TO AND FROM BED TO CHAIR: A LOT
PERSONAL GROOMING: A LITTLE
MOBILITY SCORE: 8
MOVING FROM LYING ON BACK TO SITTING ON SIDE OF FLAT BED WITH BEDRAILS: A LOT
CLIMB 3 TO 5 STEPS WITH RAILING: TOTAL
PERSONAL GROOMING: A LITTLE
WALKING IN HOSPITAL ROOM: A LOT
EATING MEALS: A LITTLE
DRESSING REGULAR LOWER BODY CLOTHING: A LOT
TOILETING: A LOT
DAILY ACTIVITIY SCORE: 17
MOVING FROM LYING ON BACK TO SITTING ON SIDE OF FLAT BED WITH BEDRAILS: A LOT
HELP NEEDED FOR BATHING: A LOT
HELP NEEDED FOR BATHING: A LOT
DRESSING REGULAR UPPER BODY CLOTHING: A LITTLE
DRESSING REGULAR LOWER BODY CLOTHING: A LOT
HELP NEEDED FOR BATHING: A LOT
STANDING UP FROM CHAIR USING ARMS: TOTAL
TURNING FROM BACK TO SIDE WHILE IN FLAT BAD: A LOT
DAILY ACTIVITIY SCORE: 15
PERSONAL GROOMING: A LOT
TURNING FROM BACK TO SIDE WHILE IN FLAT BAD: A LITTLE
CLIMB 3 TO 5 STEPS WITH RAILING: TOTAL
MOBILITY SCORE: 15
TURNING FROM BACK TO SIDE WHILE IN FLAT BAD: A LOT
DRESSING REGULAR UPPER BODY CLOTHING: A LOT
STANDING UP FROM CHAIR USING ARMS: A LOT
MOVING TO AND FROM BED TO CHAIR: A LITTLE
TOILETING: TOTAL
DRESSING REGULAR LOWER BODY CLOTHING: A LOT

## 2024-08-29 ASSESSMENT — PAIN - FUNCTIONAL ASSESSMENT
PAIN_FUNCTIONAL_ASSESSMENT: 0-10

## 2024-08-29 ASSESSMENT — PAIN SCALES - GENERAL
PAINLEVEL_OUTOF10: 0 - NO PAIN

## 2024-08-29 ASSESSMENT — COLUMBIA-SUICIDE SEVERITY RATING SCALE - C-SSRS: 1. IN THE PAST MONTH, HAVE YOU WISHED YOU WERE DEAD OR WISHED YOU COULD GO TO SLEEP AND NOT WAKE UP?: NO

## 2024-08-29 NOTE — CARE PLAN
The patient's goals for the shift include will be safe and free from injury during the shift    The clinical goals for the shift include Pt will have no balck or tarry stool by the end of the shift    Problem: Skin  Goal: Decreased wound size/increased tissue granulation at next dressing change  Flowsheets (Taken 8/29/2024 1639)  Decreased wound size/increased tissue granulation at next dressing change:   Promote sleep for wound healing   Protective dressings over bony prominences  Goal: Participates in plan/prevention/treatment measures  Flowsheets (Taken 8/29/2024 1639)  Participates in plan/prevention/treatment measures:   Discuss with provider PT/OT consult   Elevate heels   Increase activity/out of bed for meals  Goal: Prevent/manage excess moisture  Flowsheets (Taken 8/29/2024 1639)  Prevent/manage excess moisture:   Cleanse incontinence/protect with barrier cream   Follow provider orders for dressing changes   Moisturize dry skin  Goal: Prevent/minimize sheer/friction injuries  Flowsheets (Taken 8/29/2024 1639)  Prevent/minimize sheer/friction injuries:   Complete micro-shifts as needed if patient unable. Adjust patient position to relieve pressure points, not a full turn   Increase activity/out of bed for meals   Use pull sheet  Goal: Promote/optimize nutrition  Flowsheets (Taken 8/29/2024 1639)  Promote/optimize nutrition: Monitor/record intake including meals  Goal: Promote skin healing  Flowsheets (Taken 8/29/2024 1639)  Promote skin healing:   Assess skin/pad under line(s)/device(s)   Protective dressings over bony prominences     Over the shift, the patient did not make progress toward the following goals. Barriers to progression include mobility, weakness and GIB . Recommendations to address these barriers include assist with transfers, and provide optimal environment for ambulation.

## 2024-08-29 NOTE — PROGRESS NOTES
Transitional Care Coordination Progress Note:  Patient discussed during interdisciplinary rounds.  Team members present: MD, KATHI  Plan per Medical/Surgical team: Pt admitted after a fall at home, plan to continue O2/2L NC, EGD today for c/f melena 2/2 GIB, and cardiac stress MRI tomorrow.  Payer: [employer]  Status: Inpatient  Discharge disposition: PT/OT evaluations are pending, anticipate pt will discharge to [facility] vs SNF if agreeable.  Potential Barriers: none  ADOD: 9/1  Care coordinator will continue to follow for discharge planning needs.     Dallas Vicente RN  Transitional Care Coordinator (TCC)  [phone] or [phone]

## 2024-08-29 NOTE — PROGRESS NOTES
Gastroenterology Consult Service Progress Note  Department of Gastroenterology & Hepatology  [facility]    [facility]  August 29, 2024   Patient: Dinesh Mccall    Medical Record: [id number]  Reason for Initial Consult: Melenic Stools    Interval History:   - EGD cancelled due to low EF pending cardiac stress test   - patient scheduled for cardiac stress  - continues to have liquid stools, the same that he has experienced intermittently for months, possibly secondary to bactrim use    Physical Exam:  Vitals:    08/28/24 1643 08/28/24 1728 08/28/24 1841 08/29/24 0912   BP: 123/74 134/87 140/89 139/87   Pulse: 87 85 92 88   Resp: 18 18 19 18   Temp: 36.4 °C (97.5 °F) 36.5 °C (97.7 °F) 36.5 °C (97.7 °F) 36.5 °C (97.7 °F)   TempSrc: Temporal Temporal Temporal Temporal   SpO2: 98% 100% 100% 100%   Weight:       Height:             Intake/Output Summary (Last 24 hours) at 8/29/2024 1039  Last data filed at 8/29/2024 0830  Gross per 24 hour   Intake 1020.5 ml   Output 750 ml   Net 270.5 ml       GGen: chronically ill appearing, NAD  HEENT: PEERL, EOMI, sclera anicteric, no conjunctival injection,   Resp: Normal work of breathing   CV: RRR, no JVD  GI: non-tender, non-distended,   MSK: warm and well perfused, no edema, bilateral LE edema   Neuro: AAOx3, no focal deficits  Skin: warm and dry    Labs:  Lab Results   Component Value Date    WBC 11.8 (H) 08/29/2024    HGB 7.6 (L) 08/29/2024    HCT 25.2 (L) 08/29/2024    MCV 98 08/29/2024     08/29/2024     Lab Results   Component Value Date    GLUCOSE 127 (H) 08/29/2024    CALCIUM 7.5 (L) 08/29/2024     08/29/2024    K 3.4 (L) 08/29/2024    CO2 21 08/29/2024     08/29/2024    BUN 59 (H) 08/29/2024    CREATININE 2.41 (H) 08/29/2024     Lab Results   Component Value Date    ALT 21 08/29/2024    AST 19 08/29/2024    ALKPHOS 135 08/29/2024    BILITOT 0.7 08/29/2024       Imaging:  === 08/25/24 ===    US LIVER  "WITH DOPPLER    - Impression -  1. Moderate volume ascites.  2. Hepatomegaly with diffusely increased echogenicity of the liver  which may be seen in the setting of steatosis. Correlate clinically  with liver enzymes.  3. Unremarkable Doppler interrogation of the liver.  4. Incidental note of a right pleural effusion.    I personally reviewed the images/study and I agree with the findings  as stated by Gaby Montoya MD. This study was interpreted at  [place], [place].    MACRO:  None    Signed by: Keron Abraham 8/27/2024 5:47 AM  Dictation workstation:   [id number]  === 08/25/24 ===    CT FOOT LEFT WO IV CONTRAST    - Impression -  1. Diffuse soft tissue swelling and edema with skin thickening and  skin surface ulceration of the dorsal midfoot. No underlying soft  tissue gas or collection to confirm the suspicion on recent plain  film.. These findings are most suspicious for superficial soft tissue  infection/cellulitis.  2. No erosive change or other acute osseous abnormality to suggest  osteomyelitis.    I personally reviewed the image(s)/study and resident interpretation  as stated by Dr. Zaida Seo MD. I agree with the findings as  stated. This study was interpreted at [place], [place].    MACRO:  None    Signed by: Enrique Ayala 8/25/2024 1:24 PM  Dictation workstation:   [id number]    Cardiology Procedures:  Transthoracic Echo (TTE) Complete With Contrast    Height: 1.803 m (5' 11\")   Weight: 104 kg (230 lb)   Blood Pressure: Not recorded    Date of Study: 8/26/24   Ordering Provider: Jagjit Montano MD   Clinical Indications: C/f ADHF       Reading Physicians  Performing Staff   Cardiology: Nolan Martinez MD    Tech: RT Kriss        Indications  Priority: Priority Discharge or Observation  C/f ADHF   Dx: SOB (shortness of breath) [R06.02 (ICD-10-CM)]     PACS Images     Show images for " Transthoracic Echo (TTE) Complete  Interpretation Summary  Show Result Comparison   [facility], [address] [place] [address], [place] [address]                 Tel [phone] and Fax [phone]     TRANSTHORACIC ECHOCARDIOGRAM REPORT        Patient Name:      LINDA ANTHONY SHAE  Reading Physician:    [id number] Nolan Martinez MD  Study Date:        8/26/2024            Ordering Provider:    [id number] BRENDAN TY  MRN/PID:           [id number]             Fellow:  Accession#:        [id number]         Nurse:                Joan Guillen RN  Date of Birth/Age: 1945 / 79 years  Sonographer:          Pearl Carter RDCS  Gender:            M                    Additional Staff:  Height:            180.34 cm            Admit Date:           8/25/2024  Weight:            104.33 kg            Admission Status:     Inpatient -                                                                Priority discharge  BSA / BMI:         2.24 m2 / 32.08      Encounter#:           [id number]                     kg/m2  Blood Pressure:    129/77 mmHg          Department Location:  [facility] Non                                                                Invasive     Study Type:    TRANSTHORACIC ECHO (TTE) COMPLETE  Diagnosis/ICD: Shortness of breath-R06.02  Indication:    SOB, c/f ADHF  CPT Code:      Echo Complete w Full Doppler-[id number]     Patient History:  Pertinent History: Acute decompensated heart failure.     Study Detail: The following Echo studies were performed: 2D, M-Mode, Doppler and                color flow. Technically challenging study due to prominent lung                artifact and patient lying in supine position. Definity used as a                contrast agent for endocardial border definition and agitated                saline used as a contrast agent for intraseptal flow  evaluation.                Total contrast used for this procedure was 1.5 mL via IV push.        Critical Event  Critical Event: Test was completed as per department protocol.  Critical Finding: Unknown low EF.  Time Test was Completed: 9:30:00 AM  Notified: Dr. Martinez.  Attending notification time: 9:38:13 AM     PHYSICIAN INTERPRETATION:  Left Ventricle: The left ventricular systolic function is severely decreased, with a visually estimated ejection fraction of 15-20%. There is global hypokinesis of the left ventricle with minor regional variations. The left ventricular cavity size is severely dilated. There is mild eccentric left ventricular hypertrophy. Spectral Doppler shows a pseudonormal pattern of left ventricular diastolic filling. There is an elevated mean left atrial pressure. There is no definite left ventricular thrombus visualized.  Left Atrium: The left atrium is severely dilated. There is no evidence of a patent foramen ovale. A bubble study using agitated saline was performed. Bubble study is negative.  Right Ventricle: The right ventricle is moderately enlarged. There is low normal right ventricular systolic function.  Right Atrium: The right atrium is severely dilated.  Aortic Valve: The aortic valve is trileaflet. There is mild aortic valve cusp calcification. There is mild aortic valve thickening. There is evidence of mildly elevated transaortic gradients consistent with sclerosis of the aortic valve. The aortic valve dimensionless index is 0.54. There is trace aortic valve regurgitation. The peak instantaneous gradient of the aortic valve is 13.1 mmHg. The mean gradient of the aortic valve is 7.0 mmHg.  Mitral Valve: The mitral valve is mildly thickened. There is mild mitral annular calcification. There is moderate mitral valve regurgitation. The mitral regurgitant orifice area is 12 mm2. The mitral regurgitant volume is 17.53 ml.  Tricuspid Valve: The tricuspid valve is structurally normal.  There is moderate to severe tricuspid regurgitation.  Pulmonic Valve: The pulmonic valve is structurally normal. There is trace pulmonic valve regurgitation.  Pericardium: There is a trivial pericardial effusion.  Aorta: The aortic root is normal.  Pulmonary Artery: The tricuspid regurgitant velocity is 2.78 m/s, and with an estimated right atrial pressure of 15 mmHg, the estimated pulmonary artery pressure is mild to moderately elevated with the RVSP at 45.9 mmHg.  Systemic Veins: The inferior vena cava appears dilated. There is poor inspiratory collapse of the IVC (less than 50%), consistent with elevated right atrial pressure.        CONCLUSIONS:   1. The left ventricular systolic function is severely decreased, with a visually estimated ejection fraction of 15-20%.   2. There is global hypokinesis of the left ventricle with minor regional variations.   3. Spectral Doppler shows a pseudonormal pattern of left ventricular diastolic filling.   4. There is an elevated mean left atrial pressure.   5. Left ventricular cavity size is severely dilated.   6. No left ventricular thrombus visualized.   7. There is mild eccentric left ventricular hypertrophy.   8. There is low normal right ventricular systolic function.   9. Moderately enlarged right ventricle.  10. The left atrium is severely dilated.  11. The right atrium is severely dilated.  12. Moderate mitral valve regurgitation.  13. Moderate to severe tricuspid regurgitation visualized.  14. Aortic valve sclerosis.  15. Mild to moderately elevated pulmonary artery pressure.  16. There is no evidence of a patent foramen ovale.     QUANTITATIVE DATA SUMMARY:  2D MEASUREMENTS:                           Normal Ranges:  Ao Root d:     3.21 cm   (2.0-3.7cm)  LAs:           4.80 cm   (2.7-4.0cm)  IVSd:          1.00 cm   (0.6-1.1cm)  LVPWd:         0.80 cm   (0.6-1.1cm)  LVIDd:         7.30 cm   (3.9-5.9cm)  LVIDs:         6.50 cm  LV Mass Index: 136 g/m2  LVEDV Index:    117 ml/m2  LV % FS        11.0 %     LA VOLUME:                                Normal Ranges:  LA Vol A4C:        122.6 ml   (22+/-6mL/m2)  LA Vol A2C:        136.3 ml  LA Vol BP:         130.7 ml  LA Vol Index A4C:  54.8ml/m2  LA Vol Index A2C:  60.9 ml/m2  LA Vol Index BP:   58.4 ml/m2  LA Area A4C:       33.5 cm2  LA Area A2C:       35.7 cm2  LA Major Axis A4C: 7.8 cm  LA Major Axis A2C: 8.0 cm     RA VOLUME BY A/L METHOD:                        Normal Ranges:  RA Area A4C: 33.3 cm2     M-MODE MEASUREMENTS:                   Normal Ranges:  Ao Root: 3.20 cm (2.0-3.7cm)     AORTA MEASUREMENTS:                     Normal Ranges:  Asc Ao, d: 3.40 cm (2.1-3.4cm)     LV SYSTOLIC FUNCTION BY 2D PLANIMETRY (MOD):                       Normal Ranges:  EF-A4C View:    26 % (>=55%)  EF-A2C View:    6 %  EF-Biplane:     17 %  EF-Visual:      18 %  LV EF Reported: 18 %     LV DIASTOLIC FUNCTION:                           Normal Ranges:  MV Peak E:    1.20 m/s   (0.7-1.2 m/s)  MV Peak A:    0.83 m/s   (0.42-0.7 m/s)  E/A Ratio:    1.45       (1.0-2.2)  MV e'         0.065 m/s  (>8.0)  MV lateral e' 0.09 m/s  MV medial e'  0.04 m/s  MV A Dur:     90.00 msec  E/e' Ratio:   18.46      (<8.0)     MITRAL VALVE:                       Normal Ranges:  MV Vmax:    1.15 m/s (<=1.3m/s)  MV peak P.3 mmHg (<5mmHg)  MV mean PG: 3.0 mmHg (<2mmHg)  MV DT:      119 msec (150-240msec)     MITRAL INSUFFICIENCY:                            Normal Ranges:  PISA Radius:  0.5 cm  MR VTI:       150.00 cm  MR Vmax:      500.00 cm/s  MR Alias Mark: 37.2 cm/s  MR Volume:    17.53 ml  MR Flow Rt:   58.43 ml/s  MR EROA:      12 mm2     AORTIC VALVE:                                     Normal Ranges:  AoV Vmax:                1.81 m/s  (<=1.7m/s)  AoV Peak P.1 mmHg (<20mmHg)  AoV Mean P.0 mmHg  (1.7-11.5mmHg)  LVOT Max Mark:            1.04 m/s  (<=1.1m/s)  AoV VTI:                 30.10 cm  (18-25cm)  LVOT VTI:                 16.30 cm  LVOT Diameter:           2.30 cm   (1.8-2.4cm)  AoV Area, VTI:           2.25 cm2  (2.5-5.5cm2)  AoV Area,Vmax:           2.39 cm2  (2.5-4.5cm2)  AoV Dimensionless Index: 0.54        RIGHT VENTRICLE:  TAPSE: 30.5 mm  RV s'  0.13 m/s     TRICUSPID VALVE/RVSP:                              Normal Ranges:  Peak TR Velocity: 2.78 m/s  RV Syst Pressure: 45.9 mmHg (< 30mmHg)  IVC Diam:         3.00 cm     PULMONIC VALVE:                          Normal Ranges:  PV Accel Time: 95 msec  (>120ms)  PV Max Mark:    1.0 m/s  (0.6-0.9m/s)  PV Max PG:     3.8 mmHg        [id number] Nolan Martinez MD  Electronically signed on 8/26/2024 at 9:53:20 AM           ** Final **    GI procedures    Assessment and Plan:        Dinesh Mccall is a 79-year-old male with an unknown PMH who presented to the [facility] ED after falling at home. Patient presented with lower extremity edema concerning for new onset CHF.  TTE performed 8/25 revealed global hypokinesis of the left ventrical and estimated EF of 15-20%.  There are additional concerns for upper GI bleeding with melena and a hgb drop from 7.2 to 6.6 following use of bactrim treatment for his cellulitis for which gastroenterology has been consulted.  Patient states that he continues to have dark watery stools following initiation of bactrim for treatment of lower extremity cellulitis.  His bowel movements are likely due to antibiotic use as hgb has remained stable. Iron deficiency labs may represent anemia of chronic disease.      #Melena   #Anemia   #Diarrhea   - hold on EGD until cardiac work up complete and cardiac function improve  - transfuse for hgb of <8  - add on transferrin to pre-transfusion labs   - continue to trend hgb daily  - consider adding Imodium to target diarrhea   - continue Protonix, 40mg BID      Patient seen and discussed with attending, Dr. Ricardo Reina.     Jame Hickman MD, PhD  Gastroenterology Fellow, PGY-6  [facility]  [place]   Division of Gastroenterology and Liver Disease       Thank you for the consultation. Gastroenterology will continue to the follow the patient.   Please do not hesitate to contact me on DocHalo or page [phone] if there are any further questions between the weekday hours of 7 AM - 5 PM.   If there is an urgent concern during the weekend, after-hours, or holidays; then please [place] [phone]. Thank you.    SIGNATURE: Jame Hickman MD PATIENT NAME: Dinesh Mccall   DATE: August 29, 2024 MRN: [id number]

## 2024-08-29 NOTE — PROGRESS NOTES
Dinesh Mccall is a 79 y.o. male on day 4 of admission presenting with Acute HFrEF (heart failure with reduced ejection fraction) (Multi).      Subjective   He is hemodynamically stable. His SOB is improving, without chest pain or palpitation. However, he had 3 bowel movement with melena. Did not have abdominal pain but is mildly distended. Yesterday he was given 1 unit of PRC transfusion without immediate transfusion reactions. There was no acute event overnight.     Objective     Last Recorded Vitals  /89   Pulse 92   Temp 36.5 °C (97.7 °F) (Temporal)   Resp 19   Wt 104 kg (230 lb)   SpO2 100%   Intake/Output last 3 Shifts:    Intake/Output Summary (Last 24 hours) at 8/29/2024 0843  Last data filed at 8/29/2024 0500  Gross per 24 hour   Intake 780.5 ml   Output 1300 ml   Net -519.5 ml       Admission Weight  Weight: 104 kg (230 lb) (08/25/24 0415)    Daily Weight  08/25/24 : 104 kg (230 lb)    Physical Exam  Constitutional:       General: He is not in acute distress.     Appearance: He is obese. He is ill-appearing.   HENT:      Head: Normocephalic.      Comments: Patient has laceration and dried blood at the top of his forehead to the right near hairline.      Nose: No rhinorrhea.   Eyes:      Extraocular Movements: Extraocular movements intact.      Conjunctiva/sclera: Conjunctivae normal.      Pupils: Pupils are equal, round, and reactive to light.   Cardiovascular:      Rate and Rhythm: Normal rate and regular rhythm.      Comments: Heart sounds are distant. Dorsalis pedis artery obtained via doppler.  Pulmonary:      Effort: Pulmonary effort is normal. No respiratory distress.      Breath sounds: No stridor. No wheezing or rhonchi.     Mildly decreased breath sound bilaterally at basal lung field.  Abdominal:      General: There is no distension.     Tenderness: There is no abdominal tenderness, no guarding.   Musculoskeletal:         General: Signs of injury present.      Cervical back:  Normal range of motion.      Limited evaluation of pedal pitting edema due to wound      dressing at both legs  Neurological:      General: No focal deficit present.      Mental Status: He is alert.      Comments: Oriented and without FND spontaneous to conversation.    Psychiatric:         Mood and Affect: Mood normal.         Behavior: Behavior normal.     Relevant Results  Results for orders placed or performed during the hospital encounter of 08/25/24 (from the past 24 hour(s))   Prepare RBC: 1 Units   Result Value Ref Range    PRODUCT CODE E1663A93     Unit Number [id number]     Unit ABO O     Unit RH NEG     XM INTEP COMP     Dispense Status TR     Blood Expiration Date 8/30/2024 11:59:00 PM EDT     PRODUCT BLOOD TYPE 9500     UNIT VOLUME 350    Type and screen   Result Value Ref Range    ABO TYPE O     Rh TYPE POS     ANTIBODY SCREEN NEG    CBC   Result Value Ref Range    WBC 12.6 (H) 4.4 - 11.3 x10*3/uL    nRBC 0.2 (H) 0.0 - 0.0 /100 WBCs    RBC 2.55 (L) 4.50 - 5.90 x10*6/uL    Hemoglobin 7.5 (L) 13.5 - 17.5 g/dL    Hematocrit 24.5 (L) 41.0 - 52.0 %    MCV 96 80 - 100 fL    MCH 29.4 26.0 - 34.0 pg    MCHC 30.6 (L) 32.0 - 36.0 g/dL    RDW 15.9 (H) 11.5 - 14.5 %    Platelets 265 150 - 450 x10*3/uL   Haptoglobin   Result Value Ref Range    Haptoglobin 167 30 - 200 mg/dL   Lactate dehydrogenase   Result Value Ref Range     84 - 246 U/L   CBC   Result Value Ref Range    WBC 11.8 (H) 4.4 - 11.3 x10*3/uL    nRBC 0.3 (H) 0.0 - 0.0 /100 WBCs    RBC 2.58 (L) 4.50 - 5.90 x10*6/uL    Hemoglobin 7.6 (L) 13.5 - 17.5 g/dL    Hematocrit 25.2 (L) 41.0 - 52.0 %    MCV 98 80 - 100 fL    MCH 29.5 26.0 - 34.0 pg    MCHC 30.2 (L) 32.0 - 36.0 g/dL    RDW 16.3 (H) 11.5 - 14.5 %    Platelets 239 150 - 450 x10*3/uL   Comprehensive metabolic panel   Result Value Ref Range    Glucose 127 (H) 74 - 99 mg/dL    Sodium 136 136 - 145 mmol/L    Potassium 3.4 (L) 3.5 - 5.3 mmol/L    Chloride 104 98 - 107 mmol/L    Bicarbonate 21  21 - 32 mmol/L    Anion Gap 14 10 - 20 mmol/L    Urea Nitrogen 59 (H) 6 - 23 mg/dL    Creatinine 2.41 (H) 0.50 - 1.30 mg/dL    eGFR 27 (L) >60 mL/min/1.73m*2    Calcium 7.5 (L) 8.6 - 10.6 mg/dL    Albumin 2.8 (L) 3.4 - 5.0 g/dL    Alkaline Phosphatase 135 33 - 136 U/L    Total Protein 5.5 (L) 6.4 - 8.2 g/dL    AST 19 9 - 39 U/L    Bilirubin, Total 0.7 0.0 - 1.2 mg/dL    ALT 21 10 - 52 U/L   Magnesium   Result Value Ref Range    Magnesium 2.06 1.60 - 2.40 mg/dL      Scheduled medications  [Held by provider] enoxaparin, 40 mg, subcutaneous, q24h  folic acid, 1 mg, oral, Daily  melatonin, 3 mg, oral, Daily  multivitamin with minerals, 1 tablet, oral, Daily  pantoprazole, 40 mg, intravenous, q12h CORNELIO  perflutren protein A microsphere, 0.5 mL, intravenous, Once in imaging  potassium chloride CR, 20 mEq, oral, Once  sulfamethoxazole-trimethoprim, 1 tablet, oral, q12h CORNELIO  sulfur hexafluoride microsphr, 2 mL, intravenous, Once in imaging  thiamine, 100 mg, oral, Daily      Continuous medications     PRN medications  PRN medications: loperamide     Assessment/Plan    Dinesh Mccall is a 79 year old man with unknown medical history presenting for fall with laceration in apparent acute decompensated heart failure of unclear etiology. He has severe bilateral LE edema with chronic skin changes and L dorsal mid foot cellulitis likely secondary to ulceration per CT read. Patient has signs via EMS, ED, primary team concerning for failure to thrive and social isolation. He is admitted for stabilization and work up of his cardiopulmonary status and cellulitis infection. He will need help from social work and likely rehab given long period of deconditioning.      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 No  history of chest pain or heart attack. Some concern for reliability given unclear social status. His history and presentation is consistent with acute decompensated heart failure of unknown etiology. BNP >2000.   Differential diagnosis of etiologies of HFrEF    - Ischemic CM, given his age group, family history of MI and heart failure, and his EKG of RBBB and 50 pack year smoking history     - Alcohol-induced CM? (History of drinking)    - Post-viral myocarditis?    - Amyloidosis?   Admission weight 230lb. A1C 6.6%; LDL-C 36. HIV and TSH WNL.   - Continuous Telemetry  - Strict I&O with daily weights  - Diuresis: bid 20 mg lasix IV: I/O negative ~1-1.5L/day *3 days    lungs - less congestion, symptoms partially improved  - TTE 8/26/2024: LVEF 15-20%, global hypokinesia of LV, elevated mean LAP, moderate MR, moderate/severe TR, moderately enlarged RV, mild/moderately elevated PAP  Plan   - continue O2 cannula 2 LPM, keep SpO2 >94%  - Start iv lasix today after EGD  - Cardiology consulted --> suggest hydralazine & isosorbide dinitrate for HFrEF (will start after EGD if no hypotension)  - Scheduled for cardiac stress MRI to evaluate cardiac function and ischemic eval (pending)  - plan to start GDMT if stable serum Cr + euvolemia achieved     # Left lower extremity infection, likely cellulitis per CT  Patient has LE edema bilaterally with more redness on the left. No pus noted. XR L Foot was suspicious for soft tissue gases. There is an ulceration on the anterior ankle but no open wound or evidence of pus. Culture were not taken prior to starting antibiotics. Follow up CT negative for deep space infection or osteo.   Antibiotics    - Vanc + Zosyn 8/25/2024 --> Vanc + ceftriaxone 8/25-8/26  Switched to oral antibiotics of bactrim 800 mg bid (since 8/26/24 - now). U/S Doppler DVT: no evidence of acute DVT both legs   Plan  - continue Bactrim (800) 2 tabs BID plan 5 days in total (d.4)     # Mechanical Post fall   # Upper  GI bleeding (non-variceal; suspected ulcer-related disease from chronic ibuprofen (advil) use) with anemia (Hb 7.2-7.3 --> 6.6)    - Ddx could have ACD/EMERY or anemia of CKD  CT Head negative. Patient complaining of R hip swelling this afternoon. Denies pain, but hyperventilated on palpation. Hemoglobin 8.9 AM-> 7.2 PM -> 7.3 on 8/26.   :: On 8/28 - notified of new episode of melena this morning.   - T&S in process   - s/p 1 stitch for forehead laceration notable blood loss  - F/u Right Hip XR - severe osteoporosis of both hip (R>L)  - Serum iron 28, %saturation 8%, TIBC 353, ferritin 40   - corrected reticulocyte count 2.2%, index 1.15 (Ret-Hb 23 - low)     --> likely hypoproliferative anemia  Plan  - F/U CBC daily, keep Hb >7 g/dL  - transfuse PRC 1 Unit --> Hb 7.5-7.6 g/dL  - start IV pantoprazole 80 mg IV stat then 40 mg IV bid  - GI plan to do EGD under anesthesia today     # KIA vs CKD (Cr 2.2, no prior Cr level)  # Mild hypokalemia due to diuretics  Possibly pre-renal related to ADHF presentation. High protein in urine, consider nephrotic syndrome, intrinsic injury in context of ongoing ibuprofen use. Given poor cardiac function, cardiorenal syndrome could also contribute to his serum Cr rising. F/U Cr 2.2 -> 2.59 -> 2.48 -> 2.44 -> 2.57 -> 2.41  - urine elytes: urine Na <10, urine Cr 82.3, Urine K/Cr 60  Plan  - Continue diuresis until euvolemia achieved  - Avoid nephrotoxic medications  - Monitoring of RFP daily to twice daily  - correct hypokalemia (keep K >3.5-4 due to long QTc)     # Alcohol use  Patient mentioned multiple shots a day to help him sleep, later said one one shot with soda. He endorses drinking to drowsiness every night. Elevated alk phos.  - CIWA protocol discontinued on 8/27/2024     # Insomnia  - on Melatonin 3 mg po at night    # Watery diarrhea (8/28/2024) - watery, no pus/mucus/blood  - no signs of abdominal pain, N/V  - Differential diagnosis: drug-induced (bactrim, furosemide)     #  Disposition: patients prefer [facility], has PCP  Patient likely not safe to go home for now. Will need social work and APS check on his house.     P: Be careful given ADHF  E: PRN  N: NPO pending need for procedure  GI PPX: Pantoprazole IV for treatment of UGIB  DVT PPX: Lovenox held due to UGIB  ABX: Bactrim  NOK: Skyla Kincaid (Sister) [phone] or [phone]      Ziggy Webb MD  PGY-1, Internal Medicine/Medical Genetics

## 2024-08-29 NOTE — PROGRESS NOTES
Occupational Therapy    Evaluation/Treatment    Patient Name: Dinesh Mccall  MRN: [id number]  Today's Date: 8/29/2024  Time Calculation  Start Time: 1441  Stop Time: 1505  Time Calculation (min): 24 min    Assessment  IP OT Assessment  OT Assessment: Impaired ADLs, bed mobility, and transfers  Prognosis: Good  Barriers to Discharge: None  Evaluation/Treatment Tolerance: Patient tolerated treatment well  Medical Staff Made Aware: Yes  End of Session Communication: Bedside nurse  End of Session Patient Position: Bed, 3 rail up, Alarm off, not on at start of session  Plan:  Treatment Interventions: ADL retraining, Functional transfer training, Endurance training  OT Frequency: 3 times per week  OT Discharge Recommendations: Moderate intensity level of continued care  OT Recommended Transfer Status: Maximum assist, Assist of 1  OT - OK to Discharge: Yes    Subjective     Current Problem:  1. Acute decompensated heart failure (Multi)        2. Fall, initial encounter        3. Multiple open wounds of lower leg, unspecified laterality, initial encounter        4. SOB (shortness of breath)  Transthoracic Echo (TTE) Complete    Transthoracic Echo (TTE) Complete      5. Localized edema  Vascular US lower extremity venous duplex bilateral      6. Melena  Esophagogastroduodenoscopy (EGD)    Esophagogastroduodenoscopy (EGD)      7. ABLA (acute blood loss anemia)  Esophagogastroduodenoscopy (EGD)    Esophagogastroduodenoscopy (EGD)          General:  Reason for Referral: Acute Decompensated Heart Failure, unclear etiology with Right Bundle Branch Block, bilateral pleural effusion, bilateral LE edema - HF with reduced EF  Past Medical History Relevant to Rehab: Anemia  Co-Treatment: PT  Prior to Session Communication: Bedside nurse  Patient Position Received: Bed, 3 rail up     Precautions:  Medical Precautions: Fall precautions    Pain:  Pain Assessment  Pain Assessment: 0-10  0-10 (Numeric) Pain Score: 0 - No  pain      Objective   Cognition:  Overall Cognitive Status: Within Functional Limits  Orientation Level: Oriented X4             Home Living:  Type of Home: Apartment  Lives With: Alone  Home Layout: One level  Home Access: Elevator  Home Living Comments: Per EMR, poor living environment     Prior Function:  Level of [place]: Independent with ADLs and functional transfers, Independent with homemaking with ambulation  ADL Assistance:  (has been sponge bathing, using his office chair as a wheelchair for mobility.)  Ambulatory Assistance:  (limited ambulation for transfers, or to go out to his car, however, uses an office chair for mobility.)  Prior Function Comments: + fall, + drive    ADL:  Eating Assistance: Stand by  Eating Deficit: Setup  Grooming Assistance: Stand by (anticipate)  LE Dressing Assistance: Maximal  LE Dressing Deficit: Don/doff R sock, Don/doff L sock (anticipate)    Activity Tolerance:  Endurance: Decreased tolerance for upright activites    Balance:  Dynamic Sitting Balance  Dynamic Sitting-Balance Support: Bilateral upper extremity supported  Dynamic Sitting-Level of Assistance: Contact guard  Dynamic Standing Balance  Dynamic Standing-Balance Support: Bilateral upper extremity supported  Dynamic Standing-Level of Assistance: Minimum assistance  Dynamic Standing-Comments: x 1  Static Sitting Balance  Static Sitting-Balance Support: No upper extremity supported  Static Sitting-Level of Assistance: Close supervision  Static Standing Balance  Static Standing-Balance Support: Bilateral upper extremity supported  Static Standing-Level of Assistance: Minimum assistance  Static Standing-Comment/Number of Minutes: x1    Bed Mobility/Transfers: Bed Mobility  Bed Mobility: Yes  Bed Mobility 1  Bed Mobility 1: Supine to sitting  Level of Assistance 1: Minimum assistance  Bed Mobility Comments 1: HOB elevated  Bed Mobility 2  Bed Mobility  2: Sitting to supine  Level of Assistance 2: Maximum  assistance  Bed Mobility Comments 2: to assist with B LE   and Transfers  Transfer: Yes  Transfer 1  Transfer From 1: Bed to  Transfer to 1: Stand  Technique 1: Sit to stand  Transfer Device 1: Walker  Transfer Level of Assistance 1: Moderate assistance, Minimal verbal cues, +2  Trials/Comments 1: from an elevated surface    Vision: Vision - Basic Assessment  Current Vision: No visual deficits   and Vision - Complex Assessment  Ocular Range of Motion: Within Functional Limits    Sensation:  Light Touch: No apparent deficits    Coordination:  Movements are Fluid and Coordinated: Yes     Hand Function:  Hand Function  Gross Grasp: Functional  Coordination: Functional    Extremities: RUE   RUE : Within Functional Limits, LUE   LUE: Within Functional Limits,  , and      Outcome Measures: [facility] Daily Activity  Putting on and taking off regular lower body clothing: A lot  Bathing (including washing, rinsing, drying): A lot  Putting on and taking off regular upper body clothing: A little  Toileting, which includes using toilet, bedpan or urinal: Total  Taking care of personal grooming such as brushing teeth: A little  Eating Meals: None  Daily Activity - Total Score: 15  OT Adult Other Outcome Measures  4AT: negative    Education Documentation  Body Mechanics, taught by Sae Noyola OT at 8/29/2024  3:36 PM.  Learner: Patient  Readiness: Acceptance  Method: Explanation  Response: Verbalizes Understanding    ADL Training, taught by Sae Noyola OT at 8/29/2024  3:36 PM.  Learner: Patient  Readiness: Acceptance  Method: Explanation  Response: Verbalizes Understanding    Education Comments  No comments found.        Goals:   Encounter Problems       Encounter Problems (Active)       ADLs       Patient will perform UB and LB bathing  with minimal assist  level of assistance and grab bars. (Progressing)       Start:  08/29/24    Expected End:  09/19/24            Patient with complete lower body dressing with minimal  assist  level of assistance donning and doffing all LE clothes  with PRN adaptive equipment while edge of bed  (Progressing)       Start:  08/29/24    Expected End:  09/19/24            Patient will complete toileting including hygiene clothing management/hygiene with minimal assist  level of assistance and grab bars. (Progressing)       Start:  08/29/24    Expected End:  09/19/24               BALANCE       Pt will maintain dynamic standing balance during ADL task with minimal assist  level of assistance in order to demonstrate decreased risk of falling and improved postural control. (Progressing)       Start:  08/29/24    Expected End:  09/19/24               EXERCISE/STRENGTHENING       Patient will complete BUE exercises for 10 reps in order to improve strength and activity for ADL performance.  (Progressing)       Start:  08/29/24    Expected End:  09/19/24               MOBILITY       Patient will perform Functional mobility min Household distances/Community Distances with minimal assist  level of assistance and least restrictive device in order to improve safety and functional mobility. (Progressing)       Start:  08/29/24    Expected End:  09/19/24               TRANSFERS       Patient will perform bed mobility minimal assist  level of assistance and bed rails in order to improve safety and independence with mobility (Progressing)       Start:  08/29/24    Expected End:  09/19/24            Patient will complete sit to stand transfer with minimal assist  level of assistance and least restrictive device in order to improve safety and prepare for out of bed mobility. (Progressing)       Start:  08/29/24    Expected End:  09/19/24                   Treatment Completed on Evaluation    Activities of Daily Living:      Toileting  Toileting Level of Assistance: Dependent  Where Assessed:  (in standing)           08/29/24 at 3:37 PM   Sae HESTER/L, OTADAM  Rehab Office: [phone]

## 2024-08-29 NOTE — CARE PLAN
The patient's goals for the shift include      The clinical goals for the shift include Patient will remain HDS throughout shift    Problem: Skin  Goal: Decreased wound size/increased tissue granulation at next dressing change  Outcome: Progressing  Flowsheets (Taken 8/28/2024 1514 by Kierra Glover RN)  Decreased wound size/increased tissue granulation at next dressing change:   Promote sleep for wound healing   Protective dressings over bony prominences  Goal: Participates in plan/prevention/treatment measures  Outcome: Progressing  Flowsheets (Taken 8/29/2024 0045)  Participates in plan/prevention/treatment measures: Elevate heels  Goal: Prevent/manage excess moisture  Outcome: Progressing  Flowsheets (Taken 8/28/2024 1514 by Kierra Glover, CIARA)  Prevent/manage excess moisture:   Cleanse incontinence/protect with barrier cream   Follow provider orders for dressing changes   Moisturize dry skin  Goal: Prevent/minimize sheer/friction injuries  Outcome: Progressing  Flowsheets (Taken 8/28/2024 1514 by Kierra Glover RN)  Prevent/minimize sheer/friction injuries:   Complete micro-shifts as needed if patient unable. Adjust patient position to relieve pressure points, not a full turn   Use pull sheet   Utilize specialty bed per algorithm  Goal: Promote/optimize nutrition  Outcome: Progressing  Flowsheets (Taken 8/28/2024 1514 by Kierra Glover RN)  Promote/optimize nutrition: Monitor/record intake including meals  Goal: Promote skin healing  Outcome: Progressing  Flowsheets (Taken 8/28/2024 1514 by Kierra Glover RN)  Promote skin healing:   Assess skin/pad under line(s)/device(s)   Protective dressings over bony prominences

## 2024-08-29 NOTE — PROGRESS NOTES
Physical Therapy    Physical Therapy Evaluation & Treatment    Patient Name: Dinesh Mccall  MRN: [id number]  Today's Date: 8/29/2024   Time Calculation  Start Time: 1441  Stop Time: 1505  Time Calculation (min): 24 min    Assessment/Plan   PT Assessment  PT Assessment Results: Decreased endurance, Decreased mobility, Impaired balance  Rehab Prognosis: Good  Medical Staff Made Aware: Yes  End of Session Communication: Bedside nurse  Assessment Comment: Patient is a 80yo M presenting from home with fall and failure to thrive. Patient was using a rolling office chair to mobilize within the home and sponge bathing. Patinet demonstrates decreased functional mobility at this time and decreased awareness of deficits. dc rec for mod intensity. pt is a high risk for falls and fatigues quickly.  End of Session Patient Position: Bed, 3 rail up, Alarm off, not on at start of session   IP OR SWING BED PT PLAN  Inpatient or Swing Bed: Inpatient  PT Plan  Treatment/Interventions: Bed mobility, Transfer training, Gait training, Balance training, Neuromuscular re-education, Strengthening, Endurance training, Range of motion, Therapeutic exercise, Therapeutic activity  PT Plan: Ongoing PT  PT Frequency: 3 times per week  PT Discharge Recommendations: Moderate intensity level of continued care  PT Recommended Transfer Status: Assistive device, Assist x2  PT - OK to Discharge: Yes (indicates PT eval complete and dc rec determined)      Subjective     General Visit Information:  General  Reason for Referral: fall, failure to thrive (Simultaneous filing. User may not have seen previous data.)  Past Medical History Relevant to Rehab: Acute HFrEF  Co-Treatment: OT  Co-Treatment Reason: maximize pt safety and function  Prior to Session Communication: Bedside nurse  Patient Position Received: Bed, 3 rail up  General Comment: pt supine in bed, RN cleared. cooperative but poor realization of current mobility and convinced his LEs are an  allergic reaction to ramalvin noodles  Home Living:  Home Living  Type of Home: Apartment  Lives With: Alone  Home Adaptive Equipment:  (reports his family member has a wheelchair and FWW he can borrow)  Home Layout: One level  Home Access: Elevator (elevator from parking garage to 1st floor apartment)  Home Living Comments: per EMR, poor living environment  Prior Level of Function:  Prior Function Per Pt/Caregiver Report  Level of [place]: Independent with ADLs and functional transfers, Independent with homemaking with ambulation  ADL Assistance:  (pt has been sponge bathing with wet wipes.)  Homemaking Assistance:  (per EMR, living conditions are poor)  Ambulatory Assistance:  (pt has been using a rolling office chair as primary mobility since end of march. pt also reports he could ambulate short distances furniture walking)  Prior Function Comments: + fall, + drive  Precautions:  Precautions  Medical Precautions: Fall precautions    Vital Signs Comment: on arrival to room NC not in nose, pt satting at 73%. placed NC back in nose on 4L and returned to 91%    Objective   Pain:  Pain Assessment  Pain Assessment: 0-10  0-10 (Numeric) Pain Score: 0 - No pain  Cognition:  Cognition  Overall Cognitive Status: Within Functional Limits  Orientation Level: Oriented X4    General Assessments:     Activity Tolerance  Endurance: Tolerates 10 - 20 min exercise with multiple rests    Sensation  Light Touch:  (reports N/T in bilateral feet)    Static Sitting Balance  Static Sitting-Level of Assistance: Close supervision  Static Sitting-Comment/Number of Minutes: 5  Functional Assessments:  Bed Mobility  Bed Mobility: Yes  Bed Mobility 1  Bed Mobility 1: Supine to sitting  Level of Assistance 1: Minimum assistance (x2)  Bed Mobility Comments 1: HOB elevated  Bed Mobility 2  Bed Mobility  2: Sitting to supine  Level of Assistance 2: Maximum assistance  Bed Mobility Comments 2: assist with BLEs    Transfers  Transfer:  Yes  Transfer 1  Transfer From 1: Sit to, Stand to  Transfer to 1: Stand, Sit  Technique 1: Sit to stand, Stand to sit  Transfer Device 1: Walker  Transfer Level of Assistance 1: Moderate assistance (x2)  Trials/Comments 1: stood from EOB, cues for hand placement. bed height elevated    Ambulation/Gait Training  Ambulation/Gait Training Performed: Yes  Ambulation/Gait Training 1  Surface 1: Level tile  Device 1: Rolling walker  Assistance 1: Minimum assistance (x2)  Quality of Gait 1: Shuffling gait, Decreased step length  Comments/Distance (ft) 1: pt ambulated lateral steps along EOB 2' with repeated cues to complete and easily distracted  Extremity/Trunk Assessments:  RLE   RLE :  (3/5)  LLE   LLE :  (3/5)  Treatments:  Therapeutic Activity  Therapeutic Activity Performed: Yes  Therapeutic Activity 1: increased time to complete mobility. pt completed lateral steps along EOB, static stood for 2 min for clean up after BM. Patient seated rest and reports fatigue. returned to supine. monitored vitals throughout  Outcome Measures:  [facility] Basic Mobility  Turning from your back to your side while in a flat bed without using bedrails: A lot  Moving from lying on your back to sitting on the side of a flat bed without using bedrails: A lot  Moving to and from bed to chair (including a wheelchair): A lot  Standing up from a chair using your arms (e.g. wheelchair or bedside chair): A lot  To walk in hospital room: A lot  Climbing 3-5 steps with railing: Total  Basic Mobility - Total Score: 11    Encounter Problems       Encounter Problems (Active)       Balance       tinetti score > 24 to indicate low risk for falls  (Progressing)       Start:  08/29/24    Expected End:  09/20/24               Mobility       STG - Patient will ambulate > 50' with LRAD modif indep  (Progressing)       Start:  08/29/24    Expected End:  09/20/24               PT Transfers       STG - Patient will perform bed mobility indep  (Progressing)        Start:  08/29/24    Expected End:  09/20/24            STG - Patient will transfer sit to and from stand LRAD modif indep  (Progressing)       Start:  08/29/24    Expected End:  09/20/24               Pain - Adult              Education Documentation  Precautions, taught by Concepcion Irvin PT at 8/29/2024  3:26 PM.  Learner: Patient  Readiness: Acceptance  Method: Explanation  Response: Needs Reinforcement  Comment: edu on role of PT, dc plan and safety    Mobility Training, taught by Concepcion Irvin PT at 8/29/2024  3:26 PM.  Learner: Patient  Readiness: Acceptance  Method: Explanation  Response: Needs Reinforcement  Comment: edu on role of PT, dc plan and safety    Education Comments  No comments found.

## 2024-08-30 LAB
ALBUMIN SERPL BCP-MCNC: 2.8 G/DL (ref 3.4–5)
ALBUMIN SERPL BCP-MCNC: 3 G/DL (ref 3.4–5)
ALP SERPL-CCNC: 118 U/L (ref 33–136)
ALT SERPL W P-5'-P-CCNC: 21 U/L (ref 10–52)
ANION GAP SERPL CALC-SCNC: 14 MMOL/L (ref 10–20)
ANION GAP SERPL CALC-SCNC: 15 MMOL/L (ref 10–20)
AST SERPL W P-5'-P-CCNC: 20 U/L (ref 9–39)
BILIRUB SERPL-MCNC: 0.9 MG/DL (ref 0–1.2)
BUN SERPL-MCNC: 51 MG/DL (ref 6–23)
BUN SERPL-MCNC: 55 MG/DL (ref 6–23)
CALCIUM SERPL-MCNC: 7.6 MG/DL (ref 8.6–10.6)
CALCIUM SERPL-MCNC: 7.8 MG/DL (ref 8.6–10.6)
CHLORIDE SERPL-SCNC: 102 MMOL/L (ref 98–107)
CHLORIDE SERPL-SCNC: 104 MMOL/L (ref 98–107)
CO2 SERPL-SCNC: 24 MMOL/L (ref 21–32)
CO2 SERPL-SCNC: 24 MMOL/L (ref 21–32)
CREAT SERPL-MCNC: 2.7 MG/DL (ref 0.5–1.3)
CREAT SERPL-MCNC: 2.76 MG/DL (ref 0.5–1.3)
EGFRCR SERPLBLD CKD-EPI 2021: 23 ML/MIN/1.73M*2
EGFRCR SERPLBLD CKD-EPI 2021: 23 ML/MIN/1.73M*2
ERYTHROCYTE [DISTWIDTH] IN BLOOD BY AUTOMATED COUNT: 15.9 % (ref 11.5–14.5)
GLUCOSE SERPL-MCNC: 106 MG/DL (ref 74–99)
GLUCOSE SERPL-MCNC: 119 MG/DL (ref 74–99)
HCT VFR BLD AUTO: 22 % (ref 41–52)
HGB BLD-MCNC: 7 G/DL (ref 13.5–17.5)
MAGNESIUM SERPL-MCNC: 1.94 MG/DL (ref 1.6–2.4)
MAGNESIUM SERPL-MCNC: 2.05 MG/DL (ref 1.6–2.4)
MCH RBC QN AUTO: 28.6 PG (ref 26–34)
MCHC RBC AUTO-ENTMCNC: 31.8 G/DL (ref 32–36)
MCV RBC AUTO: 90 FL (ref 80–100)
NRBC BLD-RTO: 0.1 /100 WBCS (ref 0–0)
PHOSPHATE SERPL-MCNC: 4.1 MG/DL (ref 2.5–4.9)
PLATELET # BLD AUTO: 247 X10*3/UL (ref 150–450)
POTASSIUM SERPL-SCNC: 3.4 MMOL/L (ref 3.5–5.3)
POTASSIUM SERPL-SCNC: 3.8 MMOL/L (ref 3.5–5.3)
PROT SERPL-MCNC: 5.9 G/DL (ref 6.4–8.2)
RBC # BLD AUTO: 2.45 X10*6/UL (ref 4.5–5.9)
SODIUM SERPL-SCNC: 138 MMOL/L (ref 136–145)
SODIUM SERPL-SCNC: 138 MMOL/L (ref 136–145)
TRANSFERRIN SERPL-MCNC: 263 MG/DL (ref 200–360)
WBC # BLD AUTO: 14 X10*3/UL (ref 4.4–11.3)

## 2024-08-30 PROCEDURE — 99233 SBSQ HOSP IP/OBS HIGH 50: CPT

## 2024-08-30 PROCEDURE — 2500000001 HC RX 250 WO HCPCS SELF ADMINISTERED DRUGS (ALT 637 FOR MEDICARE OP)

## 2024-08-30 PROCEDURE — 85027 COMPLETE CBC AUTOMATED: CPT

## 2024-08-30 PROCEDURE — 80069 RENAL FUNCTION PANEL: CPT | Mod: CCI

## 2024-08-30 PROCEDURE — 2500000002 HC RX 250 W HCPCS SELF ADMINISTERED DRUGS (ALT 637 FOR MEDICARE OP, ALT 636 FOR OP/ED)

## 2024-08-30 PROCEDURE — 97116 GAIT TRAINING THERAPY: CPT | Mod: GP,CQ

## 2024-08-30 PROCEDURE — 97530 THERAPEUTIC ACTIVITIES: CPT | Mod: GP,CQ

## 2024-08-30 PROCEDURE — 83735 ASSAY OF MAGNESIUM: CPT | Performed by: STUDENT IN AN ORGANIZED HEALTH CARE EDUCATION/TRAINING PROGRAM

## 2024-08-30 PROCEDURE — 83735 ASSAY OF MAGNESIUM: CPT

## 2024-08-30 PROCEDURE — 80053 COMPREHEN METABOLIC PANEL: CPT

## 2024-08-30 PROCEDURE — 2500000004 HC RX 250 GENERAL PHARMACY W/ HCPCS (ALT 636 FOR OP/ED)

## 2024-08-30 PROCEDURE — 99233 SBSQ HOSP IP/OBS HIGH 50: CPT | Performed by: NURSE PRACTITIONER

## 2024-08-30 PROCEDURE — 36415 COLL VENOUS BLD VENIPUNCTURE: CPT | Performed by: STUDENT IN AN ORGANIZED HEALTH CARE EDUCATION/TRAINING PROGRAM

## 2024-08-30 PROCEDURE — 36415 COLL VENOUS BLD VENIPUNCTURE: CPT

## 2024-08-30 PROCEDURE — 1200000002 HC GENERAL ROOM WITH TELEMETRY DAILY

## 2024-08-30 PROCEDURE — 99232 SBSQ HOSP IP/OBS MODERATE 35: CPT | Performed by: STUDENT IN AN ORGANIZED HEALTH CARE EDUCATION/TRAINING PROGRAM

## 2024-08-30 RX ORDER — MAGNESIUM SULFATE HEPTAHYDRATE 40 MG/ML
2 INJECTION, SOLUTION INTRAVENOUS ONCE
Status: COMPLETED | OUTPATIENT
Start: 2024-08-30 | End: 2024-08-31

## 2024-08-30 RX ORDER — POTASSIUM CHLORIDE 20 MEQ/1
10 TABLET, EXTENDED RELEASE ORAL ONCE
Status: COMPLETED | OUTPATIENT
Start: 2024-08-30 | End: 2024-08-30

## 2024-08-30 RX ORDER — POTASSIUM CHLORIDE 1.5 G/1.58G
40 POWDER, FOR SOLUTION ORAL ONCE
Status: DISCONTINUED | OUTPATIENT
Start: 2024-08-30 | End: 2024-08-30

## 2024-08-30 RX ORDER — FUROSEMIDE 10 MG/ML
80 INJECTION INTRAMUSCULAR; INTRAVENOUS ONCE
Status: COMPLETED | OUTPATIENT
Start: 2024-08-30 | End: 2024-08-30

## 2024-08-30 RX ORDER — FUROSEMIDE 10 MG/ML
10 INJECTION INTRAMUSCULAR; INTRAVENOUS CONTINUOUS
Status: DISCONTINUED | OUTPATIENT
Start: 2024-08-30 | End: 2024-08-30

## 2024-08-30 RX ORDER — FUROSEMIDE 10 MG/ML
20 INJECTION INTRAMUSCULAR; INTRAVENOUS EVERY 8 HOURS
Status: DISCONTINUED | OUTPATIENT
Start: 2024-08-30 | End: 2024-08-30

## 2024-08-30 RX ORDER — POTASSIUM CHLORIDE 1.5 G/1.58G
20 POWDER, FOR SOLUTION ORAL ONCE
Status: COMPLETED | OUTPATIENT
Start: 2024-08-30 | End: 2024-08-30

## 2024-08-30 RX ORDER — POTASSIUM CHLORIDE 1.5 G/1.58G
40 POWDER, FOR SOLUTION ORAL ONCE
Status: COMPLETED | OUTPATIENT
Start: 2024-08-30 | End: 2024-08-30

## 2024-08-30 RX ADMIN — ISOSORBIDE DINITRATE 10 MG: 10 TABLET ORAL at 18:57

## 2024-08-30 RX ADMIN — POTASSIUM CHLORIDE 10 MEQ: 1500 TABLET, EXTENDED RELEASE ORAL at 14:19

## 2024-08-30 RX ADMIN — FUROSEMIDE 80 MG: 10 INJECTION, SOLUTION INTRAMUSCULAR; INTRAVENOUS at 14:19

## 2024-08-30 RX ADMIN — Medication 1 TABLET: at 09:32

## 2024-08-30 RX ADMIN — FUROSEMIDE 10 MG/HR: 10 INJECTION, SOLUTION INTRAMUSCULAR; INTRAVENOUS at 15:36

## 2024-08-30 RX ADMIN — ISOSORBIDE DINITRATE 10 MG: 10 TABLET ORAL at 14:19

## 2024-08-30 RX ADMIN — MAGNESIUM SULFATE HEPTAHYDRATE 2 G: 40 INJECTION, SOLUTION INTRAVENOUS at 23:49

## 2024-08-30 RX ADMIN — HYDRALAZINE HYDROCHLORIDE 10 MG: 10 TABLET ORAL at 14:19

## 2024-08-30 RX ADMIN — ISOSORBIDE DINITRATE 10 MG: 10 TABLET ORAL at 09:32

## 2024-08-30 RX ADMIN — PANTOPRAZOLE SODIUM 40 MG: 40 INJECTION, POWDER, FOR SOLUTION INTRAVENOUS at 09:31

## 2024-08-30 RX ADMIN — SULFAMETHOXAZOLE AND TRIMETHOPRIM 1 TABLET: 800; 160 TABLET ORAL at 09:32

## 2024-08-30 RX ADMIN — FUROSEMIDE 20 MG: 10 INJECTION, SOLUTION INTRAVENOUS at 09:31

## 2024-08-30 RX ADMIN — POTASSIUM CHLORIDE 40 MEQ: 1.5 POWDER, FOR SOLUTION ORAL at 23:48

## 2024-08-30 RX ADMIN — HYDRALAZINE HYDROCHLORIDE 10 MG: 10 TABLET ORAL at 09:32

## 2024-08-30 RX ADMIN — PANTOPRAZOLE SODIUM 40 MG: 40 INJECTION, POWDER, FOR SOLUTION INTRAVENOUS at 20:58

## 2024-08-30 RX ADMIN — SULFAMETHOXAZOLE AND TRIMETHOPRIM 1 TABLET: 800; 160 TABLET ORAL at 20:58

## 2024-08-30 RX ADMIN — POTASSIUM CHLORIDE 20 MEQ: 1.5 POWDER, FOR SOLUTION ORAL at 03:15

## 2024-08-30 RX ADMIN — Medication 3 MG: at 18:57

## 2024-08-30 RX ADMIN — HYDRALAZINE HYDROCHLORIDE 10 MG: 10 TABLET ORAL at 20:58

## 2024-08-30 RX ADMIN — FOLIC ACID 1 MG: 1 TABLET ORAL at 09:32

## 2024-08-30 RX ADMIN — THIAMINE HCL TAB 100 MG 100 MG: 100 TAB at 09:32

## 2024-08-30 ASSESSMENT — COGNITIVE AND FUNCTIONAL STATUS - GENERAL
DAILY ACTIVITIY SCORE: 17
CLIMB 3 TO 5 STEPS WITH RAILING: TOTAL
CLIMB 3 TO 5 STEPS WITH RAILING: TOTAL
STANDING UP FROM CHAIR USING ARMS: A LOT
TURNING FROM BACK TO SIDE WHILE IN FLAT BAD: A LOT
DRESSING REGULAR LOWER BODY CLOTHING: A LOT
WALKING IN HOSPITAL ROOM: A LOT
MOBILITY SCORE: 12
PERSONAL GROOMING: A LITTLE
MOVING TO AND FROM BED TO CHAIR: A LOT
DRESSING REGULAR UPPER BODY CLOTHING: A LITTLE
TOILETING: A LITTLE
TURNING FROM BACK TO SIDE WHILE IN FLAT BAD: A LITTLE
WALKING IN HOSPITAL ROOM: A LOT
MOVING TO AND FROM BED TO CHAIR: A LITTLE
HELP NEEDED FOR BATHING: A LOT
STANDING UP FROM CHAIR USING ARMS: A LOT
MOVING FROM LYING ON BACK TO SITTING ON SIDE OF FLAT BED WITH BEDRAILS: A LITTLE
MOBILITY SCORE: 15

## 2024-08-30 ASSESSMENT — PAIN - FUNCTIONAL ASSESSMENT
PAIN_FUNCTIONAL_ASSESSMENT: 0-10
PAIN_FUNCTIONAL_ASSESSMENT: 0-10

## 2024-08-30 ASSESSMENT — PAIN SCALES - GENERAL
PAINLEVEL_OUTOF10: 0 - NO PAIN

## 2024-08-30 NOTE — PROGRESS NOTES
Dinesh Mccall is a 79 y.o. male on day 5 of admission presenting with Acute HFrEF (heart failure with reduced ejection fraction) (Multi).      Subjective   He is hemodynamically stable. His SOB and overall fatigue still ongoing, without chest pain or palpitation. His bowel movement slowly improved. Less melena yesterday. Felt weak. No acute event overnight. Accept rehab facility after discharge.    Objective     Last Recorded Vitals  /76   Pulse 92   Temp 37 °C (98.6 °F)   Resp 18   Wt 104 kg (230 lb)   SpO2 95%   Intake/Output last 3 Shifts:    Intake/Output Summary (Last 24 hours) at 8/30/2024 1203  Last data filed at 8/30/2024 0600  Gross per 24 hour   Intake 720 ml   Output 1900 ml   Net -1180 ml       Admission Weight  Weight: 104 kg (230 lb) (08/25/24 0415)    Daily Weight  08/25/24 : 104 kg (230 lb)    Physical Exam  Constitutional:       General: He is not in acute distress.     Appearance: He is obese. He is ill-appearing.   HENT:      Head: Normocephalic.      Comments: Patient has laceration and dried blood at the top of his forehead to the right near hairline.      Nose: No rhinorrhea.   Eyes:      Extraocular Movements: Extraocular movements intact.      Conjunctiva/sclera: Conjunctivae normal.      Pupils: Pupils are equal, round, and reactive to light.   Cardiovascular:      Rate and Rhythm: Normal rate and regular rhythm.      Comments: Heart sounds are distant. Dorsalis pedis artery obtained via doppler.  Pulmonary:      Effort: Pulmonary effort is normal. No respiratory distress.      Breath sounds: No stridor. No wheezing or rhonchi. Crackles present     Mildly decreased breath sound bilaterally at basal lung field.  Abdominal:      General: There is moderate distension.     Tenderness: There is no abdominal tenderness, no guarding.   Musculoskeletal:         General: Signs of injury present.      Cervical back: Normal range of motion.      Limited evaluation of pedal pitting  edema due to wound      dressing at both legs  Neurological:      General: No focal deficit present.      Mental Status: He is alert.      Comments: Oriented and without FND spontaneous to conversation.    Psychiatric:         Mood and Affect: Mood normal.         Behavior: Behavior normal.     Relevant Results  Results for orders placed or performed during the hospital encounter of 08/25/24 (from the past 24 hour(s))   Renal Function Panel   Result Value Ref Range    Glucose 122 (H) 74 - 99 mg/dL    Sodium 138 136 - 145 mmol/L    Potassium 3.5 3.5 - 5.3 mmol/L    Chloride 103 98 - 107 mmol/L    Bicarbonate 21 21 - 32 mmol/L    Anion Gap 18 10 - 20 mmol/L    Urea Nitrogen 53 (H) 6 - 23 mg/dL    Creatinine 2.49 (H) 0.50 - 1.30 mg/dL    eGFR 26 (L) >60 mL/min/1.73m*2    Calcium 7.7 (L) 8.6 - 10.6 mg/dL    Phosphorus 4.0 2.5 - 4.9 mg/dL    Albumin 2.8 (L) 3.4 - 5.0 g/dL   CBC   Result Value Ref Range    WBC 13.0 (H) 4.4 - 11.3 x10*3/uL    nRBC 0.3 (H) 0.0 - 0.0 /100 WBCs    RBC 2.56 (L) 4.50 - 5.90 x10*6/uL    Hemoglobin 7.5 (L) 13.5 - 17.5 g/dL    Hematocrit 25.0 (L) 41.0 - 52.0 %    MCV 98 80 - 100 fL    MCH 29.3 26.0 - 34.0 pg    MCHC 30.0 (L) 32.0 - 36.0 g/dL    RDW 16.3 (H) 11.5 - 14.5 %    Platelets 258 150 - 450 x10*3/uL   CBC   Result Value Ref Range    WBC 14.0 (H) 4.4 - 11.3 x10*3/uL    nRBC 0.1 (H) 0.0 - 0.0 /100 WBCs    RBC 2.45 (L) 4.50 - 5.90 x10*6/uL    Hemoglobin 7.0 (L) 13.5 - 17.5 g/dL    Hematocrit 22.0 (L) 41.0 - 52.0 %    MCV 90 80 - 100 fL    MCH 28.6 26.0 - 34.0 pg    MCHC 31.8 (L) 32.0 - 36.0 g/dL    RDW 15.9 (H) 11.5 - 14.5 %    Platelets 247 150 - 450 x10*3/uL   Comprehensive metabolic panel   Result Value Ref Range    Glucose 119 (H) 74 - 99 mg/dL    Sodium 138 136 - 145 mmol/L    Potassium 3.8 3.5 - 5.3 mmol/L    Chloride 104 98 - 107 mmol/L    Bicarbonate 24 21 - 32 mmol/L    Anion Gap 14 10 - 20 mmol/L    Urea Nitrogen 55 (H) 6 - 23 mg/dL    Creatinine 2.76 (H) 0.50 - 1.30 mg/dL    eGFR 23  (L) >60 mL/min/1.73m*2    Calcium 7.8 (L) 8.6 - 10.6 mg/dL    Albumin 3.0 (L) 3.4 - 5.0 g/dL    Alkaline Phosphatase 118 33 - 136 U/L    Total Protein 5.9 (L) 6.4 - 8.2 g/dL    AST 20 9 - 39 U/L    Bilirubin, Total 0.9 0.0 - 1.2 mg/dL    ALT 21 10 - 52 U/L   Magnesium   Result Value Ref Range    Magnesium 2.05 1.60 - 2.40 mg/dL      Scheduled medications  [Held by provider] enoxaparin, 40 mg, subcutaneous, q24h  folic acid, 1 mg, oral, Daily  hydrALAZINE, 10 mg, oral, TID  isosorbide dinitrate, 10 mg, oral, TID  melatonin, 3 mg, oral, Daily  multivitamin with minerals, 1 tablet, oral, Daily  pantoprazole, 40 mg, intravenous, q12h CORNELIO  perflutren protein A microsphere, 0.5 mL, intravenous, Once in imaging  sulfamethoxazole-trimethoprim, 1 tablet, oral, q12h CORNELIO  sulfur hexafluoride microsphr, 2 mL, intravenous, Once in imaging  thiamine, 100 mg, oral, Daily    Continuous medications     PRN medications  PRN medications: loperamide     Assessment/Plan    Dinesh Mccall is a 79 year old man with unknown medical history presenting for fall with laceration in apparent acute decompensated heart failure of unclear etiology. He has severe bilateral LE edema with chronic skin changes and L dorsal mid foot cellulitis likely secondary to ulceration per CT read. Patient has signs via EMS, ED, primary team concerning for failure to thrive and social isolation. He is admitted for stabilization and work up of his cardiopulmonary status and cellulitis infection. He will need help from social work and likely rehab given long period of deconditioning.      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 No history of chest pain or heart attack. Some concern for reliability given unclear social status. His history and  presentation is consistent with acute decompensated heart failure of unknown etiology. BNP >2000.   Differential diagnosis of etiologies of HFrEF    - Ischemic CM, given his age group, family history of MI and heart failure, and his EKG of RBBB and 50 pack year smoking history     - Alcohol-induced CM? (History of drinking)    - Post-viral myocarditis?    - Amyloidosis?   Admission weight 230lb. A1C 6.6%; LDL-C 36. HIV and TSH WNL.   - Continuous Telemetry  - Strict I&O with daily weights  - Diuresis: bid 20 mg lasix IV: I/O negative ~5 L    lungs - less congestion, symptoms partially improved  - TTE 8/26/2024: LVEF 15-20%, global hypokinesia of LV, elevated mean LAP, moderate MR, moderate/severe TR, moderately enlarged RV, mild/moderately elevated PAP  Plan   - continue O2 cannula 2 LPM, keep SpO2 >94%  - Continue IV lasix  - Start hydralazine & isosorbide dinitrate for HFrEF (8/29/2024)  - Cardiac stress MRI deferred due to hypervolemia and Hb<9    - will correct the labs and repeat MRI next week  - plan to start GDMT if stable serum Cr + euvolemia achieved     # Left lower extremity infection, likely cellulitis per CT  Patient has LE edema bilaterally with more redness on the left. No pus noted. XR L Foot was suspicious for soft tissue gases. There is an ulceration on the anterior ankle but no open wound or evidence of pus. Culture were not taken prior to starting antibiotics. Follow up CT negative for deep space infection or osteo.   Antibiotics    - Vanc + Zosyn 8/25/2024 --> Vanc + ceftriaxone 8/25-8/26  Switched to oral antibiotics of bactrim 800 mg bid (since 8/26/24 - now). U/S Doppler DVT: no evidence of acute DVT both legs   Plan  - continue Bactrim (800) 2 tabs BID plan 5 days in total (d.4 -> 5)     # Mechanical Post fall   # Upper GI bleeding (non-variceal; suspected ulcer-related disease from chronic ibuprofen (advil) use) with anemia (Hb 7.2-7.3 --> 6.6)    - Ddx could have ACD/EMERY, anemia of CKD or  dilutional anemia  CT Head negative. Patient complaining of R hip swelling this afternoon. Denies pain, but hyperventilated on palpation. Hemoglobin 8.9 AM-> 7.2 PM -> 7.0-7.5 8/30.   :: On 8/28 - notified of new episode of melena this morning.   - T&S in process   - s/p 1 stitch for forehead laceration notable blood loss  - F/u Right Hip XR - severe osteoporosis of both hip (R>L)  - Serum iron 28, %saturation 8%, TIBC 353, ferritin 40   - corrected reticulocyte count 2.2%, index 1.15 (Ret-Hb 23 - low)     --> likely hypoproliferative anemia  Plan  - F/U CBC, keep Hb >8 g/dL due to GIB  - transfuse PRC 1 Unit  - start IV pantoprazole 80 mg IV stat then 40 mg IV bid  - GI deferred EGD until cardiac condition stable     # KIA vs CKD (Cr 2.2, no prior Cr level) suspected from cardiorenal syndrome  # Mild hypokalemia due to diuretics  Possibly pre-renal related to ADHF presentation. High protein in urine, consider nephrotic syndrome, intrinsic injury in context of ongoing ibuprofen use. Given poor cardiac function, cardiorenal syndrome could also contribute to his serum Cr rising. F/U Cr 2.2 -> 2.59 -> 2.48 -> 2.44 -> 2.57 -> 2.41 -> 2.79  - urine elytes: urine Na <10, urine Cr 82.3, Urine K/Cr 60  Plan  - Continue diuresis until euvolemia achieved  - Avoid nephrotoxic medications  - Monitoring of RFP twice daily  - correct hypokalemia (keep K >4, Mg>2 due to long QTc)     # Alcohol use  Patient mentioned multiple shots a day to help him sleep, later said one one shot with soda. He endorses drinking to drowsiness every night. Elevated alk phos.  - CIWA protocol discontinued on 8/27/2024     # Insomnia  - on Melatonin 3 mg po at night    # Diarrhea with UGIB (8/28/2024) - watery, no pus/mucus/blood  - no signs of abdominal pain, N/V  - Differential diagnosis: drug-induced (bactrim, furosemide)  - improved 8/29/2024     # Disposition: SNF/acute rehab facility  Patient likely not safe to go home for now. Will need social  work and APS check on his house.     P: Be careful given ADHF  E: PRN  N: cardiac diet  GI PPX: Pantoprazole IV for treatment of UGIB  DVT PPX: Lovenox held due to UGIB  ABX: Bactrim  NOK: Skyla Kincaid (Sister) [phone] or [phone]      Ziggy Webb MD  PGY-1, Internal Medicine/Medical Genetics

## 2024-08-30 NOTE — PROGRESS NOTES
Gastroenterology Consult Service Progress Note  Department of Gastroenterology & Hepatology  [facility]    [facility]  August 30, 2024   Patient: Dinesh Mccall    Medical Record: [id number]  Reason for Initial Consult: Melenic Stools    Interval History:   - Patient's hemoglobin remains stable in the ~7's and there have been no overt signs of bleeding   - There are concerns regarding patient's cardiac fitness to safely under an endoscopic procedure; TTE revealed an EF of 15-20%      Physical Exam:  Vitals:    08/29/24 1543 08/29/24 2017 08/30/24 0922 08/30/24 1417   BP: 132/84 (!) 146/92 148/76 123/69   BP Location: Right arm      Patient Position: Sitting      Pulse: 85 90 92 88   Resp: 18 20 18 20   Temp: 36.6 °C (97.9 °F) 37 °C (98.6 °F) 37 °C (98.6 °F) 36.8 °C (98.2 °F)   TempSrc: Temporal Temporal     SpO2: 97% 98% 95% 95%   Weight:       Height:             Intake/Output Summary (Last 24 hours) at 8/30/2024 1717  Last data filed at 8/30/2024 1210  Gross per 24 hour   Intake 240 ml   Output 2900 ml   Net -2660 ml       GGen: chronically ill appearing, NAD  HEENT: PEERL, EOMI, sclera anicteric, no conjunctival injection,   Resp: Normal work of breathing   CV: RRR, no JVD  GI: non-tender, non-distended,   MSK: warm and well perfused, no edema, bilateral LE edema   Neuro: AAOx3, no focal deficits  Skin: warm and dry    Labs:  Lab Results   Component Value Date    WBC 14.0 (H) 08/30/2024    HGB 7.0 (L) 08/30/2024    HCT 22.0 (L) 08/30/2024    MCV 90 08/30/2024     08/30/2024     Lab Results   Component Value Date    GLUCOSE 119 (H) 08/30/2024    CALCIUM 7.8 (L) 08/30/2024     08/30/2024    K 3.8 08/30/2024    CO2 24 08/30/2024     08/30/2024    BUN 55 (H) 08/30/2024    CREATININE 2.76 (H) 08/30/2024     Lab Results   Component Value Date    ALT 21 08/30/2024    AST 20 08/30/2024    ALKPHOS 118 08/30/2024    BILITOT 0.9 08/30/2024  "      Imaging:  === 08/25/24 ===    US LIVER WITH DOPPLER    - Impression -  1. Moderate volume ascites.  2. Hepatomegaly with diffusely increased echogenicity of the liver  which may be seen in the setting of steatosis. Correlate clinically  with liver enzymes.  3. Unremarkable Doppler interrogation of the liver.  4. Incidental note of a right pleural effusion.    I personally reviewed the images/study and I agree with the findings  as stated by Gaby Montoya MD. This study was interpreted at  [place], [place].    MACRO:  None    Signed by: Keron Abraham 8/27/2024 5:47 AM  Dictation workstation:   [id number]  === 08/25/24 ===    CT FOOT LEFT WO IV CONTRAST    - Impression -  1. Diffuse soft tissue swelling and edema with skin thickening and  skin surface ulceration of the dorsal midfoot. No underlying soft  tissue gas or collection to confirm the suspicion on recent plain  film.. These findings are most suspicious for superficial soft tissue  infection/cellulitis.  2. No erosive change or other acute osseous abnormality to suggest  osteomyelitis.    I personally reviewed the image(s)/study and resident interpretation  as stated by Dr. Zaida Seo MD. I agree with the findings as  stated. This study was interpreted at [place], [place].    MACRO:  None    Signed by: Enrique Ayala 8/25/2024 1:24 PM  Dictation workstation:   [id number]    Cardiology Procedures:  Transthoracic Echo (TTE) Complete With Contrast    Height: 1.803 m (5' 11\")   Weight: 104 kg (230 lb)   Blood Pressure: Not recorded    Date of Study: 8/26/24   Ordering Provider: Jagjit Montano MD   Clinical Indications: C/f ADHF       Reading Physicians  Performing Staff   Cardiology: Nolan Martinez MD    Tech: RT Kriss        Indications  Priority: Priority Discharge or Observation  C/f ADHF   Dx: SOB (shortness of breath) [R06.02 (ICD-10-CM)] "     PACS Images     Show images for Transthoracic Echo (TTE) Complete  Interpretation Summary  Show Result Comparison   [facility], [address] [place] [address]                 Tel [phone] and Fax [phone]     TRANSTHORACIC ECHOCARDIOGRAM REPORT        Patient Name:      LINDA KAUFMAN  Reading Physician:    [id number] Nolan Martinez MD  Study Date:        8/26/2024            Ordering Provider:    [id number] BRENDAN TY  MRN/PID:           [id number]             Fellow:  Accession#:        [id number]         Nurse:                Joan Guillen RN  Date of Birth/Age: 1945 / 79 years  Sonographer:          Pearl Carter RDCS  Gender:            M                    Additional Staff:  Height:            180.34 cm            Admit Date:           8/25/2024  Weight:            104.33 kg            Admission Status:     Inpatient -                                                                Priority discharge  BSA / BMI:         2.24 m2 / 32.08      Encounter#:           [id number]                     kg/m2  Blood Pressure:    129/77 mmHg          Department Location:  [facility]                                                                Invasive     Study Type:    TRANSTHORACIC ECHO (TTE) COMPLETE  Diagnosis/ICD: Shortness of breath-R06.02  Indication:    SOB, c/f ADHF  CPT Code:      Echo Complete w Full Doppler-[id number]     Patient History:  Pertinent History: Acute decompensated heart failure.     Study Detail: The following Echo studies were performed: 2D, M-Mode, Doppler and                color flow. Technically challenging study due to prominent lung                artifact and patient lying in supine position. Definity used as a                contrast agent for endocardial border definition and agitated                saline used as a  contrast agent for intraseptal flow evaluation.                Total contrast used for this procedure was 1.5 mL via IV push.        Critical Event  Critical Event: Test was completed as per department protocol.  Critical Finding: Unknown low EF.  Time Test was Completed: 9:30:00 AM  Notified: Dr. Martinez.  Attending notification time: 9:38:13 AM     PHYSICIAN INTERPRETATION:  Left Ventricle: The left ventricular systolic function is severely decreased, with a visually estimated ejection fraction of 15-20%. There is global hypokinesis of the left ventricle with minor regional variations. The left ventricular cavity size is severely dilated. There is mild eccentric left ventricular hypertrophy. Spectral Doppler shows a pseudonormal pattern of left ventricular diastolic filling. There is an elevated mean left atrial pressure. There is no definite left ventricular thrombus visualized.  Left Atrium: The left atrium is severely dilated. There is no evidence of a patent foramen ovale. A bubble study using agitated saline was performed. Bubble study is negative.  Right Ventricle: The right ventricle is moderately enlarged. There is low normal right ventricular systolic function.  Right Atrium: The right atrium is severely dilated.  Aortic Valve: The aortic valve is trileaflet. There is mild aortic valve cusp calcification. There is mild aortic valve thickening. There is evidence of mildly elevated transaortic gradients consistent with sclerosis of the aortic valve. The aortic valve dimensionless index is 0.54. There is trace aortic valve regurgitation. The peak instantaneous gradient of the aortic valve is 13.1 mmHg. The mean gradient of the aortic valve is 7.0 mmHg.  Mitral Valve: The mitral valve is mildly thickened. There is mild mitral annular calcification. There is moderate mitral valve regurgitation. The mitral regurgitant orifice area is 12 mm2. The mitral regurgitant volume is 17.53 ml.  Tricuspid Valve: The  tricuspid valve is structurally normal. There is moderate to severe tricuspid regurgitation.  Pulmonic Valve: The pulmonic valve is structurally normal. There is trace pulmonic valve regurgitation.  Pericardium: There is a trivial pericardial effusion.  Aorta: The aortic root is normal.  Pulmonary Artery: The tricuspid regurgitant velocity is 2.78 m/s, and with an estimated right atrial pressure of 15 mmHg, the estimated pulmonary artery pressure is mild to moderately elevated with the RVSP at 45.9 mmHg.  Systemic Veins: The inferior vena cava appears dilated. There is poor inspiratory collapse of the IVC (less than 50%), consistent with elevated right atrial pressure.        CONCLUSIONS:   1. The left ventricular systolic function is severely decreased, with a visually estimated ejection fraction of 15-20%.   2. There is global hypokinesis of the left ventricle with minor regional variations.   3. Spectral Doppler shows a pseudonormal pattern of left ventricular diastolic filling.   4. There is an elevated mean left atrial pressure.   5. Left ventricular cavity size is severely dilated.   6. No left ventricular thrombus visualized.   7. There is mild eccentric left ventricular hypertrophy.   8. There is low normal right ventricular systolic function.   9. Moderately enlarged right ventricle.  10. The left atrium is severely dilated.  11. The right atrium is severely dilated.  12. Moderate mitral valve regurgitation.  13. Moderate to severe tricuspid regurgitation visualized.  14. Aortic valve sclerosis.  15. Mild to moderately elevated pulmonary artery pressure.  16. There is no evidence of a patent foramen ovale.     QUANTITATIVE DATA SUMMARY:  2D MEASUREMENTS:                           Normal Ranges:  Ao Root d:     3.21 cm   (2.0-3.7cm)  LAs:           4.80 cm   (2.7-4.0cm)  IVSd:          1.00 cm   (0.6-1.1cm)  LVPWd:         0.80 cm   (0.6-1.1cm)  LVIDd:         7.30 cm   (3.9-5.9cm)  LVIDs:         6.50  cm  LV Mass Index: 136 g/m2  LVEDV Index:   117 ml/m2  LV % FS        11.0 %     LA VOLUME:                                Normal Ranges:  LA Vol A4C:        122.6 ml   (22+/-6mL/m2)  LA Vol A2C:        136.3 ml  LA Vol BP:         130.7 ml  LA Vol Index A4C:  54.8ml/m2  LA Vol Index A2C:  60.9 ml/m2  LA Vol Index BP:   58.4 ml/m2  LA Area A4C:       33.5 cm2  LA Area A2C:       35.7 cm2  LA Major Axis A4C: 7.8 cm  LA Major Axis A2C: 8.0 cm     RA VOLUME BY A/L METHOD:                        Normal Ranges:  RA Area A4C: 33.3 cm2     M-MODE MEASUREMENTS:                   Normal Ranges:  Ao Root: 3.20 cm (2.0-3.7cm)     AORTA MEASUREMENTS:                     Normal Ranges:  Asc Ao, d: 3.40 cm (2.1-3.4cm)     LV SYSTOLIC FUNCTION BY 2D PLANIMETRY (MOD):                       Normal Ranges:  EF-A4C View:    26 % (>=55%)  EF-A2C View:    6 %  EF-Biplane:     17 %  EF-Visual:      18 %  LV EF Reported: 18 %     LV DIASTOLIC FUNCTION:                           Normal Ranges:  MV Peak E:    1.20 m/s   (0.7-1.2 m/s)  MV Peak A:    0.83 m/s   (0.42-0.7 m/s)  E/A Ratio:    1.45       (1.0-2.2)  MV e'         0.065 m/s  (>8.0)  MV lateral e' 0.09 m/s  MV medial e'  0.04 m/s  MV A Dur:     90.00 msec  E/e' Ratio:   18.46      (<8.0)     MITRAL VALVE:                       Normal Ranges:  MV Vmax:    1.15 m/s (<=1.3m/s)  MV peak P.3 mmHg (<5mmHg)  MV mean PG: 3.0 mmHg (<2mmHg)  MV DT:      119 msec (150-240msec)     MITRAL INSUFFICIENCY:                            Normal Ranges:  PISA Radius:  0.5 cm  MR VTI:       150.00 cm  MR Vmax:      500.00 cm/s  MR Alias Mark: 37.2 cm/s  MR Volume:    17.53 ml  MR Flow Rt:   58.43 ml/s  MR EROA:      12 mm2     AORTIC VALVE:                                     Normal Ranges:  AoV Vmax:                1.81 m/s  (<=1.7m/s)  AoV Peak P.1 mmHg (<20mmHg)  AoV Mean P.0 mmHg  (1.7-11.5mmHg)  LVOT Max Mark:            1.04 m/s  (<=1.1m/s)  AoV VTI:                  30.10 cm  (18-25cm)  LVOT VTI:                16.30 cm  LVOT Diameter:           2.30 cm   (1.8-2.4cm)  AoV Area, VTI:           2.25 cm2  (2.5-5.5cm2)  AoV Area,Vmax:           2.39 cm2  (2.5-4.5cm2)  AoV Dimensionless Index: 0.54        RIGHT VENTRICLE:  TAPSE: 30.5 mm  RV s'  0.13 m/s     TRICUSPID VALVE/RVSP:                              Normal Ranges:  Peak TR Velocity: 2.78 m/s  RV Syst Pressure: 45.9 mmHg (< 30mmHg)  IVC Diam:         3.00 cm     PULMONIC VALVE:                          Normal Ranges:  PV Accel Time: 95 msec  (>120ms)  PV Max Mark:    1.0 m/s  (0.6-0.9m/s)  PV Max PG:     3.8 mmHg        [id number] Nolan Martinez MD  Electronically signed on 8/26/2024 at 9:53:20 AM           ** Final **    GI procedures    Assessment and Plan:        Dinesh Mccall is a 79-year-old male with an unknown past medical history who presented to the [facility] ED after falling at home. The patient presented with lower extremity edema concerning for new-onset CHF. A TTE performed on 8/25 revealed global hypokinesis of the left ventricle and an estimated EF of 15-20%. There are additional concerns for upper GI bleeding with melena and a hemoglobin drop from 7.2 to 6.6 following the use of Bactrim for cellulitis, for which gastroenterology has been consulted. The patient states that he continues to have dark watery stools following the initiation of Bactrim for the treatment of lower extremity cellulitis. His bowel movements are likely due to antibiotic use, as hemoglobin has remained stable. In the absence of a history of overt GI bleeding, iron deficiency may be multifactorial, potentially representing anemia of chronic disease. It is recommended to continue trending hemoglobin, and if evidence of overt GI bleeding arises, please re-consult.    #Normocytic Anemia   #Diarrhea   - hold on EGD until cardiac work up is complete and cardiac function improve  - transfuse for hgb of <8  - continue to trend hgb daily  -  consider adding Imodium to target diarrhea   - continue Protonix, 40mg BID   - GI to sign off.       Patient seen and discussed with attending, Dr. Edison Cummings.     Jame Hickman MD, PhD  Gastroenterology Fellow, PGY-6  [facility]   Division of Gastroenterology and Liver Disease       Thank you for the consultation.  The consulting team will sign off now. Please do not hesitate to contact us again on by paging the consultation team again between the weekday hours of 7 AM - 5 PM. If there is an urgent concern during the weekend, after-hours, or holidays; then please page the [place] GI fellow at [phone]. Thank you.      SIGNATURE: Jame Hickman MD PATIENT NAME: Dinesh Mccall   DATE: August 30, 2024 MRN: [id number]

## 2024-08-30 NOTE — PROGRESS NOTES
"Subjective:  SR 80s with isolated PVCs, rare couplets, triplets  Increasing oxygen requirements (up to 4L from 2)   Denies CP/dizziness, palpitations   +SOB, +orthopnea      Objective:  Physical Exam  General: Obese male sitting up in bed, 4L nc, NAD  Skin:  warm and dry  HEENT: EOMI. MMM  Cardiovascular: RRR. JVD to earlobe at 60 degrees   Respiratory:  reduced A/E bibasilar R>L  Gastrointestinal: distended  Genitourinary: purwick with yellow urine  Extremities: 3-4+ pitting edema up to mid chest  Neurological: no gross focal deficits  Psychiatric: calm and cooperative        Last Recorded Vitals  Blood pressure 148/76, pulse 92, temperature 37 °C (98.6 °F), resp. rate 18, height 1.803 m (5' 11\"), weight 104 kg (230 lb), SpO2 95%.     Imaging  8/26 Transthoracic Echo (TTE) Complete With Contrast   CONCLUSIONS:   1. The left ventricular systolic function is severely decreased, with a visually estimated ejection fraction of 15-20%.   2. There is global hypokinesis of the left ventricle with minor regional variations.   3. Spectral Doppler shows a pseudonormal pattern of left ventricular diastolic filling.   4. There is an elevated mean left atrial pressure.   5. Left ventricular cavity size is severely dilated.   6. No left ventricular thrombus visualized.   7. There is mild eccentric left ventricular hypertrophy.   8. There is low normal right ventricular systolic function.   9. Moderately enlarged right ventricle.  10. The left atrium is severely dilated.  11. The right atrium is severely dilated.  12. Moderate mitral valve regurgitation.  13. Moderate to severe tricuspid regurgitation visualized.  14. Aortic valve sclerosis.  15. Mild to moderately elevated pulmonary artery pressure.  16. There is no evidence of a patent foramen ovale.     8/25/24:  CT head w/o IV contrast:  IMPRESSION:  1. No acute cortical infarct or acute intracranial hemorrhage.  2. Frontal scalp laceration without underlying osseous " abnormality.    CXR 6:28am:  IMPRESSION:  1.  Large right-sided pleural effusion with likely adjacent airspace disease.  2. Left lung appears clear. No pneumothorax.    XR foot left:  IMPRESSION:  1. Suspected soft tissue gas of the left forefoot raising concern for deep soft tissue infection.  2. Generalized soft tissue swelling extends along the dorsum of the foot and into the ankle and lower leg.  3. Possible skin wound of the dorsum of the forefoot.    CXR 11:39am:  IMPRESSION:  Bilateral pleural effusions and likely bilateral basilar airspace  disease right worse than left. Findings could represent some edema.    CT left foot w/o IV contrast:  IMPRESSION:  1. Diffuse soft tissue swelling and edema with skin thickening and skin surface ulceration of the dorsal midfoot. No underlying soft tissue gas or collection to confirm the suspicion on recent plain film. These findings are most suspicious for superficial soft tissue infection/cellulitis.  2. No erosive change or other acute osseous abnormality to suggest osteomyelitis.    Imaging 8/26/24:  Vascular US LE Venous Duplex BL:  PRELIMINARY CONCLUSIONS:  Right Lower Venous: No evidence of acute deep vein thrombus visualized in the right lower extremity. Calf veins were imaged segmentally.  Left Lower Venous: No evidence of acute deep vein thrombus visualized in the left lower extremity. Calf veins were imaged segmentally.    US liver with doppler:  LIVER:  The liver is enlarged, measuring 19.2 cm and is diffusely echogenic in appearance, consistent with diffuse fatty infiltration.  GALLBLADDER:  The gallbladder is nondistended, and demonstrates no evidence of gallstones, wall thickening or surrounding fluid. The gallbladder wall thickness is 0.29 cm . Sonographic Wise's sign is negative.  BILIARY SYSTEM:  No evidence of intra or extrahepatic biliary dilatation is  identified; the common bile duct measures 3.8 mm.  IMPRESSION:  1. Moderate volume ascites.  2.  Hepatomegaly with diffusely increased echogenicity of the liver which may be seen in the setting of steatosis. Correlate clinically with liver enzymes.  3. Unremarkable Doppler interrogation of the liver.  4. Incidental note of a right pleural effusion.    Assessment/Plan   Dinesh Mccall is a 79 y.o. male presenting initially to ED by EMS with fall at home on 8/24/24, found to have acutely decompensated heart failure of unknown etiology with KIA, normocytic anemia, and left lower extremity cellulitis. Cardiology consulted for further management recommendations of acutely decompensated heart failure likely secondary to viral myocarditis vs. Ischemic cardiomyopathy    #Acute systolic heart failure (EF 15-20%)  - BNP 2182  - Continue Lasix 20 mg IV BID with  meals ( so he receives during waking hours)  - Aggressively replete lytes while diuresing. Keep K+>4 and Mg+ >2   - As for GDMT:            - BB: defer for now iso ADHF            - ACE/ARB/ARNI: defer for now iso KIA            - MRA: defer for now iso KIA            - SGLT2-inhibitor: defer for now. Iso KIA   - Continue Hydralazine 10 mg PO and isosorbide dinitrate 10mg TID. Increase as BP tolerates  - 2 gram sodium diet, 2 liter fluid restriction, strict I&O, daily standing weights   - defer ischemic evaluation until acute issues resolve and more euvolemic  - Consult inpatient heart failure navigator  -prior to discharge, please arrange for follow up with cardiology, call  to schedule        Cardiology will follow      PAMELA Owens-CNP  Cardiology Consults    Please call with any questions  Pager [phone] M-F 7a-6p; Saturday 7a-2p  Pager [phone] all other times

## 2024-08-30 NOTE — PROGRESS NOTES
Dinesh Mccall is a 79 y.o. male on day 5 of admission presenting with Acute HFrEF (heart failure with reduced ejection fraction) (Multi).    SW met with pt to discuss SNF choices. Pt [place]. Pt stated his current plan is to go to SNF, his siblings agreed to clean his place while he is there, and then he will return home.     SW stated she will print out a new list with facilities [place].    UPDATE 14:00  SW brought up new list to pt and informed him that [facility] not able [facility]. Pt said his sister will be coming on Sun and he will have her look then. SW asked if she can email/talk to his sister about it, pt agreed and gave contact info.    UPDATE 16:33  SW called pt's sister, Skyla, to discuss SNFs. Skyla gave options to SW and asked for her to make referral. Skyla said she will be going by pt's house on Sun to see the 'clean up' that is needed. Pt and his sister reported he had a water back up problem recently, and pt was trying to clean it up when he fell. According to pt, his apartment needed cleaned because of the water, not any other issues/concerns. Pt's sister does not think that pt will be able to return home after SNF and will have this convo with pt.     SW to follow.  Linda Nogueira MSW [facility]  Care Transitions  2  [phone] or Epic Secure Chat

## 2024-08-31 LAB
ABO GROUP (TYPE) IN BLOOD: NORMAL
ALBUMIN SERPL BCP-MCNC: 2.9 G/DL (ref 3.4–5)
ALBUMIN SERPL BCP-MCNC: 3 G/DL (ref 3.4–5)
ANION GAP SERPL CALC-SCNC: 16 MMOL/L (ref 10–20)
ANION GAP SERPL CALC-SCNC: 21 MMOL/L (ref 10–20)
ANTIBODY SCREEN: NORMAL
BASOPHILS # BLD AUTO: 0.02 X10*3/UL (ref 0–0.1)
BASOPHILS # BLD AUTO: 0.02 X10*3/UL (ref 0–0.1)
BASOPHILS NFR BLD AUTO: 0.2 %
BASOPHILS NFR BLD AUTO: 0.2 %
BLOOD EXPIRATION DATE: NORMAL
BNP SERPL-MCNC: 2402 PG/ML (ref 0–99)
BUN SERPL-MCNC: 45 MG/DL (ref 6–23)
BUN SERPL-MCNC: 47 MG/DL (ref 6–23)
CALCIUM SERPL-MCNC: 7.9 MG/DL (ref 8.6–10.6)
CALCIUM SERPL-MCNC: 8.1 MG/DL (ref 8.6–10.6)
CHLORIDE SERPL-SCNC: 100 MMOL/L (ref 98–107)
CHLORIDE SERPL-SCNC: 97 MMOL/L (ref 98–107)
CO2 SERPL-SCNC: 24 MMOL/L (ref 21–32)
CO2 SERPL-SCNC: 27 MMOL/L (ref 21–32)
CREAT SERPL-MCNC: 2.5 MG/DL (ref 0.5–1.3)
CREAT SERPL-MCNC: 2.63 MG/DL (ref 0.5–1.3)
DISPENSE STATUS: NORMAL
EGFRCR SERPLBLD CKD-EPI 2021: 24 ML/MIN/1.73M*2
EGFRCR SERPLBLD CKD-EPI 2021: 26 ML/MIN/1.73M*2
EOSINOPHIL # BLD AUTO: 0.15 X10*3/UL (ref 0–0.4)
EOSINOPHIL # BLD AUTO: 0.19 X10*3/UL (ref 0–0.4)
EOSINOPHIL NFR BLD AUTO: 1.3 %
EOSINOPHIL NFR BLD AUTO: 1.6 %
ERYTHROCYTE [DISTWIDTH] IN BLOOD BY AUTOMATED COUNT: 15.5 % (ref 11.5–14.5)
ERYTHROCYTE [DISTWIDTH] IN BLOOD BY AUTOMATED COUNT: 15.9 % (ref 11.5–14.5)
GLUCOSE SERPL-MCNC: 92 MG/DL (ref 74–99)
GLUCOSE SERPL-MCNC: 99 MG/DL (ref 74–99)
HCT VFR BLD AUTO: 23.3 % (ref 41–52)
HCT VFR BLD AUTO: 25.8 % (ref 41–52)
HGB BLD-MCNC: 6.9 G/DL (ref 13.5–17.5)
HGB BLD-MCNC: 8 G/DL (ref 13.5–17.5)
IMM GRANULOCYTES # BLD AUTO: 0.11 X10*3/UL (ref 0–0.5)
IMM GRANULOCYTES # BLD AUTO: 0.13 X10*3/UL (ref 0–0.5)
IMM GRANULOCYTES NFR BLD AUTO: 0.9 % (ref 0–0.9)
IMM GRANULOCYTES NFR BLD AUTO: 1.1 % (ref 0–0.9)
LYMPHOCYTES # BLD AUTO: 0.76 X10*3/UL (ref 0.8–3)
LYMPHOCYTES # BLD AUTO: 0.84 X10*3/UL (ref 0.8–3)
LYMPHOCYTES NFR BLD AUTO: 6.5 %
LYMPHOCYTES NFR BLD AUTO: 6.9 %
MAGNESIUM SERPL-MCNC: 2.08 MG/DL (ref 1.6–2.4)
MAGNESIUM SERPL-MCNC: 2.18 MG/DL (ref 1.6–2.4)
MCH RBC QN AUTO: 28.5 PG (ref 26–34)
MCH RBC QN AUTO: 28.6 PG (ref 26–34)
MCHC RBC AUTO-ENTMCNC: 29.6 G/DL (ref 32–36)
MCHC RBC AUTO-ENTMCNC: 31 G/DL (ref 32–36)
MCV RBC AUTO: 92 FL (ref 80–100)
MCV RBC AUTO: 96 FL (ref 80–100)
MONOCYTES # BLD AUTO: 0.75 X10*3/UL (ref 0.05–0.8)
MONOCYTES # BLD AUTO: 0.88 X10*3/UL (ref 0.05–0.8)
MONOCYTES NFR BLD AUTO: 6.4 %
MONOCYTES NFR BLD AUTO: 7.2 %
NEUTROPHILS # BLD AUTO: 10.19 X10*3/UL (ref 1.6–5.5)
NEUTROPHILS # BLD AUTO: 9.89 X10*3/UL (ref 1.6–5.5)
NEUTROPHILS NFR BLD AUTO: 83 %
NEUTROPHILS NFR BLD AUTO: 84.7 %
NRBC BLD-RTO: 0 /100 WBCS (ref 0–0)
NRBC BLD-RTO: 0.2 /100 WBCS (ref 0–0)
PHOSPHATE SERPL-MCNC: 4.1 MG/DL (ref 2.5–4.9)
PHOSPHATE SERPL-MCNC: 4.3 MG/DL (ref 2.5–4.9)
PLATELET # BLD AUTO: 256 X10*3/UL (ref 150–450)
PLATELET # BLD AUTO: 256 X10*3/UL (ref 150–450)
POTASSIUM SERPL-SCNC: 3.5 MMOL/L (ref 3.5–5.3)
POTASSIUM SERPL-SCNC: 3.8 MMOL/L (ref 3.5–5.3)
PRODUCT BLOOD TYPE: 5100
PRODUCT CODE: NORMAL
RBC # BLD AUTO: 2.42 X10*6/UL (ref 4.5–5.9)
RBC # BLD AUTO: 2.8 X10*6/UL (ref 4.5–5.9)
RH FACTOR (ANTIGEN D): NORMAL
SODIUM SERPL-SCNC: 138 MMOL/L (ref 136–145)
SODIUM SERPL-SCNC: 139 MMOL/L (ref 136–145)
UNIT ABO: NORMAL
UNIT NUMBER: NORMAL
UNIT RH: NORMAL
UNIT VOLUME: 350
WBC # BLD AUTO: 11.7 X10*3/UL (ref 4.4–11.3)
WBC # BLD AUTO: 12.3 X10*3/UL (ref 4.4–11.3)
XM INTEP: NORMAL

## 2024-08-31 PROCEDURE — 36415 COLL VENOUS BLD VENIPUNCTURE: CPT

## 2024-08-31 PROCEDURE — 86901 BLOOD TYPING SEROLOGIC RH(D): CPT

## 2024-08-31 PROCEDURE — 2500000004 HC RX 250 GENERAL PHARMACY W/ HCPCS (ALT 636 FOR OP/ED)

## 2024-08-31 PROCEDURE — 86923 COMPATIBILITY TEST ELECTRIC: CPT

## 2024-08-31 PROCEDURE — 1200000002 HC GENERAL ROOM WITH TELEMETRY DAILY

## 2024-08-31 PROCEDURE — 99233 SBSQ HOSP IP/OBS HIGH 50: CPT

## 2024-08-31 PROCEDURE — 36430 TRANSFUSION BLD/BLD COMPNT: CPT

## 2024-08-31 PROCEDURE — 2500000001 HC RX 250 WO HCPCS SELF ADMINISTERED DRUGS (ALT 637 FOR MEDICARE OP)

## 2024-08-31 PROCEDURE — 83880 ASSAY OF NATRIURETIC PEPTIDE: CPT

## 2024-08-31 PROCEDURE — 80069 RENAL FUNCTION PANEL: CPT

## 2024-08-31 PROCEDURE — 83735 ASSAY OF MAGNESIUM: CPT

## 2024-08-31 PROCEDURE — P9016 RBC LEUKOCYTES REDUCED: HCPCS

## 2024-08-31 PROCEDURE — 85025 COMPLETE CBC W/AUTO DIFF WBC: CPT

## 2024-08-31 PROCEDURE — 2500000002 HC RX 250 W HCPCS SELF ADMINISTERED DRUGS (ALT 637 FOR MEDICARE OP, ALT 636 FOR OP/ED)

## 2024-08-31 RX ORDER — TRAZODONE HYDROCHLORIDE 50 MG/1
50 TABLET ORAL NIGHTLY
Status: DISCONTINUED | OUTPATIENT
Start: 2024-08-31 | End: 2024-09-01

## 2024-08-31 RX ADMIN — Medication 1 TABLET: at 09:03

## 2024-08-31 RX ADMIN — TRAZODONE HYDROCHLORIDE 50 MG: 50 TABLET ORAL at 20:56

## 2024-08-31 RX ADMIN — SULFAMETHOXAZOLE AND TRIMETHOPRIM 1 TABLET: 800; 160 TABLET ORAL at 20:55

## 2024-08-31 RX ADMIN — ISOSORBIDE DINITRATE 10 MG: 10 TABLET ORAL at 18:32

## 2024-08-31 RX ADMIN — Medication 3 MG: at 18:32

## 2024-08-31 RX ADMIN — ISOSORBIDE DINITRATE 10 MG: 10 TABLET ORAL at 14:58

## 2024-08-31 RX ADMIN — HYDRALAZINE HYDROCHLORIDE 10 MG: 10 TABLET ORAL at 20:55

## 2024-08-31 RX ADMIN — FUROSEMIDE 10 MG/HR: 10 INJECTION, SOLUTION INTRAMUSCULAR; INTRAVENOUS at 11:40

## 2024-08-31 RX ADMIN — THIAMINE HCL TAB 100 MG 100 MG: 100 TAB at 09:03

## 2024-08-31 RX ADMIN — HYDRALAZINE HYDROCHLORIDE 10 MG: 10 TABLET ORAL at 09:03

## 2024-08-31 RX ADMIN — FOLIC ACID 1 MG: 1 TABLET ORAL at 09:03

## 2024-08-31 RX ADMIN — SULFAMETHOXAZOLE AND TRIMETHOPRIM 1 TABLET: 800; 160 TABLET ORAL at 09:04

## 2024-08-31 RX ADMIN — PANTOPRAZOLE SODIUM 40 MG: 40 INJECTION, POWDER, FOR SOLUTION INTRAVENOUS at 09:03

## 2024-08-31 RX ADMIN — PANTOPRAZOLE SODIUM 40 MG: 40 INJECTION, POWDER, FOR SOLUTION INTRAVENOUS at 20:56

## 2024-08-31 RX ADMIN — HYDRALAZINE HYDROCHLORIDE 10 MG: 10 TABLET ORAL at 14:58

## 2024-08-31 RX ADMIN — ISOSORBIDE DINITRATE 10 MG: 10 TABLET ORAL at 09:03

## 2024-08-31 ASSESSMENT — PAIN SCALES - GENERAL
PAINLEVEL_OUTOF10: 0 - NO PAIN
PAINLEVEL_OUTOF10: 0 - NO PAIN

## 2024-08-31 ASSESSMENT — COGNITIVE AND FUNCTIONAL STATUS - GENERAL
TURNING FROM BACK TO SIDE WHILE IN FLAT BAD: A LOT
STANDING UP FROM CHAIR USING ARMS: A LOT
TURNING FROM BACK TO SIDE WHILE IN FLAT BAD: A LOT
DAILY ACTIVITIY SCORE: 16
TOILETING: A LOT
DRESSING REGULAR UPPER BODY CLOTHING: A LITTLE
CLIMB 3 TO 5 STEPS WITH RAILING: TOTAL
HELP NEEDED FOR BATHING: A LOT
PERSONAL GROOMING: A LITTLE
HELP NEEDED FOR BATHING: A LOT
MOBILITY SCORE: 10
STANDING UP FROM CHAIR USING ARMS: A LOT
MOBILITY SCORE: 10
DRESSING REGULAR LOWER BODY CLOTHING: A LOT
PERSONAL GROOMING: A LITTLE
DAILY ACTIVITIY SCORE: 16
MOVING FROM LYING ON BACK TO SITTING ON SIDE OF FLAT BED WITH BEDRAILS: A LOT
MOVING FROM LYING ON BACK TO SITTING ON SIDE OF FLAT BED WITH BEDRAILS: A LOT
WALKING IN HOSPITAL ROOM: TOTAL
MOVING TO AND FROM BED TO CHAIR: A LOT
DRESSING REGULAR LOWER BODY CLOTHING: A LOT
WALKING IN HOSPITAL ROOM: TOTAL
TOILETING: A LOT
MOVING TO AND FROM BED TO CHAIR: A LOT
DRESSING REGULAR UPPER BODY CLOTHING: A LITTLE
CLIMB 3 TO 5 STEPS WITH RAILING: TOTAL

## 2024-08-31 ASSESSMENT — PAIN - FUNCTIONAL ASSESSMENT
PAIN_FUNCTIONAL_ASSESSMENT: 0-10
PAIN_FUNCTIONAL_ASSESSMENT: 0-10

## 2024-08-31 NOTE — PROGRESS NOTES
Dinesh Mccall is a 79 y.o. male on day 6 of admission presenting with Acute HFrEF (heart failure with reduced ejection fraction) (Multi).    TC placed to patient's sister to review discharge planning.  Family requesting additional time to review SNF list.  SW informed patient's sister that [facility] able [facility] for SNF stay only.  Other facilities, [facility], [facility], and [facility] are unable to accept patient at this time.  SW agreed to follow-up Sunday afternoon to discuss further per family's request     Addendum:  SW received follow-up TC from patient's sister Ivory to provide additional SNF choices.  SW added [facility], [facility], and [facility].  JJ will continue to follow, and assist as needed    Madison FLORES

## 2024-08-31 NOTE — CARE PLAN
The patient's goals for the shift include will be safe and free from falls or injury throughout the shift    The clinical goals for the shift include Pt will not have tarry black stools throughout the shift    Problem: Skin  Goal: Decreased wound size/increased tissue granulation at next dressing change  Flowsheets (Taken 8/31/2024 1850)  Decreased wound size/increased tissue granulation at next dressing change:   Promote sleep for wound healing   Protective dressings over bony prominences  Goal: Participates in plan/prevention/treatment measures  Flowsheets (Taken 8/31/2024 1850)  Participates in plan/prevention/treatment measures:   Discuss with provider PT/OT consult   Elevate heels   Increase activity/out of bed for meals  Goal: Prevent/manage excess moisture  Flowsheets (Taken 8/31/2024 1850)  Prevent/manage excess moisture:   Cleanse incontinence/protect with barrier cream   Follow provider orders for dressing changes   Moisturize dry skin  Goal: Prevent/minimize sheer/friction injuries  Flowsheets (Taken 8/31/2024 1850)  Prevent/minimize sheer/friction injuries:   Complete micro-shifts as needed if patient unable. Adjust patient position to relieve pressure points, not a full turn   Turn/reposition every 2 hours/use positioning/transfer devices   Use pull sheet  Goal: Promote/optimize nutrition  Flowsheets (Taken 8/31/2024 1850)  Promote/optimize nutrition: Monitor/record intake including meals  Goal: Promote skin healing  Flowsheets (Taken 8/31/2024 1850)  Promote skin healing:   Assess skin/pad under line(s)/device(s)   Protective dressings over bony prominences     Over the shift, the patient did not make progress toward the following goals. Barriers to progression include mobility. Recommendations to address these barriers include assistance with transfers and repositioning.

## 2024-08-31 NOTE — PROGRESS NOTES
Dinesh Mccall is a 79 y.o. male on day 6 of admission presenting with Acute HFrEF (heart failure with reduced ejection fraction) (Multi).      Subjective   NAEO. The pt denies melena, hematochezia, hematemesis ovn. He reports being able to tolerate PO intake well since yesterday with minimal pain. He notes improvement to dyspnea and BLE edema. No other complaints.    Objective     Last Recorded Vitals  /71   Pulse 82   Temp 36.5 °C (97.7 °F) (Temporal)   Resp 16   Wt 104 kg (230 lb)   SpO2 95%   Intake/Output last 3 Shifts:    Intake/Output Summary (Last 24 hours) at 8/31/2024 1516  Last data filed at 8/31/2024 1445  Gross per 24 hour   Intake 960 ml   Output 7500 ml   Net -6540 ml       Admission Weight  Weight: 104 kg (230 lb) (08/25/24 0415)    Daily Weight  08/25/24 : 104 kg (230 lb)    Physical Exam  Constitutional:       General: He is not in acute distress.     Appearance: He is obese. He is ill-appearing.   HENT:      Head: Normocephalic.      Nose: No rhinorrhea.   Eyes:      Extraocular Movements: Extraocular movements intact.      Conjunctiva/sclera: Conjunctivae normal.      Pupils: Pupils are equal, round, and reactive to light.   Cardiovascular:      Rate and Rhythm: Normal rate and regular rhythm.      Comments: Heart sounds are distant. Dorsalis pedis artery obtained via doppler.  Pulmonary:      Effort: Pulmonary effort is normal. No respiratory distress.      Breath sounds: No stridor. No wheezing or rhonchi.      Mildly decreased breath sound bilaterally at basal lung field.  Abdominal:      General: There is moderate distension.     Tenderness: There is no abdominal tenderness, no guarding.   Musculoskeletal:         General: Signs of injury present.      Cervical back: Normal range of motion.      Limited evaluation of pedal pitting edema due to wound      dressing at both legs, tense, 2+ pitting edema in BLE  Neurological:      General: No focal deficit present.      Mental  Status: He is alert.      Comments: Oriented and without FND spontaneous to conversation.    Psychiatric:         Mood and Affect: Mood normal.         Behavior: Behavior normal.     Relevant Results  Results for orders placed or performed during the hospital encounter of 08/25/24 (from the past 24 hour(s))   Renal function panel   Result Value Ref Range    Glucose 106 (H) 74 - 99 mg/dL    Sodium 138 136 - 145 mmol/L    Potassium 3.4 (L) 3.5 - 5.3 mmol/L    Chloride 102 98 - 107 mmol/L    Bicarbonate 24 21 - 32 mmol/L    Anion Gap 15 10 - 20 mmol/L    Urea Nitrogen 51 (H) 6 - 23 mg/dL    Creatinine 2.70 (H) 0.50 - 1.30 mg/dL    eGFR 23 (L) >60 mL/min/1.73m*2    Calcium 7.6 (L) 8.6 - 10.6 mg/dL    Phosphorus 4.1 2.5 - 4.9 mg/dL    Albumin 2.8 (L) 3.4 - 5.0 g/dL   Magnesium   Result Value Ref Range    Magnesium 1.94 1.60 - 2.40 mg/dL   Magnesium   Result Value Ref Range    Magnesium 2.18 1.60 - 2.40 mg/dL   Type and screen   Result Value Ref Range    ABO TYPE O     Rh TYPE POS     ANTIBODY SCREEN NEG    B-type natriuretic peptide   Result Value Ref Range    BNP 2,402 (H) 0 - 99 pg/mL   CBC and Auto Differential   Result Value Ref Range    WBC 11.7 (H) 4.4 - 11.3 x10*3/uL    nRBC 0.2 (H) 0.0 - 0.0 /100 WBCs    RBC 2.42 (L) 4.50 - 5.90 x10*6/uL    Hemoglobin 6.9 (L) 13.5 - 17.5 g/dL    Hematocrit 23.3 (L) 41.0 - 52.0 %    MCV 96 80 - 100 fL    MCH 28.5 26.0 - 34.0 pg    MCHC 29.6 (L) 32.0 - 36.0 g/dL    RDW 15.9 (H) 11.5 - 14.5 %    Platelets 256 150 - 450 x10*3/uL    Neutrophils % 84.7 40.0 - 80.0 %    Immature Granulocytes %, Automated 0.9 0.0 - 0.9 %    Lymphocytes % 6.5 13.0 - 44.0 %    Monocytes % 6.4 2.0 - 10.0 %    Eosinophils % 1.3 0.0 - 6.0 %    Basophils % 0.2 0.0 - 2.0 %    Neutrophils Absolute 9.89 (H) 1.60 - 5.50 x10*3/uL    Immature Granulocytes Absolute, Automated 0.11 0.00 - 0.50 x10*3/uL    Lymphocytes Absolute 0.76 (L) 0.80 - 3.00 x10*3/uL    Monocytes Absolute 0.75 0.05 - 0.80 x10*3/uL    Eosinophils  Absolute 0.15 0.00 - 0.40 x10*3/uL    Basophils Absolute 0.02 0.00 - 0.10 x10*3/uL   Renal Function Panel   Result Value Ref Range    Glucose 99 74 - 99 mg/dL    Sodium 139 136 - 145 mmol/L    Potassium 3.8 3.5 - 5.3 mmol/L    Chloride 100 98 - 107 mmol/L    Bicarbonate 27 21 - 32 mmol/L    Anion Gap 16 10 - 20 mmol/L    Urea Nitrogen 47 (H) 6 - 23 mg/dL    Creatinine 2.63 (H) 0.50 - 1.30 mg/dL    eGFR 24 (L) >60 mL/min/1.73m*2    Calcium 8.1 (L) 8.6 - 10.6 mg/dL    Phosphorus 4.3 2.5 - 4.9 mg/dL    Albumin 2.9 (L) 3.4 - 5.0 g/dL   Prepare RBC: 1 Units   Result Value Ref Range    PRODUCT CODE O7661F04     Unit Number [id number]     Unit ABO O     Unit RH POS     XM INTEP COMP     Dispense Status IS     Blood Expiration Date 9/27/2024 11:59:00 PM EDT     PRODUCT BLOOD TYPE 5100     UNIT VOLUME 350       Scheduled medications  [Held by provider] enoxaparin, 40 mg, subcutaneous, q24h  folic acid, 1 mg, oral, Daily  hydrALAZINE, 10 mg, oral, TID  isosorbide dinitrate, 10 mg, oral, TID  melatonin, 3 mg, oral, Daily  multivitamin with minerals, 1 tablet, oral, Daily  pantoprazole, 40 mg, intravenous, q12h CORNELIO  perflutren protein A microsphere, 0.5 mL, intravenous, Once in imaging  sulfamethoxazole-trimethoprim, 1 tablet, oral, q12h CORNELIO  sulfur hexafluoride microsphr, 2 mL, intravenous, Once in imaging  thiamine, 100 mg, oral, Daily  traZODone, 50 mg, oral, Nightly    Continuous medications  furosemide, 10 mg/hr, Last Rate: 10 mg/hr (08/31/24 1140)      PRN medications  PRN medications: loperamide     Assessment/Plan    Dinesh Mccall is a 79 year old man with unknown medical history presenting for fall with laceration in apparent acute decompensated heart failure of unclear etiology. He has severe bilateral LE edema with chronic skin changes and L dorsal mid foot cellulitis likely secondary to ulceration per CT read. Patient has signs via EMS, ED, primary team concerning for failure to thrive and social isolation.  He is admitted for stabilization and work up of his cardiopulmonary status and cellulitis infection. He will need help from social work and likely rehab given long period of deconditioning. He had significant output on lasix gtt with improvement to dyspnea and BLE edema, now on 4LNC, will c/w lasix gtt for today.     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 No history of chest pain or heart attack. Some concern for reliability given unclear social status. His history and presentation is consistent with acute decompensated heart failure of unknown etiology. BNP >2000.   Differential diagnosis of etiologies of HFrEF    - Ischemic CM, given his age group, family history of MI and heart failure, and his EKG of RBBB and 50 pack year smoking history     - Alcohol-induced CM? (History of drinking)    - Post-viral myocarditis?    - Amyloidosis?   Admission weight 230lb. A1C 6.6%; LDL-C 36. HIV and TSH WNL.   - Continuous Telemetry  - Strict I&O with daily weights  - Diuresis: bid 20 mg lasix IV: I/O negative ~5 L    lungs - less congestion, symptoms partially improved  - TTE 8/26/2024: LVEF 15-20%, global hypokinesia of LV, elevated mean LAP, moderate MR, moderate/severe TR, moderately enlarged RV, mild/moderately elevated PAP  Plan   - continue O2 cannula 2 LPM, keep SpO2 >94%  - Continue IV lasix gtt  - Start hydralazine & isosorbide dinitrate for HFrEF (8/29/2024)  - Cardiac stress MRI deferred due to hypervolemia and Hb<9    - will correct the labs and repeat MRI next week  - plan to start GDMT if stable serum Cr + euvolemia achieved     # Left lower extremity infection, likely cellulitis per CT  Patient has LE edema bilaterally with more redness on the left. No pus noted. XR L Foot was suspicious for  soft tissue gases. There is an ulceration on the anterior ankle but no open wound or evidence of pus. Culture were not taken prior to starting antibiotics. Follow up CT negative for deep space infection or osteo.   Antibiotics    - Vanc + Zosyn 8/25/2024 --> Vanc + ceftriaxone 8/25-8/26  Switched to oral antibiotics of bactrim 800 mg bid (since 8/26/24 - now). U/S Doppler DVT: no evidence of acute DVT both legs   Plan  - continue Bactrim (800) 2 tabs BID plan 5 days in total     # Mechanical Post fall   # Upper GI bleeding (non-variceal; suspected ulcer-related disease from chronic ibuprofen (advil) use) with anemia (Hb 7.2-7.3 --> 6.6)    - Ddx could have ACD/EMERY, anemia of CKD or dilutional anemia  CT Head negative. Patient complaining of R hip swelling this afternoon. Denies pain, but hyperventilated on palpation. Hemoglobin 8.9 AM-> 7.2 PM -> 7.0-7.5 8/30.   :: On 8/28 - notified of new episode of melena this morning.   - T&S in process   - s/p 1 stitch for forehead laceration notable blood loss  - F/u Right Hip XR - severe osteoporosis of both hip (R>L)  - Serum iron 28, %saturation 8%, TIBC 353, ferritin 40   - corrected reticulocyte count 2.2%, index 1.15 (Ret-Hb 23 - low)     --> likely hypoproliferative anemia  Plan  - F/U CBC, keep Hb >8 g/dL due to GIB  -req 1U pRBCs for Hgb 6.9 on 8/31  - s/p IV pantoprazole 80 mg IV, c/w 40 mg IV bid  - GI deferred EGD until cardiac condition stable     # KIA vs CKD (Cr 2.2, no prior Cr level) suspected from cardiorenal syndrome  # Mild hypokalemia due to diuretics  Possibly pre-renal related to ADHF presentation. High protein in urine, consider nephrotic syndrome, intrinsic injury in context of ongoing ibuprofen use. Given poor cardiac function, cardiorenal syndrome could also contribute to his serum Cr rising. F/U Cr 2.2 -> 2.59 -> 2.48 -> 2.44 -> 2.57 -> 2.41 -> 2.79  - urine elytes: urine Na <10, urine Cr 82.3, Urine K/Cr 60  Plan  - Continue diuresis until  euvolemia achieved  - Avoid nephrotoxic medications  - Monitoring of RFP twice daily  - correct hypokalemia (keep K >4, Mg>2 due to long QTc)     # Alcohol use  Patient mentioned multiple shots a day to help him sleep, later said one one shot with soda. He endorses drinking to drowsiness every night. Elevated alk phos.  - CIWA protocol discontinued on 8/27/2024     # Insomnia  - on Melatonin 3 mg po at night    # Diarrhea with UGIB (8/28/2024) - watery, no pus/mucus/blood  - no signs of abdominal pain, N/V  - Differential diagnosis: drug-induced (bactrim, furosemide)  - improved 8/29/2024     # Disposition: SNF/acute rehab facility  Patient likely not safe to go home for now. Will need social work and APS check on his house.     P: Be careful given ADHF  E: PRN  N: cardiac diet  GI PPX: Pantoprazole IV for treatment of UGIB  DVT PPX: Lovenox held due to UGIB  ABX: Bactrim  NOK: Skyla Kincaid (Sister) [phone] or [phone]     Oralia Wray MD MPH

## 2024-09-01 VITALS
DIASTOLIC BLOOD PRESSURE: 57 MMHG | WEIGHT: 230 LBS | RESPIRATION RATE: 18 BRPM | BODY MASS INDEX: 32.2 KG/M2 | OXYGEN SATURATION: 92 % | TEMPERATURE: 97.9 F | HEART RATE: 92 BPM | SYSTOLIC BLOOD PRESSURE: 103 MMHG | HEIGHT: 71 IN

## 2024-09-01 LAB
ALBUMIN SERPL BCP-MCNC: 2.8 G/DL (ref 3.4–5)
ALBUMIN SERPL BCP-MCNC: 3 G/DL (ref 3.4–5)
ALBUMIN SERPL BCP-MCNC: 3 G/DL (ref 3.4–5)
ANION GAP SERPL CALC-SCNC: 15 MMOL/L (ref 10–20)
ANION GAP SERPL CALC-SCNC: 16 MMOL/L (ref 10–20)
ANION GAP SERPL CALC-SCNC: 17 MMOL/L
BASOPHILS # BLD AUTO: 0.02 X10*3/UL (ref 0–0.1)
BASOPHILS NFR BLD AUTO: 0.2 %
BUN SERPL-MCNC: 40 MG/DL (ref 6–23)
BUN SERPL-MCNC: 43 MG/DL (ref 6–23)
BUN SERPL-MCNC: 49 MG/DL (ref 6–23)
CALCIUM SERPL-MCNC: 7.3 MG/DL (ref 8.6–10.6)
CALCIUM SERPL-MCNC: 7.7 MG/DL (ref 8.6–10.6)
CALCIUM SERPL-MCNC: 7.8 MG/DL (ref 8.6–10.6)
CHLORIDE SERPL-SCNC: 92 MMOL/L (ref 98–107)
CHLORIDE SERPL-SCNC: 93 MMOL/L (ref 98–107)
CHLORIDE SERPL-SCNC: 94 MMOL/L (ref 98–107)
CO2 SERPL-SCNC: 29 MMOL/L (ref 21–32)
CO2 SERPL-SCNC: 30 MMOL/L (ref 21–32)
CO2 SERPL-SCNC: 31 MMOL/L (ref 21–32)
CREAT SERPL-MCNC: 2.79 MG/DL (ref 0.5–1.3)
CREAT SERPL-MCNC: 2.95 MG/DL (ref 0.5–1.3)
CREAT SERPL-MCNC: 3.08 MG/DL (ref 0.5–1.3)
EGFRCR SERPLBLD CKD-EPI 2021: 20 ML/MIN/1.73M*2
EGFRCR SERPLBLD CKD-EPI 2021: 21 ML/MIN/1.73M*2
EGFRCR SERPLBLD CKD-EPI 2021: 22 ML/MIN/1.73M*2
EOSINOPHIL # BLD AUTO: 0.17 X10*3/UL (ref 0–0.4)
EOSINOPHIL NFR BLD AUTO: 1.5 %
ERYTHROCYTE [DISTWIDTH] IN BLOOD BY AUTOMATED COUNT: 15.7 % (ref 11.5–14.5)
GLUCOSE SERPL-MCNC: 138 MG/DL (ref 74–99)
GLUCOSE SERPL-MCNC: 193 MG/DL (ref 74–99)
GLUCOSE SERPL-MCNC: 93 MG/DL (ref 74–99)
HCT VFR BLD AUTO: 26.3 % (ref 41–52)
HGB BLD-MCNC: 7.9 G/DL (ref 13.5–17.5)
IMM GRANULOCYTES # BLD AUTO: 0.1 X10*3/UL (ref 0–0.5)
IMM GRANULOCYTES NFR BLD AUTO: 0.9 % (ref 0–0.9)
LYMPHOCYTES # BLD AUTO: 0.87 X10*3/UL (ref 0.8–3)
LYMPHOCYTES NFR BLD AUTO: 7.8 %
MAGNESIUM SERPL-MCNC: 1.74 MG/DL (ref 1.6–2.4)
MAGNESIUM SERPL-MCNC: 1.88 MG/DL (ref 1.6–2.4)
MAGNESIUM SERPL-MCNC: 1.89 MG/DL (ref 1.6–2.4)
MCH RBC QN AUTO: 28.3 PG (ref 26–34)
MCHC RBC AUTO-ENTMCNC: 30 G/DL (ref 32–36)
MCV RBC AUTO: 94 FL (ref 80–100)
MONOCYTES # BLD AUTO: 0.83 X10*3/UL (ref 0.05–0.8)
MONOCYTES NFR BLD AUTO: 7.4 %
NEUTROPHILS # BLD AUTO: 9.19 X10*3/UL (ref 1.6–5.5)
NEUTROPHILS NFR BLD AUTO: 82.2 %
NRBC BLD-RTO: 0 /100 WBCS (ref 0–0)
PHOSPHATE SERPL-MCNC: 3.6 MG/DL (ref 2.5–4.9)
PHOSPHATE SERPL-MCNC: 3.7 MG/DL (ref 2.5–4.9)
PHOSPHATE SERPL-MCNC: 4.1 MG/DL (ref 2.5–4.9)
PLATELET # BLD AUTO: 254 X10*3/UL (ref 150–450)
POTASSIUM SERPL-SCNC: 2.9 MMOL/L (ref 3.5–5.3)
POTASSIUM SERPL-SCNC: 2.9 MMOL/L (ref 3.5–5.3)
POTASSIUM SERPL-SCNC: 3 MMOL/L (ref 3.5–5.3)
RBC # BLD AUTO: 2.79 X10*6/UL (ref 4.5–5.9)
SODIUM SERPL-SCNC: 135 MMOL/L (ref 136–145)
SODIUM SERPL-SCNC: 135 MMOL/L (ref 136–145)
SODIUM SERPL-SCNC: 138 MMOL/L (ref 136–145)
WBC # BLD AUTO: 11.2 X10*3/UL (ref 4.4–11.3)

## 2024-09-01 PROCEDURE — 2500000001 HC RX 250 WO HCPCS SELF ADMINISTERED DRUGS (ALT 637 FOR MEDICARE OP)

## 2024-09-01 PROCEDURE — 2500000002 HC RX 250 W HCPCS SELF ADMINISTERED DRUGS (ALT 637 FOR MEDICARE OP, ALT 636 FOR OP/ED)

## 2024-09-01 PROCEDURE — 99233 SBSQ HOSP IP/OBS HIGH 50: CPT

## 2024-09-01 PROCEDURE — 85025 COMPLETE CBC W/AUTO DIFF WBC: CPT

## 2024-09-01 PROCEDURE — 83735 ASSAY OF MAGNESIUM: CPT

## 2024-09-01 PROCEDURE — 93010 ELECTROCARDIOGRAM REPORT: CPT | Performed by: INTERNAL MEDICINE

## 2024-09-01 PROCEDURE — 36415 COLL VENOUS BLD VENIPUNCTURE: CPT

## 2024-09-01 PROCEDURE — 80069 RENAL FUNCTION PANEL: CPT

## 2024-09-01 PROCEDURE — 1200000002 HC GENERAL ROOM WITH TELEMETRY DAILY

## 2024-09-01 PROCEDURE — 2500000004 HC RX 250 GENERAL PHARMACY W/ HCPCS (ALT 636 FOR OP/ED)

## 2024-09-01 RX ORDER — POTASSIUM CHLORIDE 20 MEQ/1
20 TABLET, EXTENDED RELEASE ORAL ONCE
Status: COMPLETED | OUTPATIENT
Start: 2024-09-01 | End: 2024-09-01

## 2024-09-01 RX ORDER — POTASSIUM CHLORIDE 1.5 G/1.58G
40 POWDER, FOR SOLUTION ORAL ONCE
Status: COMPLETED | OUTPATIENT
Start: 2024-09-01 | End: 2024-09-01

## 2024-09-01 RX ORDER — PETROLATUM 420 MG/G
OINTMENT TOPICAL
Status: DISCONTINUED | OUTPATIENT
Start: 2024-09-01 | End: 2024-09-09 | Stop reason: HOSPADM

## 2024-09-01 RX ORDER — TALC
6 POWDER (GRAM) TOPICAL DAILY
Status: DISCONTINUED | OUTPATIENT
Start: 2024-09-01 | End: 2024-09-02

## 2024-09-01 RX ORDER — POTASSIUM CHLORIDE 20 MEQ/1
40 TABLET, EXTENDED RELEASE ORAL ONCE
Status: COMPLETED | OUTPATIENT
Start: 2024-09-01 | End: 2024-09-01

## 2024-09-01 RX ORDER — TRAZODONE HYDROCHLORIDE 50 MG/1
25 TABLET ORAL NIGHTLY
Status: DISCONTINUED | OUTPATIENT
Start: 2024-09-01 | End: 2024-09-01

## 2024-09-01 RX ADMIN — ISOSORBIDE DINITRATE 10 MG: 10 TABLET ORAL at 09:43

## 2024-09-01 RX ADMIN — Medication 6 MG: at 19:01

## 2024-09-01 RX ADMIN — Medication 1 TABLET: at 09:43

## 2024-09-01 RX ADMIN — PANTOPRAZOLE SODIUM 40 MG: 40 INJECTION, POWDER, FOR SOLUTION INTRAVENOUS at 21:07

## 2024-09-01 RX ADMIN — ISOSORBIDE DINITRATE 10 MG: 10 TABLET ORAL at 19:02

## 2024-09-01 RX ADMIN — FOLIC ACID 1 MG: 1 TABLET ORAL at 09:43

## 2024-09-01 RX ADMIN — HYDRALAZINE HYDROCHLORIDE 10 MG: 10 TABLET ORAL at 15:27

## 2024-09-01 RX ADMIN — POTASSIUM CHLORIDE 40 MEQ: 1500 TABLET, EXTENDED RELEASE ORAL at 10:29

## 2024-09-01 RX ADMIN — PANTOPRAZOLE SODIUM 40 MG: 40 INJECTION, POWDER, FOR SOLUTION INTRAVENOUS at 09:43

## 2024-09-01 RX ADMIN — POTASSIUM CHLORIDE 40 MEQ: 1.5 POWDER, FOR SOLUTION ORAL at 21:30

## 2024-09-01 RX ADMIN — HYDRALAZINE HYDROCHLORIDE 10 MG: 10 TABLET ORAL at 21:07

## 2024-09-01 RX ADMIN — ISOSORBIDE DINITRATE 10 MG: 10 TABLET ORAL at 15:27

## 2024-09-01 RX ADMIN — POTASSIUM CHLORIDE 20 MEQ: 1500 TABLET, EXTENDED RELEASE ORAL at 00:34

## 2024-09-01 RX ADMIN — HYDRALAZINE HYDROCHLORIDE 10 MG: 10 TABLET ORAL at 09:43

## 2024-09-01 RX ADMIN — THIAMINE HCL TAB 100 MG 100 MG: 100 TAB at 09:43

## 2024-09-01 ASSESSMENT — COGNITIVE AND FUNCTIONAL STATUS - GENERAL
MOBILITY SCORE: 10
PERSONAL GROOMING: A LITTLE
TURNING FROM BACK TO SIDE WHILE IN FLAT BAD: A LOT
STANDING UP FROM CHAIR USING ARMS: A LOT
CLIMB 3 TO 5 STEPS WITH RAILING: TOTAL
DRESSING REGULAR LOWER BODY CLOTHING: A LOT
MOVING TO AND FROM BED TO CHAIR: A LOT
WALKING IN HOSPITAL ROOM: TOTAL
DRESSING REGULAR UPPER BODY CLOTHING: A LITTLE
TOILETING: A LOT
DAILY ACTIVITIY SCORE: 16
MOVING FROM LYING ON BACK TO SITTING ON SIDE OF FLAT BED WITH BEDRAILS: A LOT
HELP NEEDED FOR BATHING: A LOT

## 2024-09-01 ASSESSMENT — PAIN SCALES - GENERAL
PAINLEVEL_OUTOF10: 0 - NO PAIN
PAINLEVEL_OUTOF10: 0 - NO PAIN

## 2024-09-01 ASSESSMENT — PAIN - FUNCTIONAL ASSESSMENT
PAIN_FUNCTIONAL_ASSESSMENT: 0-10
PAIN_FUNCTIONAL_ASSESSMENT: 0-10

## 2024-09-01 NOTE — CARE PLAN
The patient's goals for the shift include  Q2 turns to prevent pressure sores and provide comfort  Problem: Skin  Goal: Decreased wound size/increased tissue granulation at next dressing change  Outcome: Progressing     Problem: Skin  Goal: Participates in plan/prevention/treatment measures  Outcome: Progressing     Problem: Skin  Goal: Prevent/manage excess moisture  Outcome: Progressing     Problem: Skin  Goal: Prevent/minimize sheer/friction injuries  Outcome: Progressing     Problem: Skin  Goal: Promote/optimize nutrition  Outcome: Progressing     Problem: Skin  Goal: Promote skin healing  Outcome: Progressing       The clinical goals for the shift include Pt will not have tarry black stools throughout the shift

## 2024-09-01 NOTE — PROGRESS NOTES
Dinesh Mccall is a 79 y.o. male on day 7 of admission presenting with Acute HFrEF (heart failure with reduced ejection fraction) (Multi).      Subjective   No acute event overnight. The pt denies melena, hematochezia, hematemesis ovn. He reports being able to tolerate PO intake well since yesterday with minimal pain. Tolerate PRC transfusion well. He notes improvement to dyspnea and BLE edema. Can lie down on bed without worsening SOB. Reported not sleeping well despite melatonin pill. No other complaints.    Objective     Last Recorded Vitals  /61   Pulse 88   Temp 36.7 °C (98.1 °F)   Resp 16   Wt 104 kg (230 lb)   SpO2 94%   Intake/Output last 3 Shifts:    Intake/Output Summary (Last 24 hours) at 9/1/2024 1005  Last data filed at 9/1/2024 0800  Gross per 24 hour   Intake 1273.17 ml   Output 6250 ml   Net -4976.83 ml       Admission Weight  Weight: 104 kg (230 lb) (08/25/24 0415)    Daily Weight  08/25/24 : 104 kg (230 lb)    Physical Exam  Constitutional:       General: He is not in acute distress.     Appearance: He is obese. He is ill-appearing.   HENT:      Head: Normocephalic.      Nose: No rhinorrhea.   Eyes:      Extraocular Movements: Extraocular movements intact.      Conjunctiva/sclera: Conjunctivae normal.      Pupils: Pupils are equal, round, and reactive to light.   Cardiovascular:      Rate and Rhythm: Normal rate and regular rhythm.      Comments: Heart sounds are distant. Dorsalis pedis artery obtained via doppler.  Pulmonary:      Effort: Pulmonary effort is normal. No respiratory distress.      Breath sounds: No stridor. No wheezing or rhonchi.      Mildly decreased breath sound bilaterally at basal lung field.  Abdominal:      General: There is moderate distension.     Tenderness: There is no abdominal tenderness, no guarding.   Musculoskeletal:         General: Signs of injury present.      Cervical back: Normal range of motion.      Limited evaluation of pedal pitting edema due  to wound      dressing at both legs, tense, 2+ pitting edema in BLE  Neurological:      General: No focal deficit present.      Mental Status: He is alert.      Comments: Oriented and without FND spontaneous to conversation.    Psychiatric:         Mood and Affect: Mood normal.         Behavior: Behavior normal.     Relevant Results  Results for orders placed or performed during the hospital encounter of 08/25/24 (from the past 24 hour(s))   Prepare RBC: 1 Units   Result Value Ref Range    PRODUCT CODE O3741O20     Unit Number [id number]     Unit ABO O     Unit RH POS     XM INTEP COMP     Dispense Status TR     Blood Expiration Date 9/27/2024 11:59:00 PM EDT     PRODUCT BLOOD TYPE 5100     UNIT VOLUME 350    CBC and Auto Differential   Result Value Ref Range    WBC 12.3 (H) 4.4 - 11.3 x10*3/uL    nRBC 0.0 0.0 - 0.0 /100 WBCs    RBC 2.80 (L) 4.50 - 5.90 x10*6/uL    Hemoglobin 8.0 (L) 13.5 - 17.5 g/dL    Hematocrit 25.8 (L) 41.0 - 52.0 %    MCV 92 80 - 100 fL    MCH 28.6 26.0 - 34.0 pg    MCHC 31.0 (L) 32.0 - 36.0 g/dL    RDW 15.5 (H) 11.5 - 14.5 %    Platelets 256 150 - 450 x10*3/uL    Neutrophils % 83.0 40.0 - 80.0 %    Immature Granulocytes %, Automated 1.1 (H) 0.0 - 0.9 %    Lymphocytes % 6.9 13.0 - 44.0 %    Monocytes % 7.2 2.0 - 10.0 %    Eosinophils % 1.6 0.0 - 6.0 %    Basophils % 0.2 0.0 - 2.0 %    Neutrophils Absolute 10.19 (H) 1.60 - 5.50 x10*3/uL    Immature Granulocytes Absolute, Automated 0.13 0.00 - 0.50 x10*3/uL    Lymphocytes Absolute 0.84 0.80 - 3.00 x10*3/uL    Monocytes Absolute 0.88 (H) 0.05 - 0.80 x10*3/uL    Eosinophils Absolute 0.19 0.00 - 0.40 x10*3/uL    Basophils Absolute 0.02 0.00 - 0.10 x10*3/uL   Magnesium   Result Value Ref Range    Magnesium 2.08 1.60 - 2.40 mg/dL   Renal Function Panel   Result Value Ref Range    Glucose 92 74 - 99 mg/dL    Sodium 138 136 - 145 mmol/L    Potassium 3.5 3.5 - 5.3 mmol/L    Chloride 97 (L) 98 - 107 mmol/L    Bicarbonate 24 21 - 32 mmol/L    Anion  Gap 21 (H) 10 - 20 mmol/L    Urea Nitrogen 45 (H) 6 - 23 mg/dL    Creatinine 2.50 (H) 0.50 - 1.30 mg/dL    eGFR 26 (L) >60 mL/min/1.73m*2    Calcium 7.9 (L) 8.6 - 10.6 mg/dL    Phosphorus 4.1 2.5 - 4.9 mg/dL    Albumin 3.0 (L) 3.4 - 5.0 g/dL   CBC and Auto Differential   Result Value Ref Range    WBC 11.2 4.4 - 11.3 x10*3/uL    nRBC 0.0 0.0 - 0.0 /100 WBCs    RBC 2.79 (L) 4.50 - 5.90 x10*6/uL    Hemoglobin 7.9 (L) 13.5 - 17.5 g/dL    Hematocrit 26.3 (L) 41.0 - 52.0 %    MCV 94 80 - 100 fL    MCH 28.3 26.0 - 34.0 pg    MCHC 30.0 (L) 32.0 - 36.0 g/dL    RDW 15.7 (H) 11.5 - 14.5 %    Platelets 254 150 - 450 x10*3/uL    Neutrophils % 82.2 40.0 - 80.0 %    Immature Granulocytes %, Automated 0.9 0.0 - 0.9 %    Lymphocytes % 7.8 13.0 - 44.0 %    Monocytes % 7.4 2.0 - 10.0 %    Eosinophils % 1.5 0.0 - 6.0 %    Basophils % 0.2 0.0 - 2.0 %    Neutrophils Absolute 9.19 (H) 1.60 - 5.50 x10*3/uL    Immature Granulocytes Absolute, Automated 0.10 0.00 - 0.50 x10*3/uL    Lymphocytes Absolute 0.87 0.80 - 3.00 x10*3/uL    Monocytes Absolute 0.83 (H) 0.05 - 0.80 x10*3/uL    Eosinophils Absolute 0.17 0.00 - 0.40 x10*3/uL    Basophils Absolute 0.02 0.00 - 0.10 x10*3/uL   Magnesium   Result Value Ref Range    Magnesium 1.89 1.60 - 2.40 mg/dL   Renal Function Panel   Result Value Ref Range    Glucose 93 74 - 99 mg/dL    Sodium 138 136 - 145 mmol/L    Potassium 2.9 (LL) 3.5 - 5.3 mmol/L    Chloride 94 (L) 98 - 107 mmol/L    Bicarbonate 30 21 - 32 mmol/L    Anion Gap 17 mmol/L    Urea Nitrogen 49 (H) 6 - 23 mg/dL    Creatinine 3.08 (H) 0.50 - 1.30 mg/dL    eGFR 20 (L) >60 mL/min/1.73m*2    Calcium 7.8 (L) 8.6 - 10.6 mg/dL    Phosphorus 4.1 2.5 - 4.9 mg/dL    Albumin 3.0 (L) 3.4 - 5.0 g/dL      Scheduled medications  [Held by provider] enoxaparin, 40 mg, subcutaneous, q24h  folic acid, 1 mg, oral, Daily  hydrALAZINE, 10 mg, oral, TID  isosorbide dinitrate, 10 mg, oral, TID  melatonin, 6 mg, oral, Daily  multivitamin with minerals, 1 tablet,  oral, Daily  pantoprazole, 40 mg, intravenous, q12h CORNELIO  perflutren protein A microsphere, 0.5 mL, intravenous, Once in imaging  potassium chloride CR, 40 mEq, oral, Once  sulfur hexafluoride microsphr, 2 mL, intravenous, Once in imaging  thiamine, 100 mg, oral, Daily  traZODone, 50 mg, oral, Nightly    Continuous medications  furosemide, 10 mg/hr, Last Rate: 10 mg/hr (08/31/24 9162)    PRN medications  PRN medications: loperamide     Assessment/Plan    Dinesh Mccall is a 79 year old man with unknown medical history presenting for fall with laceration in apparent acute decompensated heart failure of unclear etiology. He has severe bilateral LE edema with chronic skin changes and L dorsal mid foot cellulitis likely secondary to ulceration per CT read. Patient has signs via EMS, ED, primary team concerning for failure to thrive and social isolation. He is admitted for stabilization and work up of his cardiopulmonary status and cellulitis infection. He will need help from social work and likely rehab given long period of deconditioning. He had significant output on lasix gtt with improvement to dyspnea and BLE edema, now on 2 LNC. Will temporarily discontinue lasix gtt due to serum Cr rising to 3.08 today and correct hypokalemia from probably diuretic-induced.     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 No history of chest pain or heart attack. Some concern for reliability given unclear social status. His history and presentation is consistent with acute decompensated heart failure of unknown etiology. BNP >2000. TTE 8/26/2024: LVEF 15-20%, global hypokinesia of LV, elevated mean LAP, moderate MR, moderate/severe TR, moderately enlarged RV, mild/moderately elevated PAP. Admission weight 230lb.  A1C 6.6%; LDL-C 36. HIV and TSH WNL.   Differential diagnosis of etiologies of HFrEF    - Ischemic CM, given his age group, family history of MI and heart failure, and his EKG of RBBB and 50 pack year smoking history     - Alcohol-induced CM? (History of drinking)    - Post-viral myocarditis?    - Amyloidosis?   :: Diuresis: IV lasix drip 10 mg/hr - I/O negative ~1.5 L over 6 days  :: C/w hydralazine & isordil for HFrEF 8/29/2024-now  Plan   - continue O2 cannula 2 LPM, keep SpO2 >94%  - Hold gtt lasix due to worsening Cr, will follow him again in the afternoon. F/U RFP on 14.00.  - Cardiac stress MRI deferred due to hypervolemia and Hb<9  - plan to start GDMT if stable serum Cr + euvolemia achieved     # Left lower extremity infection, likely cellulitis per CT  Patient has LE edema bilaterally with more redness on the left. No pus noted. XR L Foot was suspicious for soft tissue gases. There is an ulceration on the anterior ankle but no open wound or evidence of pus. Culture were not taken prior to starting antibiotics. Follow up CT negative for deep space infection or osteo.   Antibiotics    - Vanc + Zosyn 8/25/2024 --> Vanc + ceftriaxone 8/25-8/26  Switched to oral antibiotics of bactrim 800 mg bid (since 8/26/24 - now). U/S Doppler DVT: no evidence of acute DVT both legs   Plan  - continue Bactrim (800) 2 tabs BID plan 5 days in total     # Mechanical Post fall   # Upper GI bleeding (non-variceal; suspected ulcer-related disease from chronic ibuprofen (advil) use) with anemia (Hb 7.2-7.3 --> 6.6)    - Ddx could have ACD/EMERY, anemia of CKD or dilutional anemia  CT Head negative. Patient complaining of R hip swelling this afternoon. Denies pain, but hyperventilated on palpation. Hemoglobin 8.9 AM-> 7.2 PM -> 7.0-7.5 8/30.   :: On 8/28 - notified of new episode of melena this morning.   - T&S in process   - s/p 1 stitch for forehead laceration notable blood loss  - F/u Right Hip XR - severe osteoporosis of both hip  (R>L)  - Serum iron 28, %saturation 8%, TIBC 353, ferritin 40   - corrected reticulocyte count 2.2%, index 1.15 (Ret-Hb 23 - low)     --> likely hypoproliferative anemia  :: Total PRC transfusion 2 units (8/28/24, 8/31/24)  Plan  - F/U CBC, keep Hb >8 g/dL due to GIB  - s/p IV pantoprazole 80 mg IV, c/w 40 mg IV bid  - GI deferred EGD until cardiac condition stable     # KIA vs CKD (Cr 2.2, no prior Cr level) suspected from cardiorenal syndrome  # Diuretic-induced hypokalemia (Furosemide)  Possibly pre-renal related to ADHF presentation. High protein in urine, consider nephrotic syndrome, intrinsic injury in context of ongoing ibuprofen use. Given poor cardiac function, cardiorenal syndrome could also contribute to his serum Cr rising. F/U Cr 2.2 -> 2.4-2.7 -> 3.08.  - urine elytes: urine Na <10, urine Cr 82.3, Urine K/Cr 60  Plan  - Suspected overdiuresis (worsening Cr after negative I/O and metabolic alkalosis with hypokalemia) - will hold gtt lasix for now and F/U RFP.  - Avoid nephrotoxic medications  - Monitoring of RFP twice daily  - correct hypokalemia (keep K >4, Mg>2 due to long QTc)     # Alcohol use  Patient mentioned multiple shots a day to help him sleep, later said one one shot with soda. He endorses drinking to drowsiness every night. Elevated alk phos.  - CIWA protocol discontinued on 8/27/2024     # Insomnia  - on Melatonin 3 -> 6 mg po at night  - due to Qtc prolongation -> lower dose of trazodone (50 mg)      yesterday to 25 mg today    # Diarrhea with UGIB (8/28/2024) - watery, no pus/mucus/blood  - no signs of abdominal pain, N/V  - Differential diagnosis: drug-induced (bactrim, furosemide)  - improved 8/29/2024     # Disposition: SNF/acute rehab facility  Patient likely not safe to go home for now. Will need social work and APS check on his house.     P: Be careful given ADHF  E: PRN  N: cardiac diet  GI PPX: Pantoprazole IV for treatment of UGIB  DVT PPX: Lovenox held due to UGIB  ABX:  Bactrim  NOK: Skyla Kincaid (Sister) [phone] or [phone]     Ziggy Webb MD

## 2024-09-02 LAB
ABO GROUP (TYPE) IN BLOOD: NORMAL
ALBUMIN SERPL BCP-MCNC: 2.9 G/DL (ref 3.4–5)
ALBUMIN SERPL BCP-MCNC: 3.3 G/DL (ref 3.4–5)
ANION GAP SERPL CALC-SCNC: 17 MMOL/L (ref 10–20)
ANION GAP SERPL CALC-SCNC: 17 MMOL/L (ref 10–20)
ANTIBODY SCREEN: NORMAL
APTT PPP: 72 SECONDS (ref 27–38)
APTT PPP: 73 SECONDS (ref 27–38)
BASOPHILS # BLD AUTO: 0.01 X10*3/UL (ref 0–0.1)
BASOPHILS # BLD AUTO: 0.03 X10*3/UL (ref 0–0.1)
BASOPHILS NFR BLD AUTO: 0.1 %
BASOPHILS NFR BLD AUTO: 0.2 %
BLOOD EXPIRATION DATE: NORMAL
BUN SERPL-MCNC: 40 MG/DL (ref 6–23)
BUN SERPL-MCNC: 42 MG/DL (ref 6–23)
CALCIUM SERPL-MCNC: 7.6 MG/DL (ref 8.6–10.6)
CALCIUM SERPL-MCNC: 7.8 MG/DL (ref 8.6–10.6)
CHLORIDE SERPL-SCNC: 93 MMOL/L (ref 98–107)
CHLORIDE SERPL-SCNC: 95 MMOL/L (ref 98–107)
CO2 SERPL-SCNC: 27 MMOL/L (ref 21–32)
CO2 SERPL-SCNC: 28 MMOL/L (ref 21–32)
CREAT SERPL-MCNC: 2.77 MG/DL (ref 0.5–1.3)
CREAT SERPL-MCNC: 2.82 MG/DL (ref 0.5–1.3)
DISPENSE STATUS: NORMAL
EGFRCR SERPLBLD CKD-EPI 2021: 22 ML/MIN/1.73M*2
EGFRCR SERPLBLD CKD-EPI 2021: 23 ML/MIN/1.73M*2
EOSINOPHIL # BLD AUTO: 0.03 X10*3/UL (ref 0–0.4)
EOSINOPHIL # BLD AUTO: 0.1 X10*3/UL (ref 0–0.4)
EOSINOPHIL NFR BLD AUTO: 0.2 %
EOSINOPHIL NFR BLD AUTO: 0.9 %
ERYTHROCYTE [DISTWIDTH] IN BLOOD BY AUTOMATED COUNT: 15.2 % (ref 11.5–14.5)
ERYTHROCYTE [DISTWIDTH] IN BLOOD BY AUTOMATED COUNT: 15.3 % (ref 11.5–14.5)
ERYTHROCYTE [DISTWIDTH] IN BLOOD BY AUTOMATED COUNT: 15.7 % (ref 11.5–14.5)
GLUCOSE SERPL-MCNC: 133 MG/DL (ref 74–99)
GLUCOSE SERPL-MCNC: 211 MG/DL (ref 74–99)
HCT VFR BLD AUTO: 20.9 % (ref 41–52)
HCT VFR BLD AUTO: 24.5 % (ref 41–52)
HCT VFR BLD AUTO: 25.9 % (ref 41–52)
HGB BLD-MCNC: 6.7 G/DL (ref 13.5–17.5)
HGB BLD-MCNC: 7.6 G/DL (ref 13.5–17.5)
HGB BLD-MCNC: 7.8 G/DL (ref 13.5–17.5)
IMM GRANULOCYTES # BLD AUTO: 0.09 X10*3/UL (ref 0–0.5)
IMM GRANULOCYTES # BLD AUTO: 0.14 X10*3/UL (ref 0–0.5)
IMM GRANULOCYTES NFR BLD AUTO: 0.8 % (ref 0–0.9)
IMM GRANULOCYTES NFR BLD AUTO: 1.1 % (ref 0–0.9)
INR PPP: 1.4 (ref 0.9–1.1)
INR PPP: 1.4 (ref 0.9–1.1)
LYMPHOCYTES # BLD AUTO: 0.8 X10*3/UL (ref 0.8–3)
LYMPHOCYTES # BLD AUTO: 0.84 X10*3/UL (ref 0.8–3)
LYMPHOCYTES NFR BLD AUTO: 6.5 %
LYMPHOCYTES NFR BLD AUTO: 7.5 %
MAGNESIUM SERPL-MCNC: 1.93 MG/DL (ref 1.6–2.4)
MAGNESIUM SERPL-MCNC: 2.1 MG/DL (ref 1.6–2.4)
MCH RBC QN AUTO: 28.2 PG (ref 26–34)
MCH RBC QN AUTO: 28.6 PG (ref 26–34)
MCH RBC QN AUTO: 28.8 PG (ref 26–34)
MCHC RBC AUTO-ENTMCNC: 30.1 G/DL (ref 32–36)
MCHC RBC AUTO-ENTMCNC: 31 G/DL (ref 32–36)
MCHC RBC AUTO-ENTMCNC: 32.1 G/DL (ref 32–36)
MCV RBC AUTO: 88 FL (ref 80–100)
MCV RBC AUTO: 92 FL (ref 80–100)
MCV RBC AUTO: 96 FL (ref 80–100)
MONOCYTES # BLD AUTO: 0.88 X10*3/UL (ref 0.05–0.8)
MONOCYTES # BLD AUTO: 0.91 X10*3/UL (ref 0.05–0.8)
MONOCYTES NFR BLD AUTO: 7.2 %
MONOCYTES NFR BLD AUTO: 8.2 %
NEUTROPHILS # BLD AUTO: 10.37 X10*3/UL (ref 1.6–5.5)
NEUTROPHILS # BLD AUTO: 9.2 X10*3/UL (ref 1.6–5.5)
NEUTROPHILS NFR BLD AUTO: 82.5 %
NEUTROPHILS NFR BLD AUTO: 84.8 %
NRBC BLD-RTO: 0 /100 WBCS (ref 0–0)
PHOSPHATE SERPL-MCNC: 3.3 MG/DL (ref 2.5–4.9)
PHOSPHATE SERPL-MCNC: 3.3 MG/DL (ref 2.5–4.9)
PLATELET # BLD AUTO: 236 X10*3/UL (ref 150–450)
PLATELET # BLD AUTO: 247 X10*3/UL (ref 150–450)
PLATELET # BLD AUTO: 283 X10*3/UL (ref 150–450)
POTASSIUM SERPL-SCNC: 4 MMOL/L (ref 3.5–5.3)
POTASSIUM SERPL-SCNC: 4.2 MMOL/L (ref 3.5–5.3)
PRODUCT BLOOD TYPE: 5100
PRODUCT CODE: NORMAL
PROTHROMBIN TIME: 15.7 SECONDS (ref 9.8–12.8)
PROTHROMBIN TIME: 16.2 SECONDS (ref 9.8–12.8)
RBC # BLD AUTO: 2.38 X10*6/UL (ref 4.5–5.9)
RBC # BLD AUTO: 2.66 X10*6/UL (ref 4.5–5.9)
RBC # BLD AUTO: 2.71 X10*6/UL (ref 4.5–5.9)
RH FACTOR (ANTIGEN D): NORMAL
SODIUM SERPL-SCNC: 134 MMOL/L (ref 136–145)
SODIUM SERPL-SCNC: 135 MMOL/L (ref 136–145)
UNIT ABO: NORMAL
UNIT NUMBER: NORMAL
UNIT RH: NORMAL
UNIT VOLUME: 350
WBC # BLD AUTO: 11.2 X10*3/UL (ref 4.4–11.3)
WBC # BLD AUTO: 12.3 X10*3/UL (ref 4.4–11.3)
WBC # BLD AUTO: 13 X10*3/UL (ref 4.4–11.3)
XM INTEP: NORMAL

## 2024-09-02 PROCEDURE — 1200000002 HC GENERAL ROOM WITH TELEMETRY DAILY

## 2024-09-02 PROCEDURE — 2500000004 HC RX 250 GENERAL PHARMACY W/ HCPCS (ALT 636 FOR OP/ED)

## 2024-09-02 PROCEDURE — 85025 COMPLETE CBC W/AUTO DIFF WBC: CPT

## 2024-09-02 PROCEDURE — 2500000001 HC RX 250 WO HCPCS SELF ADMINISTERED DRUGS (ALT 637 FOR MEDICARE OP)

## 2024-09-02 PROCEDURE — 99233 SBSQ HOSP IP/OBS HIGH 50: CPT

## 2024-09-02 PROCEDURE — 36415 COLL VENOUS BLD VENIPUNCTURE: CPT

## 2024-09-02 PROCEDURE — 85027 COMPLETE CBC AUTOMATED: CPT

## 2024-09-02 PROCEDURE — 83735 ASSAY OF MAGNESIUM: CPT

## 2024-09-02 PROCEDURE — P9016 RBC LEUKOCYTES REDUCED: HCPCS

## 2024-09-02 PROCEDURE — 85610 PROTHROMBIN TIME: CPT

## 2024-09-02 PROCEDURE — 80069 RENAL FUNCTION PANEL: CPT

## 2024-09-02 PROCEDURE — 86901 BLOOD TYPING SEROLOGIC RH(D): CPT

## 2024-09-02 PROCEDURE — 36430 TRANSFUSION BLD/BLD COMPNT: CPT

## 2024-09-02 PROCEDURE — 86923 COMPATIBILITY TEST ELECTRIC: CPT

## 2024-09-02 PROCEDURE — 2500000002 HC RX 250 W HCPCS SELF ADMINISTERED DRUGS (ALT 637 FOR MEDICARE OP, ALT 636 FOR OP/ED)

## 2024-09-02 RX ORDER — MAGNESIUM SULFATE HEPTAHYDRATE 40 MG/ML
2 INJECTION, SOLUTION INTRAVENOUS ONCE
Status: COMPLETED | OUTPATIENT
Start: 2024-09-03 | End: 2024-09-03

## 2024-09-02 RX ORDER — POLYETHYLENE GLYCOL 3350 17 G/17G
17 POWDER, FOR SOLUTION ORAL DAILY PRN
Status: DISCONTINUED | OUTPATIENT
Start: 2024-09-02 | End: 2024-09-09 | Stop reason: HOSPADM

## 2024-09-02 RX ORDER — ENOXAPARIN SODIUM 100 MG/ML
30 INJECTION SUBCUTANEOUS EVERY 24 HOURS
Status: DISCONTINUED | OUTPATIENT
Start: 2024-09-02 | End: 2024-09-09 | Stop reason: HOSPADM

## 2024-09-02 RX ORDER — OXYCODONE HYDROCHLORIDE 5 MG/1
5 TABLET ORAL EVERY 8 HOURS PRN
Status: DISCONTINUED | OUTPATIENT
Start: 2024-09-02 | End: 2024-09-09 | Stop reason: HOSPADM

## 2024-09-02 RX ORDER — POTASSIUM CHLORIDE 14.9 MG/ML
20 INJECTION INTRAVENOUS EVERY 4 HOURS
Status: COMPLETED | OUTPATIENT
Start: 2024-09-02 | End: 2024-09-02

## 2024-09-02 RX ORDER — FUROSEMIDE 10 MG/ML
80 INJECTION INTRAMUSCULAR; INTRAVENOUS ONCE
Status: COMPLETED | OUTPATIENT
Start: 2024-09-02 | End: 2024-09-02

## 2024-09-02 RX ORDER — POTASSIUM CHLORIDE 20 MEQ/1
40 TABLET, EXTENDED RELEASE ORAL ONCE
Status: COMPLETED | OUTPATIENT
Start: 2024-09-02 | End: 2024-09-02

## 2024-09-02 RX ORDER — RAMELTEON 8 MG/1
8 TABLET ORAL NIGHTLY
Status: DISCONTINUED | OUTPATIENT
Start: 2024-09-02 | End: 2024-09-09 | Stop reason: HOSPADM

## 2024-09-02 RX ORDER — ACETAMINOPHEN 325 MG/1
650 TABLET ORAL EVERY 6 HOURS PRN
Status: DISCONTINUED | OUTPATIENT
Start: 2024-09-02 | End: 2024-09-09 | Stop reason: HOSPADM

## 2024-09-02 RX ORDER — MIRTAZAPINE 15 MG/1
15 TABLET, FILM COATED ORAL NIGHTLY
Status: DISCONTINUED | OUTPATIENT
Start: 2024-09-02 | End: 2024-09-02

## 2024-09-02 RX ORDER — MAGNESIUM SULFATE 1 G/100ML
1 INJECTION INTRAVENOUS ONCE
Status: COMPLETED | OUTPATIENT
Start: 2024-09-02 | End: 2024-09-02

## 2024-09-02 ASSESSMENT — PAIN SCALES - GENERAL
PAINLEVEL_OUTOF10: 6
PAINLEVEL_OUTOF10: 0 - NO PAIN

## 2024-09-02 ASSESSMENT — COGNITIVE AND FUNCTIONAL STATUS - GENERAL
MOBILITY SCORE: 10
STANDING UP FROM CHAIR USING ARMS: A LOT
MOVING FROM LYING ON BACK TO SITTING ON SIDE OF FLAT BED WITH BEDRAILS: A LOT
TURNING FROM BACK TO SIDE WHILE IN FLAT BAD: A LOT
HELP NEEDED FOR BATHING: A LOT
DRESSING REGULAR UPPER BODY CLOTHING: A LITTLE
MOVING TO AND FROM BED TO CHAIR: A LOT
STANDING UP FROM CHAIR USING ARMS: A LOT
DAILY ACTIVITIY SCORE: 16
TOILETING: A LOT
WALKING IN HOSPITAL ROOM: TOTAL
DRESSING REGULAR LOWER BODY CLOTHING: A LOT
TURNING FROM BACK TO SIDE WHILE IN FLAT BAD: A LOT
WALKING IN HOSPITAL ROOM: TOTAL
MOVING FROM LYING ON BACK TO SITTING ON SIDE OF FLAT BED WITH BEDRAILS: A LOT
DAILY ACTIVITIY SCORE: 16
HELP NEEDED FOR BATHING: A LOT
DRESSING REGULAR LOWER BODY CLOTHING: A LOT
DRESSING REGULAR UPPER BODY CLOTHING: A LITTLE
TOILETING: A LOT
PERSONAL GROOMING: A LITTLE
MOBILITY SCORE: 10
CLIMB 3 TO 5 STEPS WITH RAILING: TOTAL
CLIMB 3 TO 5 STEPS WITH RAILING: TOTAL
MOVING TO AND FROM BED TO CHAIR: A LOT
PERSONAL GROOMING: A LITTLE

## 2024-09-02 ASSESSMENT — PAIN - FUNCTIONAL ASSESSMENT
PAIN_FUNCTIONAL_ASSESSMENT: 0-10
PAIN_FUNCTIONAL_ASSESSMENT: 0-10

## 2024-09-02 NOTE — CARE PLAN
Problem: Skin  Goal: Decreased wound size/increased tissue granulation at next dressing change  Outcome: Progressing     Problem: Skin  Goal: Participates in plan/prevention/treatment measures  Outcome: Progressing     Problem: Skin  Goal: Prevent/manage excess moisture  Outcome: Progressing     Problem: Skin  Goal: Prevent/minimize sheer/friction injuries  Outcome: Progressing   The patient's goals for the shift include      The clinical goals for the shift include Q2 turns prevent skin breakdowns    O

## 2024-09-02 NOTE — PROGRESS NOTES
Dinesh Mccall is a 79 y.o. male on day 8 of admission presenting with Acute HFrEF (heart failure with reduced ejection fraction) (Multi).      Subjective   Patient reported having difficulty sleeping and still have some degree of SOB. Saying melatonin did not help with his sleep aid. After diuresis, he felt very thirsty. Did not have any chest pain, fever, or chills. No acute event overnight. He is hemodynamically stable. Reported that he had one BM, and had some black component in the stool.    12.10 pm He reported his left arm to have gotten more painful and bigger. Having started to get painful for several days but noticed it gets bigger and more bruised today. Painful to move his arm and elbow but not his shoulder.       Objective     Last Recorded Vitals  /73   Pulse 97   Temp 37.4 °C (99.3 °F)   Resp 18   Wt 104 kg (230 lb)   SpO2 95%   Intake/Output last 3 Shifts:    Intake/Output Summary (Last 24 hours) at 9/2/2024 1258  Last data filed at 9/2/2024 1100  Gross per 24 hour   Intake 570 ml   Output 1550 ml   Net -980 ml       Admission Weight  Weight: 104 kg (230 lb) (08/25/24 0415)    Daily Weight  08/25/24 : 104 kg (230 lb)    Physical Exam  Constitutional:       General: He is not in acute distress.     Appearance: He is obese. He is ill-appearing.   HENT:      Head: Normocephalic.      Nose: No rhinorrhea.   Eyes:      Extraocular Movements: Extraocular movements intact.      Conjunctiva/sclera: Conjunctivae normal.      Pupils: Pupils are equal, round, and reactive to light.   Cardiovascular:      Rate and Rhythm: Normal rate and regular rhythm.      Comments: Heart sounds are distant. Dorsalis pedis artery obtained via doppler.  Pulmonary:      Effort: Pulmonary effort is normal. No respiratory distress.      Breath sounds: No stridor. No wheezing or rhonchi.      Mildly decreased breath sound bilaterally at basal lung field.  Abdominal:      General: There is moderate distension.      Tenderness: There is no abdominal tenderness, no guarding.   Musculoskeletal:         General: Signs of injury present.      Cervical back: Normal range of motion.      Limited evaluation of pedal pitting edema due to wound      dressing at both legs, tense, 2+ pitting edema in BLE     - Ecchymosis at his left arm extending to elbow, moderately tender  Neurological:      General: No focal deficit present.      Mental Status: He is alert.      Comments: Oriented and without FND spontaneous to conversation.    Psychiatric:         Mood and Affect: Mood normal.         Behavior: Behavior normal.     Relevant Results  Results for orders placed or performed during the hospital encounter of 08/25/24 (from the past 24 hour(s))   Renal function panel   Result Value Ref Range    Glucose 138 (H) 74 - 99 mg/dL    Sodium 135 (L) 136 - 145 mmol/L    Potassium 2.9 (LL) 3.5 - 5.3 mmol/L    Chloride 93 (L) 98 - 107 mmol/L    Bicarbonate 29 21 - 32 mmol/L    Anion Gap 16 10 - 20 mmol/L    Urea Nitrogen 40 (H) 6 - 23 mg/dL    Creatinine 2.79 (H) 0.50 - 1.30 mg/dL    eGFR 22 (L) >60 mL/min/1.73m*2    Calcium 7.3 (L) 8.6 - 10.6 mg/dL    Phosphorus 3.6 2.5 - 4.9 mg/dL    Albumin 2.8 (L) 3.4 - 5.0 g/dL   Magnesium   Result Value Ref Range    Magnesium 1.74 1.60 - 2.40 mg/dL   CBC and Auto Differential   Result Value Ref Range    WBC 11.2 4.4 - 11.3 x10*3/uL    nRBC 0.0 0.0 - 0.0 /100 WBCs    RBC 2.38 (L) 4.50 - 5.90 x10*6/uL    Hemoglobin 6.7 (L) 13.5 - 17.5 g/dL    Hematocrit 20.9 (L) 41.0 - 52.0 %    MCV 88 80 - 100 fL    MCH 28.2 26.0 - 34.0 pg    MCHC 32.1 32.0 - 36.0 g/dL    RDW 15.2 (H) 11.5 - 14.5 %    Platelets 247 150 - 450 x10*3/uL    Neutrophils % 82.5 40.0 - 80.0 %    Immature Granulocytes %, Automated 0.8 0.0 - 0.9 %    Lymphocytes % 7.5 13.0 - 44.0 %    Monocytes % 8.2 2.0 - 10.0 %    Eosinophils % 0.9 0.0 - 6.0 %    Basophils % 0.1 0.0 - 2.0 %    Neutrophils Absolute 9.20 (H) 1.60 - 5.50 x10*3/uL    Immature Granulocytes  Absolute, Automated 0.09 0.00 - 0.50 x10*3/uL    Lymphocytes Absolute 0.84 0.80 - 3.00 x10*3/uL    Monocytes Absolute 0.91 (H) 0.05 - 0.80 x10*3/uL    Eosinophils Absolute 0.10 0.00 - 0.40 x10*3/uL    Basophils Absolute 0.01 0.00 - 0.10 x10*3/uL      Scheduled medications  [Held by provider] enoxaparin, 30 mg, subcutaneous, q24h  folic acid, 1 mg, oral, Daily  hydrALAZINE, 10 mg, oral, TID  isosorbide dinitrate, 10 mg, oral, TID  multivitamin with minerals, 1 tablet, oral, Daily  pantoprazole, 40 mg, intravenous, q12h CORNELIO  perflutren protein A microsphere, 0.5 mL, intravenous, Once in imaging  potassium chloride, 20 mEq, intravenous, q4h  ramelteon, 8 mg, oral, Nightly  sulfur hexafluoride microsphr, 2 mL, intravenous, Once in imaging  thiamine, 100 mg, oral, Daily    Continuous medications     PRN medications  PRN medications: acetaminophen, oxyCODONE, polyethylene glycol, white petrolatum     Assessment/Plan    Dinesh Mccall is a 79 year old man with unknown medical history presenting for fall with laceration in apparent acute decompensated heart failure of unclear etiology. He has severe bilateral LE edema with chronic skin changes and L dorsal mid foot cellulitis likely secondary to ulceration per CT read. Patient has signs via EMS, ED, primary team concerning for failure to thrive and social isolation. He is admitted for stabilization and work up of his cardiopulmonary status and cellulitis infection. He will need help from social work and likely rehab given long period of deconditioning. He had significant output on lasix gtt with improvement to dyspnea and BLE edema, now on 2 LNC. Will temporarily discontinue lasix gtt due to serum Cr rising to 3.08 today and correct hypokalemia from probably diuretic-induced.     # Acute Decompensated Heart Failure, unclear etiology with Right Bundle Branch Block, bilateral pleural effusion, bilateral LE edema - HF with reduced EF (unclear etiology) with suspected PHT  (type 2)  Patient reports no history of heart failure or cardiac event. No history of chest pain or heart attack. Some concern for reliability given unclear social status. His history and presentation is consistent with acute decompensated heart failure of unknown etiology. BNP >2000. TTE 8/26/2024: LVEF 15-20%, global hypokinesia of LV, elevated mean LAP, moderate MR, moderate/severe TR, moderately enlarged RV, mild/moderately elevated PAP. Admission weight 230lb. A1C 6.6%; LDL-C 36. HIV and TSH WNL.   Differential diagnosis of etiologies of HFrEF    - Ischemic CM, given his age group, family history of MI and heart failure, and his EKG of RBBB and 50 pack year smoking history     - Alcohol-induced CM? (History of drinking)    - Post-viral myocarditis?    - Amyloidosis?   :: Diuresis: IV lasix drip 10 mg/hr - I/O negative ~1.5 L over 6 days  :: C/w hydralazine & isordil for HFrEF 8/29/2024-now  Plan   - continue O2 cannula 2 LPM, keep SpO2 >94%  - Hold gtt lasix due to worsening Cr and hypokalemia    Will resume IV bolus lasix after normal K and Cr return to baseline     (~2.2-2.5 mg/dL)  - Cardiac stress MRI deferred due to hypervolemia and Hb<9  - plan to start GDMT if stable serum Cr + euvolemia achieved     # Left lower extremity infection, likely cellulitis per CT  Patient has LE edema bilaterally with more redness on the left. No pus noted. XR L Foot was suspicious for soft tissue gases. There is an ulceration on the anterior ankle but no open wound or evidence of pus. Culture were not taken prior to starting antibiotics. Follow up CT negative for deep space infection or osteo.   Antibiotics    - Vanc + Zosyn 8/25/2024 --> Vanc + ceftriaxone 8/25-8/26  Switched to oral antibiotics of bactrim 800 mg bid (since 8/26/24 - now). U/S Doppler DVT: no evidence of acute DVT both legs   - completed Bactrim (800) 2 tabs BID plan 5 days in total     # Mechanical Post fall   # Upper GI bleeding (non-variceal; suspected  ulcer-related disease from chronic ibuprofen (advil) use) with anemia (Hb 7.2-7.3 --> 6.6)    - Ddx could have ACD/EMERY, anemia of CKD or dilutional anemia  CT Head negative. Patient complaining of R hip swelling this afternoon. Denies pain, but hyperventilated on palpation. Hemoglobin 8.9 AM-> 7.2 PM -> 7.0-7.5 8/30.   :: On 8/28 - notified of new episode of melena this morning.   - s/p 1 stitch for forehead laceration notable blood loss  - F/u Right Hip XR - severe osteoporosis of both hip (R>L)  - Serum iron 28, %saturation 8%, TIBC 353, ferritin 40   - corrected reticulocyte count 2.2%, index 1.15 (Ret-Hb 23 - low)     --> likely hypoproliferative anemia  :: Total PRC transfusion 2 units (8/28/24, 8/31/24) - Hb 6.9-8, having ecchymosis left arm, and admitted to have melena one episode/day.  Plan  - F/U CBC, keep Hb >8 g/dL due to GIB  - s/p IV pantoprazole 80 mg IV, c/w 40 mg IV bid  - GI deferred EGD until cardiac condition stable  - PRC transfusion due to anemia today 1 U - will F/U CBC post-transfusion     # KIA vs CKD (Cr 2.2, no prior Cr level) suspected from cardiorenal syndrome  # Diuretic-induced hypokalemia (Furosemide)  Possibly pre-renal related to ADHF presentation. High protein in urine, consider nephrotic syndrome, intrinsic injury in context of ongoing ibuprofen use. Given poor cardiac function, cardiorenal syndrome could also contribute to his serum Cr rising. F/U Cr 2.2 -> 2.4-2.7 -> 3.08.  - urine elytes: urine Na <10, urine Cr 82.3, Urine K/Cr 60  Plan  - Suspected overdiuresis (worsening Cr after negative I/O and metabolic alkalosis with hypokalemia) - will hold gtt lasix for now and F/U RFP.  - Avoid nephrotoxic medications  - Monitoring of RFP twice daily  - correct hypokalemia (keep K >4, Mg>2 due to long QTc)     # Alcohol use  Patient mentioned multiple shots a day to help him sleep, later said one one shot with soda. He endorses drinking to drowsiness every night. Elevated alk phos.  -  CIWA protocol discontinued on 8/27/2024     # Insomnia  :: on Melatonin 3 -> 6 mg po at night --> still have trouble with sleeping and due to Qtc prolongation - trazodone, mirtazapine not suggested  Plan : switch to ramelteon (8 mg) 1 tab nightly           Will use add-on melatonin if not improving his sleep    # Ecchymosis left arm  :: reported to be due to his overstretching of his left arm several days ago (not confirmed if before or after the day he was given lovenox) tender and warm. Ecchymosis present. Left elbow painful when moving but no tenderness at joint line.  Plan: - pain control: tylenol (625 mg) prn q 6 hr, oxy 5 prn q 8 h           - on ace wrap, cold compression           - will follow his clinical of ecchymosis, CBC    # Diarrhea with UGIB (8/28/2024) - watery, no pus/mucus/blood  - no signs of abdominal pain, N/V  - Differential diagnosis: drug-induced (bactrim, furosemide)  - improved 8/29/2024     # Disposition: SNF/acute rehab facility  Patient likely not safe to go home for now. Will need social work and APS check on his house.     P: Be careful given ADHF  E: PRN  N: cardiac diet  GI PPX: Pantoprazole IV for treatment of UGIB  DVT PPX: Lovenox held due to UGIB  ABX: Bactrim  NOK: Skyla Kincaid (Sister) [phone] or [phone]     Ziggy Webb MD  PGY-1, Internal Medicine/Medical Genetics

## 2024-09-03 LAB
ALBUMIN SERPL BCP-MCNC: 3 G/DL (ref 3.4–5)
ALBUMIN SERPL BCP-MCNC: 3.1 G/DL (ref 3.4–5)
ANION GAP SERPL CALC-SCNC: 14 MMOL/L (ref 10–20)
ANION GAP SERPL CALC-SCNC: 17 MMOL/L (ref 10–20)
BUN SERPL-MCNC: 40 MG/DL (ref 6–23)
BUN SERPL-MCNC: 41 MG/DL (ref 6–23)
CALCIUM SERPL-MCNC: 8 MG/DL (ref 8.6–10.6)
CALCIUM SERPL-MCNC: 8.5 MG/DL (ref 8.6–10.6)
CHLORIDE SERPL-SCNC: 93 MMOL/L (ref 98–107)
CHLORIDE SERPL-SCNC: 94 MMOL/L (ref 98–107)
CO2 SERPL-SCNC: 29 MMOL/L (ref 21–32)
CO2 SERPL-SCNC: 32 MMOL/L (ref 21–32)
CREAT SERPL-MCNC: 2.76 MG/DL (ref 0.5–1.3)
CREAT SERPL-MCNC: 2.82 MG/DL (ref 0.5–1.3)
EGFRCR SERPLBLD CKD-EPI 2021: 22 ML/MIN/1.73M*2
EGFRCR SERPLBLD CKD-EPI 2021: 23 ML/MIN/1.73M*2
GLUCOSE SERPL-MCNC: 141 MG/DL (ref 74–99)
GLUCOSE SERPL-MCNC: 167 MG/DL (ref 74–99)
MAGNESIUM SERPL-MCNC: 2.22 MG/DL (ref 1.6–2.4)
MAGNESIUM SERPL-MCNC: 2.34 MG/DL (ref 1.6–2.4)
PHOSPHATE SERPL-MCNC: 4.3 MG/DL (ref 2.5–4.9)
PHOSPHATE SERPL-MCNC: 4.3 MG/DL (ref 2.5–4.9)
POTASSIUM SERPL-SCNC: 3.3 MMOL/L (ref 3.5–5.3)
POTASSIUM SERPL-SCNC: 3.5 MMOL/L (ref 3.5–5.3)
SODIUM SERPL-SCNC: 136 MMOL/L (ref 136–145)
SODIUM SERPL-SCNC: 136 MMOL/L (ref 136–145)

## 2024-09-03 PROCEDURE — 82565 ASSAY OF CREATININE: CPT

## 2024-09-03 PROCEDURE — 2500000002 HC RX 250 W HCPCS SELF ADMINISTERED DRUGS (ALT 637 FOR MEDICARE OP, ALT 636 FOR OP/ED)

## 2024-09-03 PROCEDURE — 36415 COLL VENOUS BLD VENIPUNCTURE: CPT

## 2024-09-03 PROCEDURE — 2500000001 HC RX 250 WO HCPCS SELF ADMINISTERED DRUGS (ALT 637 FOR MEDICARE OP)

## 2024-09-03 PROCEDURE — 2500000004 HC RX 250 GENERAL PHARMACY W/ HCPCS (ALT 636 FOR OP/ED)

## 2024-09-03 PROCEDURE — 99233 SBSQ HOSP IP/OBS HIGH 50: CPT | Performed by: NURSE PRACTITIONER

## 2024-09-03 PROCEDURE — 71045 X-RAY EXAM CHEST 1 VIEW: CPT | Performed by: RADIOLOGY

## 2024-09-03 PROCEDURE — 2060000001 HC INTERMEDIATE ICU ROOM DAILY

## 2024-09-03 PROCEDURE — 83735 ASSAY OF MAGNESIUM: CPT

## 2024-09-03 PROCEDURE — 99233 SBSQ HOSP IP/OBS HIGH 50: CPT

## 2024-09-03 PROCEDURE — 84597 ASSAY OF VITAMIN K: CPT

## 2024-09-03 PROCEDURE — 80069 RENAL FUNCTION PANEL: CPT

## 2024-09-03 PROCEDURE — 97530 THERAPEUTIC ACTIVITIES: CPT | Mod: GP,CQ

## 2024-09-03 PROCEDURE — 99232 SBSQ HOSP IP/OBS MODERATE 35: CPT

## 2024-09-03 RX ORDER — POTASSIUM CHLORIDE 1.5 G/1.58G
40 POWDER, FOR SOLUTION ORAL ONCE
Status: COMPLETED | OUTPATIENT
Start: 2024-09-03 | End: 2024-09-04

## 2024-09-03 RX ORDER — POTASSIUM CHLORIDE 20 MEQ/1
20 TABLET, EXTENDED RELEASE ORAL ONCE
Status: COMPLETED | OUTPATIENT
Start: 2024-09-03 | End: 2024-09-03

## 2024-09-03 RX ORDER — POTASSIUM CHLORIDE 14.9 MG/ML
20 INJECTION INTRAVENOUS
Status: COMPLETED | OUTPATIENT
Start: 2024-09-03 | End: 2024-09-04

## 2024-09-03 RX ORDER — POTASSIUM CHLORIDE 20 MEQ/1
40 TABLET, EXTENDED RELEASE ORAL ONCE
Status: COMPLETED | OUTPATIENT
Start: 2024-09-03 | End: 2024-09-03

## 2024-09-03 RX ORDER — METOLAZONE 5 MG/1
5 TABLET ORAL ONCE
Status: COMPLETED | OUTPATIENT
Start: 2024-09-03 | End: 2024-09-03

## 2024-09-03 ASSESSMENT — COGNITIVE AND FUNCTIONAL STATUS - GENERAL
MOVING TO AND FROM BED TO CHAIR: A LOT
DRESSING REGULAR UPPER BODY CLOTHING: A LITTLE
EATING MEALS: A LITTLE
PERSONAL GROOMING: A LITTLE
STANDING UP FROM CHAIR USING ARMS: A LOT
DRESSING REGULAR UPPER BODY CLOTHING: A LITTLE
DRESSING REGULAR LOWER BODY CLOTHING: A LOT
MOBILITY SCORE: 14
WALKING IN HOSPITAL ROOM: TOTAL
MOVING FROM LYING ON BACK TO SITTING ON SIDE OF FLAT BED WITH BEDRAILS: A LITTLE
CLIMB 3 TO 5 STEPS WITH RAILING: TOTAL
DAILY ACTIVITIY SCORE: 15
TOILETING: A LOT
TOILETING: A LOT
CLIMB 3 TO 5 STEPS WITH RAILING: TOTAL
PERSONAL GROOMING: A LITTLE
TURNING FROM BACK TO SIDE WHILE IN FLAT BAD: A LITTLE
MOVING FROM LYING ON BACK TO SITTING ON SIDE OF FLAT BED WITH BEDRAILS: A LOT
STANDING UP FROM CHAIR USING ARMS: A LOT
DAILY ACTIVITIY SCORE: 15
MOVING TO AND FROM BED TO CHAIR: A LOT
MOVING FROM LYING ON BACK TO SITTING ON SIDE OF FLAT BED WITH BEDRAILS: A LOT
TURNING FROM BACK TO SIDE WHILE IN FLAT BAD: A LOT
MOBILITY SCORE: 10
WALKING IN HOSPITAL ROOM: TOTAL
MOVING TO AND FROM BED TO CHAIR: A LOT
CLIMB 3 TO 5 STEPS WITH RAILING: TOTAL
WALKING IN HOSPITAL ROOM: A LITTLE
MOBILITY SCORE: 10
DRESSING REGULAR LOWER BODY CLOTHING: A LOT
TURNING FROM BACK TO SIDE WHILE IN FLAT BAD: A LOT
HELP NEEDED FOR BATHING: A LOT
EATING MEALS: A LITTLE
HELP NEEDED FOR BATHING: A LOT
STANDING UP FROM CHAIR USING ARMS: A LOT

## 2024-09-03 ASSESSMENT — PAIN SCALES - GENERAL
PAINLEVEL_OUTOF10: 4
PAINLEVEL_OUTOF10: 0 - NO PAIN
PAINLEVEL_OUTOF10: 3
PAINLEVEL_OUTOF10: 8
PAINLEVEL_OUTOF10: 6

## 2024-09-03 ASSESSMENT — PAIN - FUNCTIONAL ASSESSMENT
PAIN_FUNCTIONAL_ASSESSMENT: 0-10

## 2024-09-03 ASSESSMENT — PAIN DESCRIPTION - ORIENTATION: ORIENTATION: LEFT

## 2024-09-03 ASSESSMENT — PAIN DESCRIPTION - LOCATION
LOCATION: ARM
LOCATION: ARM

## 2024-09-03 NOTE — PROGRESS NOTES
Internal Medicine Progress Note        Subjective   Subjective   Pt was seen at the bedside. On 4L NC. States he has not had melenotic bowel movements for the past few days. He also states that his breathing and leg swelling has significantly improved since admission.    Objective     Vitals: I/O:   Vitals:    09/03/24 1709   BP: 123/71   Pulse: 92   Resp: 17   Temp: 36.6 °C (97.9 °F)   SpO2: 95%      24hr Min/Max:  Temp  Min: 36.6 °C (97.9 °F)  Max: 37.3 °C (99.2 °F)  Pulse  Min: 92  Max: 96  BP  Min: 115/70  Max: 130/80  Resp  Min: 17  Max: 18  SpO2  Min: 90 %  Max: 95 %   Intake/Output Summary (Last 24 hours) at 9/3/2024 1822  Last data filed at 9/3/2024 1727  Gross per 24 hour   Intake 1379.75 ml   Output 2000 ml   Net -620.25 ml         Physical Exam:  General: Awake, alert, orientated. not in any acute distress  HEENT: no scleral icterus or conjunctivitis  Chest: CTAB, normal respiratory effort, no wheezes or crackles, on 4L NC supplemental oxygen  Cardiac: Soft heart sounds, RRR, no Murmurs or rubs.   Abdomen: Soft, Distended, NT  EXT: 1+ peripheral edema bilaterally, no asymmetry noted. Chronic skin changes bilaterally. Peeling of skin on left leg. ACE wrap on left arm with notable edema.   MSK: No focal joint swelling noted  Skin: Ecchymosis of left arm, most prominent around elbow and forearm.   Neuro: AOx4, moving all limbs spontaneously, follows commands  Psych: Coherent thought process, appropriate mood and affect    General Chemistry Labs    Results from last 72 hours   Lab Units 09/03/24  0821 09/02/24  2145 09/02/24  1415   GLUCOSE mg/dL 167* 211* 133*   SODIUM mmol/L 136 135* 134*   POTASSIUM mmol/L 3.5 4.0 4.2   CHLORIDE mmol/L 94* 95* 93*   CO2 mmol/L 29 27 28   BUN mg/dL 41* 42* 40*   CREATININE mg/dL 2.82* 2.77* 2.82*   ANION GAP mmol/L 17 17 17   EGFR mL/min/1.73m*2 22* 23* 22*   CALCIUM mg/dL 8.0* 7.6* 7.8*   PHOSPHORUS mg/dL 4.3 3.3 3.3   MAGNESIUM mg/dL 2.34 1.93 2.10   ALBUMIN g/dL 3.0*  "2.9* 3.3*      CBC  Results from last 72 hours   Lab Units 09/02/24 2145 09/02/24  1415 09/02/24  0903 09/01/24  0727   WBC AUTO x10*3/uL 12.3* 13.0* 11.2 11.2   HEMOGLOBIN g/dL 7.6* 7.8* 6.7* 7.9*   HEMATOCRIT % 24.5* 25.9* 20.9* 26.3*   MCV fL 92 96 88 94   MCH pg 28.6 28.8 28.2 28.3   MCHC g/dL 31.0* 30.1* 32.1 30.0*   RDW % 15.3* 15.7* 15.2* 15.7*   PLATELETS AUTO x10*3/uL 236 283 247 254   NEUTROS PCT AUTO % 84.8  --  82.5 82.2   LYMPHS PCT AUTO % 6.5  --  7.5 7.8   MONOS PCT AUTO % 7.2  --  8.2 7.4   EOS PCT AUTO % 0.2  --  0.9 1.5     Coagulation Labs  Results from last 72 hours   Lab Units 09/02/24 2145 09/02/24 1415   INR  1.4* 1.4*   PROTIME seconds 16.2* 15.7*   APTT seconds 73* 72*     Cardiac Labs      Micro/ID:   No results found for: \"URINECULTURE\", \"BLOODCULT\", \"CSFCULTSMEAR\"          Scheduled Medications:  PRN Medications:  Continuous Medications:   [Held by provider] enoxaparin, 30 mg, subcutaneous, q24h  folic acid, 1 mg, oral, Daily  hydrALAZINE, 10 mg, oral, TID  isosorbide dinitrate, 10 mg, oral, TID  multivitamin with minerals, 1 tablet, oral, Daily  pantoprazole, 40 mg, intravenous, q12h CORNELIO  perflutren protein A microsphere, 0.5 mL, intravenous, Once in imaging  ramelteon, 8 mg, oral, Nightly  sulfur hexafluoride microsphr, 2 mL, intravenous, Once in imaging  thiamine, 100 mg, oral, Daily     PRN medications: acetaminophen, oxyCODONE, polyethylene glycol, white petrolatum furosemide, 20 mg/hr, Last Rate: 20 mg/hr (09/03/24 1257)         Summary of key imaging results from the last 24 hours:    XR chest 1 view    Result Date: 9/3/2024  Interpreted By:  Morales Lynn, STUDY: XR CHEST 1 VIEW; 9/3/2024 12:05 pm   INDICATION: Signs/Symptoms:eval pleural effusions.   COMPARISON: 08/25/2024.   ACCESSION NUMBER(S): [id number]   ORDERING CLINICIAN: JC MARTINEZ   FINDINGS:     CARDIOMEDIASTINAL SILHOUETTE: Cardiomegaly versus pericardial effusion.   LUNGS: Slight interval worsening in " right-sided edema with perihilar edema. No pneumothorax.   ABDOMEN: No remarkable upper abdominal findings.   BONES: No acute osseous changes.       1.  Question slight interval worsening in right-sided effusion/edema. Correlate with underlying ascites/subphrenic process. Cardiomegaly versus pericardial effusion and correlate with fluid status.     Signed by: Morales Lynn 9/3/2024 3:06 PM Dictation workstation:   [id number]         Assessment and Plan    Dinesh Mccall is a 79 year old man with unknown medical history presenting for fall with in apparent acute decompensated heart failure of unclear etiology with mild respiratory distress. BNP was 2182, and TTE revealed 15-20%, global hypokinesia of LV, elevated mean LAP, moderate MR, moderate/severe TR, moderately enlarged RV, mild/moderately elevated PAP. He also had severe bilateral LE edema with chronic skin changes and L dorsal mid foot cellulitis likely secondary to ulceration per CT read for which he completed a course of abx. For the ADHF, he was started on a lasix gtt with significant improvement to his dyspnea and BLE /edema, now on 4 L NC. He is currently on lasix drip and On IV pantoprazole bid due to UGIB. Transferred to Caridiology service for continued management of decompensated heart failure.       #Acute Decompensated Systolic Heart Failure (EF 15-20%), unclear etiology  #RBBB  #Suspected Pulmonary HTN Type 2  -BNP 2182  -Admission weight 230lb, Current weight 210lbs. Dry weight unknown  -CXR 9/3: Questionable slight interval worsening in right-sided effusion/edema.   -TTE 8/26: LVEF 15-20%, global hypokinesia of LV, elevated mean LAP, moderate MR, moderate/severe TR, moderately enlarged RV, mild/moderately elevated PAP   -Liver US 8/25 showed moderate ascites with hepatomegaly  - Cardiology was consulted in past and recommended cardiac MRI stress. Thought etiology is related to post viral syndrome vs ischemic cardiomyopathy. Unable to get  cardiac MRI due to Hgb < 9 since admission  Plan:  - Continue Lasix gtt 20mg/hr    -strict I/Os   -RFP BID while being diuressed  -GDMT:            - BB: defer for now iso ADHF            - ACE/ARB/ARNI: defer for now iso KIA            - MRA: defer for now iso KIA            - SGLT2-inhibitor: defer for now. Iso KIA   - Continue Hydralazine 10 mg PO and isosorbide dinitrate 10mg TID.  Goal BP < 140  - Ischemic evaluation deferred until euvolemic       #Melena, improving  #Anemia, (Hgb 7-8 while in hospital),   -anemia etiology likely iron deficiency anemia with compounded GI bleed.  -GI consulted on admission but deferred EGD due to decompensated heart failure   - Reports taking 6-8 Advil daily for a year  - Iron 28, Transferrin 263, TIBC sat 8%, ferritin 40 > Iron deficiency  -Haptoglobin 167 and   - last melena noted on 9/2, bowel movement without melena on 9/3  - received pRBCs on 9/2   - total received 3 units pRBCs during this admission.   Plan:  - holding lovenox due to GI bleed  - transfuse for Hgb>7 due to GI bleed  - Continue IV pantoprazole 40mg BID  - GI consult in morning since patient is clinically improving in regards to decompensated heart failure.   - consider IV iron for iron deficiency    #KIA, improving  - etiology presumed cardiorenal due to decompensated heart failure.   - No baseline Cr available   - Admission Cr. 2.2, peak 3.08, now at 2.82  -urine lytes 8/25: urine Na <10, urine Cr 82.3, Urine K/Cr 60   Plan:  -Continue diuresis with lasix gtt   -avoid nephrotoxic agents  -CTM creatinine  -RFP BID while being diuresed  -replete electrolytes for K>4 and Mg>2    #LUE edema and ecchymosis  - no significant trauma  - new edema and ecchymosis occurred while in hospital  - mild pain with movement at the elbow  - PT 16.2, INR 1.4, PTT 73  Plan:  -Doppler US of LUE pending  -Ace band and cold compress  -Vitamin K levels pending  -pain regimen: Tylenol 650 mg prn and Oxycodone 5mg  prn      #Insomnia  -was on Melatonin 6 mg nighty with continued difficulty sleeping  - Qtc prolongation noted on EKG  - trazodone, mirtazapine not suggested.   -Switched to ramelteon with improvement in sleep quality  Plan:  - continue ramelteon 8mg nightly    #LLE cellulitis  -Unilateral erythema on LLE. No pus noted.   - X ray of L foot concerning for soft tissue gas  - doppler LE negative for DVT  - CT foot negative for deep space infection or osteomyelitis  - was treated with Vanc and Zosyn and then Vanc and Ceftriaxone from 8/24-8/26  -switched to Bactrim 800mg on 8/26 for a 5 day course, completed    # Disposition: SNF/acute rehab facility  Patient likely not safe to go home for now. Will need social work and APS check on his house.    Medical Check List   FEN  -Fluids:  PRN cautiously given HFrEF 15-20%  -Electrolytes: PRN, with goals of Mg >2, K>4  -Nutrition: Cardiac diet  Prophylaxis:  -DVT ppx: Held iso melena  -GI ppx/Bowel care: IV Pantoprazole 40mg BID   -Abx: None  -Pain regimen: Tylenol 650mg and Oxycodone 5mg prn  Hardware:  -Drains: Garcia  -Lines: PIV  Social:  -Code: Full Code  -NOK/Surrogate Decision Maker: Skyla Kincaid (Sister) [phone] or [phone]      Patient to be seen and staffed with attending provider in the morning.     Martín Jackson MD  Internal Medicine PGY-1    Disclaimer: Documentation completed with the information available at the time of input. The times in the chart may not be reflective of actual patient care times, interventions, or procedures. Documentation occurs after the physical care of the patient.

## 2024-09-03 NOTE — CARE PLAN
Problem: Skin  Goal: Prevent/manage excess moisture  Outcome: Progressing     Problem: Skin  Goal: Prevent/minimize sheer/friction injuries  Outcome: Progressing     Problem: Skin  Goal: Promote/optimize nutrition  Outcome: Progressing     Problem: Respiratory  Goal: Verbalize decreased shortness of breath this shift  Outcome: Progressing     Problem: Respiratory  Goal: Patent airway maintained this shift  Outcome: Progressing     Problem: Safety - Adult  Goal: Free from fall injury  Outcome: Progressing     Problem: Pain - Adult  Goal: Verbalizes/displays adequate comfort level or baseline comfort level  Outcome: Progressing   The patient's goals for the shift include  No falls or injuries during shift    The clinical goals for the shift include pt will remain saafe during shift    Over the shift, the patient did not make progress toward the following goals. Barriers to progression include none. Recommendations to address these barriers include assess patient during rounding.

## 2024-09-03 NOTE — PROGRESS NOTES
Transitional Care Coordination Progress Note:  Patient discussed during interdisciplinary rounds.  Team members present: KATHI AUGUST  Plan per Medical/Surgical team: Per medical team plan to continue diuresing pt with IV Lasix.  Payer: [employer]  Status: Inpatient  Discharge disposition: Skilled nursing facility pending FOC from sister Skyla.   Potential Barriers: none  ADOD: 9/6  SW is following for assistance with arranging SNF placement. Care coordinator will continue to follow for discharge planning needs.     Dallas Vicente RN  Transitional Care Coordinator (TCC)  [phone] or [phone]

## 2024-09-03 NOTE — CARE PLAN
The patient's goals for the shift include will not have bloody stools throughout the shift    The clinical goals for the shift include pt will remain safe during shift    Problem: Skin  Goal: Decreased wound size/increased tissue granulation at next dressing change  Flowsheets (Taken 9/3/2024 1637)  Decreased wound size/increased tissue granulation at next dressing change:   Promote sleep for wound healing   Protective dressings over bony prominences  Goal: Participates in plan/prevention/treatment measures  Flowsheets (Taken 9/3/2024 1637)  Participates in plan/prevention/treatment measures:   Discuss with provider PT/OT consult   Elevate heels  Goal: Prevent/manage excess moisture  Flowsheets (Taken 9/3/2024 1637)  Prevent/manage excess moisture:   Cleanse incontinence/protect with barrier cream   Follow provider orders for dressing changes   Moisturize dry skin  Goal: Prevent/minimize sheer/friction injuries  Flowsheets (Taken 9/3/2024 1637)  Prevent/minimize sheer/friction injuries:   Complete micro-shifts as needed if patient unable. Adjust patient position to relieve pressure points, not a full turn   Turn/reposition every 2 hours/use positioning/transfer devices   Use pull sheet  Goal: Promote/optimize nutrition  Flowsheets (Taken 9/3/2024 1637)  Promote/optimize nutrition: Monitor/record intake including meals  Goal: Promote skin healing  Flowsheets (Taken 9/3/2024 1637)  Promote skin healing:   Assess skin/pad under line(s)/device(s)   Turn/reposition every 2 hours/use positioning/transfer devices     Over the shift, the patient did not make progress toward the following goals. Barriers to progression include pain on left arm. Recommendations to address these barriers include assist with transfer and q2 turns, pain management.

## 2024-09-03 NOTE — PROGRESS NOTES
Service Transfer Note   --------------------------------------------------------------------------  Yohan Dykes is a 79 y.o. male without past medical history presenting with fall while using office chair with wheels without loss of consciousness. He reported having SOB and difficulties sleeping due to discomfort from distended abdomen. Also had history of orthopnea and PND with some productive cough. He had an oximeter at home, which reported SpO2 at 80s when lying flat. He denied history of chest pain or palpitation. He had been taking Advil (ibuprofen-acetaminophen) q4-6h and mucinex daily prior to hospital visit.     At the ED, he had mild hypertension 144/96, with mild respiratory distress and RR of 20 with poor oxygenation. He was put on 2L NC. Labs are notable for leukocytosis to 16.5, hemoglobin 7.3-8.9 (with no known baseline), Cr 2.20 (no known baseline), BUN 68, Alk Phos 180, CRP 5.5, ESR 34, BNP >2000, A1c 6.1, UA notable for proteinuria to 30. His CXR demonstrating bilateral pleural effusions R>L. No cultures were collected. CT head negative and he received 1 stitch for his head laceration. His left foot XR suspicious for soft tissue gases. CT foot showed diffuse soft tissue swelling, likely infection/cellulitis without osteomyelitis, and he was initially put on Zosyn and Vancomycin.     He was admitted on 8/25/2024 for the treatment of acute decompensated heart failure, with KIA, normocytic anemia, and cellulitis of left leg. His antibiotics were deescalated to IV ceftriaxone/Vancomycin and oral bactrim DS bid for 5 days. Echocardiogram revealed severe LV dysfunction with LVEF 15-20% with global hypokinesia and moderately elevated PAP, likely suggestive of HFrEF with secondary PHT. Liver US doppler revealed moderate ascites with hepatomegaly. Vascular US was negative. He was treated with IV diuretics for 7 days and his urine net urine output was negative for 81661 mL over 7 days. His current diuretics  regimen is IV lasix 10 mg/h ggt and metolazone 5 mg for today. Ischemic eval was deferred due to his HFrEF not adequately diuresed and anemia.    His hospital course was complicated by UGIB with decrease of Hb to 6.6-6.9 g/dL. He was transfused with a total 3 units of packed RBCs, withheld for lovenox prophylaxis, and given IV pantoprazole 40 mg bid with significant reduction in his melena. His latest Hb was 7.5 g/dL. GI was consulted but EGD was deferred due to concerns of HFrEF. He also had new ecchymosis at his left arm, which he is currently on ace wrap and cold compression. Coagulation screen showed prolonged PT 15.7 s (INR 1.4) and PTT 73 s despite discontinuation of lovenox 5 days ago. He will be transferred to the cardiology [facility] service for further evaluation and management of heart failure.     Subjective   He is hemodynamically stable. Still have ongoing shortness of breath but can sleep better last night. Denied chest pain, fever. Tolerated 1 unit of PRBC transfusion well last night. Still having left arm pain but is more controlled than yesterday. Have 2 bowel movement yesterday without black stool. No acute event overnight.    Objective     Last Recorded Vitals  /80 (BP Location: Right arm, Patient Position: Lying)   Pulse 96   Temp 36.6 °C (97.9 °F) (Temporal)   Resp 18   Wt 95.5 kg (210 lb 8.6 oz)   SpO2 95%   Intake/Output last 3 Shifts:    Intake/Output Summary (Last 24 hours) at 9/3/2024 1444  Last data filed at 9/3/2024 1355  Gross per 24 hour   Intake 1139.75 ml   Output 900 ml   Net 239.75 ml       Admission Weight  Weight: 104 kg (230 lb) (08/25/24 0415)    Daily Weight  09/03/24 : 95.5 kg (210 lb 8.6 oz)    Image Results  ECG 12 Lead  Sinus tachycardia with Premature atrial complexes with Aberrant conduction  Right bundle branch block  Abnormal ECG  When compared with ECG of 30-AUG-2024 10:33,  Fusion complexes are no longer Present  Aberrant conduction is now Present  T  wave inversion no longer evident in Lateral leads      Physical Exam  General: Obese male sitting up in bed, 4L nc, oriented, alert  Skin:  warm and dry  HEENT: EOMI. MMM, moderately pale conjunctiva  Cardiovascular: RRR. JVD to earlobe at 60 degrees, distant heart sounds, no S1/S2 murmurs  Respiratory:  reduced A/E bibasilar R>L, crackles both lungs  Gastrointestinal: distended with fluid thrill positive, non tender, normal bowel sound, no palpable mass  Genitourinary: purwick with yellow urine  Extremities: 2-3+ pitting edema up to mid chest; ACE wrap to upper LUE, pitting edema 1-2+ both legs  Neurological: no gross focal deficits  Psychiatric: calm and cooperative  Musculoskeletal:         General: Signs of injury present.      Cervical back: Normal range of motion.      Limited evaluation of pedal pitting edema due to wound      dressing at both legs, tense, 2+ pitting edema in BLE     - Ecchymosis at his left arm extending to elbow, moderately tender    Relevant Results  Results for orders placed or performed during the hospital encounter of 08/25/24 (from the past 96 hour(s))   Renal function panel   Result Value Ref Range    Glucose 106 (H) 74 - 99 mg/dL    Sodium 138 136 - 145 mmol/L    Potassium 3.4 (L) 3.5 - 5.3 mmol/L    Chloride 102 98 - 107 mmol/L    Bicarbonate 24 21 - 32 mmol/L    Anion Gap 15 10 - 20 mmol/L    Urea Nitrogen 51 (H) 6 - 23 mg/dL    Creatinine 2.70 (H) 0.50 - 1.30 mg/dL    eGFR 23 (L) >60 mL/min/1.73m*2    Calcium 7.6 (L) 8.6 - 10.6 mg/dL    Phosphorus 4.1 2.5 - 4.9 mg/dL    Albumin 2.8 (L) 3.4 - 5.0 g/dL   Magnesium   Result Value Ref Range    Magnesium 1.94 1.60 - 2.40 mg/dL   Magnesium   Result Value Ref Range    Magnesium 2.18 1.60 - 2.40 mg/dL   Type and screen   Result Value Ref Range    ABO TYPE O     Rh TYPE POS     ANTIBODY SCREEN NEG    B-type natriuretic peptide   Result Value Ref Range    BNP 2,402 (H) 0 - 99 pg/mL   CBC and Auto Differential   Result Value Ref Range    WBC  11.7 (H) 4.4 - 11.3 x10*3/uL    nRBC 0.2 (H) 0.0 - 0.0 /100 WBCs    RBC 2.42 (L) 4.50 - 5.90 x10*6/uL    Hemoglobin 6.9 (L) 13.5 - 17.5 g/dL    Hematocrit 23.3 (L) 41.0 - 52.0 %    MCV 96 80 - 100 fL    MCH 28.5 26.0 - 34.0 pg    MCHC 29.6 (L) 32.0 - 36.0 g/dL    RDW 15.9 (H) 11.5 - 14.5 %    Platelets 256 150 - 450 x10*3/uL    Neutrophils % 84.7 40.0 - 80.0 %    Immature Granulocytes %, Automated 0.9 0.0 - 0.9 %    Lymphocytes % 6.5 13.0 - 44.0 %    Monocytes % 6.4 2.0 - 10.0 %    Eosinophils % 1.3 0.0 - 6.0 %    Basophils % 0.2 0.0 - 2.0 %    Neutrophils Absolute 9.89 (H) 1.60 - 5.50 x10*3/uL    Immature Granulocytes Absolute, Automated 0.11 0.00 - 0.50 x10*3/uL    Lymphocytes Absolute 0.76 (L) 0.80 - 3.00 x10*3/uL    Monocytes Absolute 0.75 0.05 - 0.80 x10*3/uL    Eosinophils Absolute 0.15 0.00 - 0.40 x10*3/uL    Basophils Absolute 0.02 0.00 - 0.10 x10*3/uL   Renal Function Panel   Result Value Ref Range    Glucose 99 74 - 99 mg/dL    Sodium 139 136 - 145 mmol/L    Potassium 3.8 3.5 - 5.3 mmol/L    Chloride 100 98 - 107 mmol/L    Bicarbonate 27 21 - 32 mmol/L    Anion Gap 16 10 - 20 mmol/L    Urea Nitrogen 47 (H) 6 - 23 mg/dL    Creatinine 2.63 (H) 0.50 - 1.30 mg/dL    eGFR 24 (L) >60 mL/min/1.73m*2    Calcium 8.1 (L) 8.6 - 10.6 mg/dL    Phosphorus 4.3 2.5 - 4.9 mg/dL    Albumin 2.9 (L) 3.4 - 5.0 g/dL   Prepare RBC: 1 Units   Result Value Ref Range    PRODUCT CODE S4963V43     Unit Number [id number]     Unit ABO O     Unit RH POS     XM INTEP COMP     Dispense Status TR     Blood Expiration Date 9/27/2024 11:59:00 PM EDT     PRODUCT BLOOD TYPE 5100     UNIT VOLUME 350    CBC and Auto Differential   Result Value Ref Range    WBC 12.3 (H) 4.4 - 11.3 x10*3/uL    nRBC 0.0 0.0 - 0.0 /100 WBCs    RBC 2.80 (L) 4.50 - 5.90 x10*6/uL    Hemoglobin 8.0 (L) 13.5 - 17.5 g/dL    Hematocrit 25.8 (L) 41.0 - 52.0 %    MCV 92 80 - 100 fL    MCH 28.6 26.0 - 34.0 pg    MCHC 31.0 (L) 32.0 - 36.0 g/dL    RDW 15.5 (H) 11.5 - 14.5  %    Platelets 256 150 - 450 x10*3/uL    Neutrophils % 83.0 40.0 - 80.0 %    Immature Granulocytes %, Automated 1.1 (H) 0.0 - 0.9 %    Lymphocytes % 6.9 13.0 - 44.0 %    Monocytes % 7.2 2.0 - 10.0 %    Eosinophils % 1.6 0.0 - 6.0 %    Basophils % 0.2 0.0 - 2.0 %    Neutrophils Absolute 10.19 (H) 1.60 - 5.50 x10*3/uL    Immature Granulocytes Absolute, Automated 0.13 0.00 - 0.50 x10*3/uL    Lymphocytes Absolute 0.84 0.80 - 3.00 x10*3/uL    Monocytes Absolute 0.88 (H) 0.05 - 0.80 x10*3/uL    Eosinophils Absolute 0.19 0.00 - 0.40 x10*3/uL    Basophils Absolute 0.02 0.00 - 0.10 x10*3/uL   Magnesium   Result Value Ref Range    Magnesium 2.08 1.60 - 2.40 mg/dL   Renal Function Panel   Result Value Ref Range    Glucose 92 74 - 99 mg/dL    Sodium 138 136 - 145 mmol/L    Potassium 3.5 3.5 - 5.3 mmol/L    Chloride 97 (L) 98 - 107 mmol/L    Bicarbonate 24 21 - 32 mmol/L    Anion Gap 21 (H) 10 - 20 mmol/L    Urea Nitrogen 45 (H) 6 - 23 mg/dL    Creatinine 2.50 (H) 0.50 - 1.30 mg/dL    eGFR 26 (L) >60 mL/min/1.73m*2    Calcium 7.9 (L) 8.6 - 10.6 mg/dL    Phosphorus 4.1 2.5 - 4.9 mg/dL    Albumin 3.0 (L) 3.4 - 5.0 g/dL   CBC and Auto Differential   Result Value Ref Range    WBC 11.2 4.4 - 11.3 x10*3/uL    nRBC 0.0 0.0 - 0.0 /100 WBCs    RBC 2.79 (L) 4.50 - 5.90 x10*6/uL    Hemoglobin 7.9 (L) 13.5 - 17.5 g/dL    Hematocrit 26.3 (L) 41.0 - 52.0 %    MCV 94 80 - 100 fL    MCH 28.3 26.0 - 34.0 pg    MCHC 30.0 (L) 32.0 - 36.0 g/dL    RDW 15.7 (H) 11.5 - 14.5 %    Platelets 254 150 - 450 x10*3/uL    Neutrophils % 82.2 40.0 - 80.0 %    Immature Granulocytes %, Automated 0.9 0.0 - 0.9 %    Lymphocytes % 7.8 13.0 - 44.0 %    Monocytes % 7.4 2.0 - 10.0 %    Eosinophils % 1.5 0.0 - 6.0 %    Basophils % 0.2 0.0 - 2.0 %    Neutrophils Absolute 9.19 (H) 1.60 - 5.50 x10*3/uL    Immature Granulocytes Absolute, Automated 0.10 0.00 - 0.50 x10*3/uL    Lymphocytes Absolute 0.87 0.80 - 3.00 x10*3/uL    Monocytes Absolute 0.83 (H) 0.05 - 0.80 x10*3/uL     Eosinophils Absolute 0.17 0.00 - 0.40 x10*3/uL    Basophils Absolute 0.02 0.00 - 0.10 x10*3/uL   Magnesium   Result Value Ref Range    Magnesium 1.89 1.60 - 2.40 mg/dL   Renal Function Panel   Result Value Ref Range    Glucose 93 74 - 99 mg/dL    Sodium 138 136 - 145 mmol/L    Potassium 2.9 (LL) 3.5 - 5.3 mmol/L    Chloride 94 (L) 98 - 107 mmol/L    Bicarbonate 30 21 - 32 mmol/L    Anion Gap 17 mmol/L    Urea Nitrogen 49 (H) 6 - 23 mg/dL    Creatinine 3.08 (H) 0.50 - 1.30 mg/dL    eGFR 20 (L) >60 mL/min/1.73m*2    Calcium 7.8 (L) 8.6 - 10.6 mg/dL    Phosphorus 4.1 2.5 - 4.9 mg/dL    Albumin 3.0 (L) 3.4 - 5.0 g/dL   Magnesium   Result Value Ref Range    Magnesium 1.88 1.60 - 2.40 mg/dL   Renal Function Panel   Result Value Ref Range    Glucose 193 (H) 74 - 99 mg/dL    Sodium 135 (L) 136 - 145 mmol/L    Potassium 3.0 (L) 3.5 - 5.3 mmol/L    Chloride 92 (L) 98 - 107 mmol/L    Bicarbonate 31 21 - 32 mmol/L    Anion Gap 15 10 - 20 mmol/L    Urea Nitrogen 43 (H) 6 - 23 mg/dL    Creatinine 2.95 (H) 0.50 - 1.30 mg/dL    eGFR 21 (L) >60 mL/min/1.73m*2    Calcium 7.7 (L) 8.6 - 10.6 mg/dL    Phosphorus 3.7 2.5 - 4.9 mg/dL    Albumin 3.0 (L) 3.4 - 5.0 g/dL   ECG 12 Lead   Result Value Ref Range    Ventricular Rate 115 BPM    Atrial Rate 115 BPM    TN Interval 174 ms    QRS Duration 186 ms    QT Interval 394 ms    QTC Calculation(Bazett) 545 ms    P Axis -10 degrees    R Axis -27 degrees    T Axis -7 degrees    QRS Count 19 beats    Q Onset 214 ms    P Onset 127 ms    P Offset 173 ms    T Offset 411 ms    QTC Fredericia 489 ms   Renal function panel   Result Value Ref Range    Glucose 138 (H) 74 - 99 mg/dL    Sodium 135 (L) 136 - 145 mmol/L    Potassium 2.9 (LL) 3.5 - 5.3 mmol/L    Chloride 93 (L) 98 - 107 mmol/L    Bicarbonate 29 21 - 32 mmol/L    Anion Gap 16 10 - 20 mmol/L    Urea Nitrogen 40 (H) 6 - 23 mg/dL    Creatinine 2.79 (H) 0.50 - 1.30 mg/dL    eGFR 22 (L) >60 mL/min/1.73m*2    Calcium 7.3 (L) 8.6 - 10.6 mg/dL     Phosphorus 3.6 2.5 - 4.9 mg/dL    Albumin 2.8 (L) 3.4 - 5.0 g/dL   Magnesium   Result Value Ref Range    Magnesium 1.74 1.60 - 2.40 mg/dL   CBC and Auto Differential   Result Value Ref Range    WBC 11.2 4.4 - 11.3 x10*3/uL    nRBC 0.0 0.0 - 0.0 /100 WBCs    RBC 2.38 (L) 4.50 - 5.90 x10*6/uL    Hemoglobin 6.7 (L) 13.5 - 17.5 g/dL    Hematocrit 20.9 (L) 41.0 - 52.0 %    MCV 88 80 - 100 fL    MCH 28.2 26.0 - 34.0 pg    MCHC 32.1 32.0 - 36.0 g/dL    RDW 15.2 (H) 11.5 - 14.5 %    Platelets 247 150 - 450 x10*3/uL    Neutrophils % 82.5 40.0 - 80.0 %    Immature Granulocytes %, Automated 0.8 0.0 - 0.9 %    Lymphocytes % 7.5 13.0 - 44.0 %    Monocytes % 8.2 2.0 - 10.0 %    Eosinophils % 0.9 0.0 - 6.0 %    Basophils % 0.1 0.0 - 2.0 %    Neutrophils Absolute 9.20 (H) 1.60 - 5.50 x10*3/uL    Immature Granulocytes Absolute, Automated 0.09 0.00 - 0.50 x10*3/uL    Lymphocytes Absolute 0.84 0.80 - 3.00 x10*3/uL    Monocytes Absolute 0.91 (H) 0.05 - 0.80 x10*3/uL    Eosinophils Absolute 0.10 0.00 - 0.40 x10*3/uL    Basophils Absolute 0.01 0.00 - 0.10 x10*3/uL   Prepare RBC: 1 Units   Result Value Ref Range    PRODUCT CODE P4632S66     Unit Number [id number]     Unit ABO O     Unit RH POS     XM INTEP COMP     Dispense Status TR     Blood Expiration Date 9/24/2024 11:59:00 PM EDT     PRODUCT BLOOD TYPE 5100     UNIT VOLUME 350    Type and screen   Result Value Ref Range    ABO TYPE O     Rh TYPE POS     ANTIBODY SCREEN NEG    Renal function panel   Result Value Ref Range    Glucose 133 (H) 74 - 99 mg/dL    Sodium 134 (L) 136 - 145 mmol/L    Potassium 4.2 3.5 - 5.3 mmol/L    Chloride 93 (L) 98 - 107 mmol/L    Bicarbonate 28 21 - 32 mmol/L    Anion Gap 17 10 - 20 mmol/L    Urea Nitrogen 40 (H) 6 - 23 mg/dL    Creatinine 2.82 (H) 0.50 - 1.30 mg/dL    eGFR 22 (L) >60 mL/min/1.73m*2    Calcium 7.8 (L) 8.6 - 10.6 mg/dL    Phosphorus 3.3 2.5 - 4.9 mg/dL    Albumin 3.3 (L) 3.4 - 5.0 g/dL   Magnesium   Result Value Ref Range    Magnesium  2.10 1.60 - 2.40 mg/dL   Coagulation Screen   Result Value Ref Range    Protime 15.7 (H) 9.8 - 12.8 seconds    INR 1.4 (H) 0.9 - 1.1    aPTT 72 (H) 27 - 38 seconds   CBC   Result Value Ref Range    WBC 13.0 (H) 4.4 - 11.3 x10*3/uL    nRBC 0.0 0.0 - 0.0 /100 WBCs    RBC 2.71 (L) 4.50 - 5.90 x10*6/uL    Hemoglobin 7.8 (L) 13.5 - 17.5 g/dL    Hematocrit 25.9 (L) 41.0 - 52.0 %    MCV 96 80 - 100 fL    MCH 28.8 26.0 - 34.0 pg    MCHC 30.1 (L) 32.0 - 36.0 g/dL    RDW 15.7 (H) 11.5 - 14.5 %    Platelets 283 150 - 450 x10*3/uL   CBC and Auto Differential   Result Value Ref Range    WBC 12.3 (H) 4.4 - 11.3 x10*3/uL    nRBC 0.0 0.0 - 0.0 /100 WBCs    RBC 2.66 (L) 4.50 - 5.90 x10*6/uL    Hemoglobin 7.6 (L) 13.5 - 17.5 g/dL    Hematocrit 24.5 (L) 41.0 - 52.0 %    MCV 92 80 - 100 fL    MCH 28.6 26.0 - 34.0 pg    MCHC 31.0 (L) 32.0 - 36.0 g/dL    RDW 15.3 (H) 11.5 - 14.5 %    Platelets 236 150 - 450 x10*3/uL    Neutrophils % 84.8 40.0 - 80.0 %    Immature Granulocytes %, Automated 1.1 (H) 0.0 - 0.9 %    Lymphocytes % 6.5 13.0 - 44.0 %    Monocytes % 7.2 2.0 - 10.0 %    Eosinophils % 0.2 0.0 - 6.0 %    Basophils % 0.2 0.0 - 2.0 %    Neutrophils Absolute 10.37 (H) 1.60 - 5.50 x10*3/uL    Immature Granulocytes Absolute, Automated 0.14 0.00 - 0.50 x10*3/uL    Lymphocytes Absolute 0.80 0.80 - 3.00 x10*3/uL    Monocytes Absolute 0.88 (H) 0.05 - 0.80 x10*3/uL    Eosinophils Absolute 0.03 0.00 - 0.40 x10*3/uL    Basophils Absolute 0.03 0.00 - 0.10 x10*3/uL   Renal function panel   Result Value Ref Range    Glucose 211 (H) 74 - 99 mg/dL    Sodium 135 (L) 136 - 145 mmol/L    Potassium 4.0 3.5 - 5.3 mmol/L    Chloride 95 (L) 98 - 107 mmol/L    Bicarbonate 27 21 - 32 mmol/L    Anion Gap 17 10 - 20 mmol/L    Urea Nitrogen 42 (H) 6 - 23 mg/dL    Creatinine 2.77 (H) 0.50 - 1.30 mg/dL    eGFR 23 (L) >60 mL/min/1.73m*2    Calcium 7.6 (L) 8.6 - 10.6 mg/dL    Phosphorus 3.3 2.5 - 4.9 mg/dL    Albumin 2.9 (L) 3.4 - 5.0 g/dL   Magnesium   Result  Value Ref Range    Magnesium 1.93 1.60 - 2.40 mg/dL   Coagulation Screen   Result Value Ref Range    Protime 16.2 (H) 9.8 - 12.8 seconds    INR 1.4 (H) 0.9 - 1.1    aPTT 73 (H) 27 - 38 seconds   Renal Function Panel   Result Value Ref Range    Glucose 167 (H) 74 - 99 mg/dL    Sodium 136 136 - 145 mmol/L    Potassium 3.5 3.5 - 5.3 mmol/L    Chloride 94 (L) 98 - 107 mmol/L    Bicarbonate 29 21 - 32 mmol/L    Anion Gap 17 10 - 20 mmol/L    Urea Nitrogen 41 (H) 6 - 23 mg/dL    Creatinine 2.82 (H) 0.50 - 1.30 mg/dL    eGFR 22 (L) >60 mL/min/1.73m*2    Calcium 8.0 (L) 8.6 - 10.6 mg/dL    Phosphorus 4.3 2.5 - 4.9 mg/dL    Albumin 3.0 (L) 3.4 - 5.0 g/dL   Magnesium   Result Value Ref Range    Magnesium 2.34 1.60 - 2.40 mg/dL      ECG 12 Lead  Result Date: 9/3/2024  Sinus tachycardia with Premature atrial complexes with Aberrant conduction Right bundle branch block Abnormal ECG When compared with ECG of 30-AUG-2024 10:33, Fusion complexes are no longer Present Aberrant conduction is now Present T wave inversion no longer evident in Lateral leads    Vascular US lower extremity venous duplex bilateral  Result Date: 8/29/2024  CONCLUSIONS:   Right Lower Venous: No evidence of acute deep vein thrombus visualized in the right lower extremity. Calf veins were not well visualized and only imaged segmentally.   Left Lower Venous: No evidence of acute deep vein thrombus visualized in the left lower extremity. Calf veins were not well visualized and only imaged segmentally.      ECG 12 Lead    Result Date: 8/27/2024  Sinus rhythm with occasional Premature ventricular complexes Right bundle branch block Septal infarct (cited on or before 27-AUG-2024) T wave abnormality, consider lateral ischemia Abnormal ECG When compared with ECG of 27-AUG-2024 10:07, Serial changes of Septal infarct Present Confirmed by Willem Ledbetter ([id number]) on 8/27/2024 5:14:34 PM    US liver with doppler  Result Date: 8/27/2024  1. Moderate volume ascites.   2.  Hepatomegaly with diffusely increased echogenicity of the liver which may be seen in the setting of steatosis. Correlate clinically with liver enzymes.   3. Unremarkable Doppler interrogation of the liver.   4. Incidental note of a right pleural effusion.   I     Transthoracic Echo (TTE) Complete  Result Date: 8/26/2024  CONCLUSIONS:  1. The left ventricular systolic function is severely decreased, with a visually estimated ejection fraction of 15-20%.    2. There is global hypokinesis of the left ventricle with minor regional variations.    3. Spectral Doppler shows a pseudonormal pattern of left ventricular diastolic filling.    4. There is an elevated mean left atrial pressure.    5. Left ventricular cavity size is severely dilated.    6. No left ventricular thrombus visualized.    7. There is mild eccentric left ventricular hypertrophy.    8. There is low normal right ventricular systolic function.    9. Moderately enlarged right ventricle.   10. The left atrium is severely dilated.   11. The right atrium is severely dilated.   12. Moderate mitral valve regurgitation.   13. Moderate to severe tricuspid regurgitation visualized.   14. Aortic valve sclerosis.   15. Mild to moderately elevated pulmonary artery pressure.   16. There is no evidence of a patent foramen ovale. QUANTITATIVE     Electrocardiogram, 12-lead PRN ACS symptoms  Result Date: 8/26/2024  Normal sinus rhythm Right bundle branch block Anteroseptal infarct , age undetermined Abnormal ECG No previous ECGs available See ED provider note for full interpretation and clinical correlation Confirmed by Heike Kincaid ([id number]) on 8/26/2024 12:32:41 AM    XR hip right with pelvis when performed 2 or 3 views  Result Date: 8/25/2024  1. Severe bilateral hip osteoarthrosis, right worse than left.       CT foot left wo IV contrast  Result Date: 8/25/2024  1. Diffuse soft tissue swelling and edema with skin thickening and skin surface ulceration of the dorsal  midfoot. No underlying soft tissue gas or collection to confirm the suspicion on recent plain film.. These findings are most suspicious for superficial soft tissue infection/cellulitis. 2. No erosive change or other acute osseous abnormality to suggest osteomyelitis.       XR chest 2 views  Result Date: 8/25/2024  Bilateral pleural effusions and likely bilateral basilar airspace disease right worse than left. Findings could represent some edema.       XR foot left 1-2 views  Result Date: 8/25/2024  1. Suspected soft tissue gas of the left forefoot raising concern for deep soft tissue infection. 2. Generalized soft tissue swelling extends along the dorsum of the foot and into the ankle and lower leg. 3. Possible skin wound of the dorsum of the forefoot.       MRI can be considered to assess for deep soft tissue infection and or osteomyelitis.         XR chest 1 view  Result Date: 8/25/2024  1.  Large right-sided pleural effusion with likely adjacent airspace disease. 2. Left lung appears clear. No pneumothorax.      CT head wo IV contrast  Result Date: 8/25/2024  1. No acute cortical infarct or acute intracranial hemorrhage. 2. Frontal scalp laceration without underlying osseous abnormality.       Scheduled medications  [Held by provider] enoxaparin, 30 mg, subcutaneous, q24h  folic acid, 1 mg, oral, Daily  hydrALAZINE, 10 mg, oral, TID  isosorbide dinitrate, 10 mg, oral, TID  multivitamin with minerals, 1 tablet, oral, Daily  pantoprazole, 40 mg, intravenous, q12h CORNELIO  perflutren protein A microsphere, 0.5 mL, intravenous, Once in imaging  ramelteon, 8 mg, oral, Nightly  sulfur hexafluoride microsphr, 2 mL, intravenous, Once in imaging  thiamine, 100 mg, oral, Daily      Continuous medications  furosemide, 20 mg/hr, Last Rate: 20 mg/hr (09/03/24 1257)      PRN medications  PRN medications: acetaminophen, oxyCODONE, polyethylene glycol, white petrolatum      Assessment/Plan    Dinesh Mccall is a 79 year old man  with unknown medical history presenting for fall with laceration in apparent acute decompensated heart failure of unclear etiology. He has severe bilateral LE edema with chronic skin changes and L dorsal mid foot cellulitis likely secondary to ulceration per CT read. Patient has signs via EMS, ED, primary team concerning for failure to thrive and social isolation. He is admitted for stabilization and work up of his cardiopulmonary status and cellulitis infection. He had significant output of 16 L on lasix gtt with improvement to dyspnea and BLE edema, now on 4 LNC. He is currently on gtt lasix with metolazone 5 mg. He is currently on hydralazine and isordil tid. Had IV pantoprazole bid due to UGIB. Pending investigations for unexplained PT/PTT prolongation.     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 No history of chest pain or heart attack. Some concern for reliability given unclear social status. His history and presentation is consistent with acute decompensated heart failure of unknown etiology. BNP >2000. TTE 8/26/2024: LVEF 15-20%, global hypokinesia of LV, elevated mean LAP, moderate MR, moderate/severe TR, moderately enlarged RV, mild/moderately elevated PAP. Admission weight 230lb. A1C 6.6%; LDL-C 36. HIV and TSH WNL.   Differential diagnosis of etiologies of HFrEF    - Ischemic CM, given his age group, family history of MI and heart failure, and his EKG of RBBB and 50 pack year smoking history     - Alcohol-induced CM? (History of drinking)    - Post-viral myocarditis?    - Amyloidosis?   :: Diuresis: IV lasix drip 10 mg/hr - I/O negative 16 L in total  :: C/w hydralazine & isordil for HFrEF 8/29/2024-now  :: Cardiac stress MRI deferred due to Hb<9 & hypervolemia  Plan   - continue O2  cannula 4 LPM, keep SpO2 >94%  - 2 gram Na diet, 1.2 L fluid restriction, strict I&O  - F/U daily standing weights   - continue IV lasix 10 mg/hr gtt drip, start metolazone 5 mg once  - plan to start GDMT if stable serum Cr + euvolemia achieved     # Left lower extremity infection, likely cellulitis per CT  Patient has LE edema bilaterally with more redness on the left. No pus noted. XR L Foot was suspicious for soft tissue gases. There is an ulceration on the anterior ankle but no open wound or evidence of pus. Culture were not taken prior to starting antibiotics. Follow up CT negative for deep space infection or osteo.   Antibiotics    - Vanc + Zosyn 8/25/2024 --> Vanc + ceftriaxone 8/25-8/26  Switched to oral antibiotics of bactrim 800 mg bid (since 8/26/24 - now). U/S Doppler DVT: no evidence of acute DVT both legs   - completed Bactrim (800) 2 tabs BID plan 5 days in total - free Abx     # Mechanical Post fall   # Upper GI bleeding (non-variceal; suspected ulcer-related disease from chronic ibuprofen (advil) use) with anemia (Hb 7.5)    - Ddx could have ACD/EMERY, anemia of CKD or dilutional anemia  CT Head negative. Patient complaining of R hip swelling this afternoon. Denies pain, but hyperventilated on palpation. Hemoglobin 8.9 AM-> 7.2 PM -> 7.0-7.5 8/30.   :: On 8/28 - notified of new episode of melena this morning.   - s/p 1 stitch for forehead laceration notable blood loss  - F/u Right Hip XR - severe osteoporosis of both hip (R>L)  - Serum iron 28, %saturation 8%, TIBC 353, ferritin 40   - corrected reticulocyte count 2.2%, index 1.15 (Ret-Hb 23 - low)     --> likely hypoproliferative anemia  :: Total PRC transfusion 3 units (8/28/24, 8/31/24, 9/3/24) - Hb 7.5     Have stable size of left arm ecchymosis, not having melena today.  Plan  - F/U CBC, keep Hb >8 g/dL due to GIB  - s/p IV pantoprazole 80 mg IV, c/w 40 mg IV bid  - GI deferred EGD until cardiac condition stable     # KIA vs CKD (Cr 2.2, no prior  Cr level) suspected from cardiorenal syndrome  # Diuretic-induced hypokalemia (Furosemide)  Possibly pre-renal related to ADHF presentation. High protein in urine, consider nephrotic syndrome, intrinsic injury in context of ongoing ibuprofen use. Given poor cardiac function, cardiorenal syndrome could also contribute to his serum Cr rising. F/U Cr 2.2 -> 2.4-2.7 -> 2.8-3.08  - urine elytes: urine Na <10, urine Cr 82.3, Urine K/Cr 60  Plan  - Continue IV lasix gtt drip for diuresis  - Avoid nephrotoxic medications  - Monitoring of RFP twice daily  - correct hypoK, hypoMg (keep K >4, Mg>2 due to long QTc)     # Alcohol use  Patient mentioned multiple shots a day to help him sleep, later said one one shot with soda. He endorses drinking to drowsiness every night. Elevated alk phos.  - CIWA protocol discontinued on 8/27/2024     # Insomnia  :: on Melatonin 3 -> 6 mg po at night --> still have trouble with sleeping and due to Qtc prolongation - trazodone, mirtazapine not suggested. Switch to ramelteon with improvement in sleep quality  Plan : switch to ramelteon (8 mg) 1 tab nightly           Will use add-on melatonin if not improving his sleep     # Unexplained PT/PTT prolongation with ecchymosis left arm  :: reported to be due to his overstretching of his left arm several days ago without significant trauma (not confirmed if before or after the day he was given lovenox) tender and warm. Ecchymosis present. Left elbow painful when moving but no tenderness at joint line.  Plan: - pain control: tylenol (625 mg) prn q 6 hr, oxy 5 prn q 8 h           - on ace wrap, cold compression           - will follow his clinical of ecchymosis, CBC           - W/U vitamin K level today (pending)           - after discussion with attending, will investigate if stable after diuresis (could be caused by congestive hepatopathy)     # Diarrhea with UGIB (8/28/2024) - watery, no pus/mucus/blood  - no signs of abdominal pain, N/V  -  Differential diagnosis: drug-induced (bactrim, furosemide)  - improved 8/29/2024     # Disposition: SNF/acute rehab facility  Patient likely not safe to go home for now. Will need social work and APS check on his house.     P: Be careful given ADHF  E: PRN  N: cardiac diet  GI PPX: Pantoprazole IV for treatment of UGIB  DVT PPX: Lovenox held due to UGIB  NOK: Skyla Kincaid (Sister) [phone] or [phone]     Ziggy Webb MD  PGY-1, Internal Medicine/Medical Genetics  [phone]

## 2024-09-03 NOTE — PROGRESS NOTES
Physical Therapy    Physical Therapy Treatment    Patient Name: Dinesh Mccall  MRN: [id number]  Today's Date: 9/3/2024  Time Calculation  Start Time: 1750  Stop Time: 1832  Time Calculation (min): 42 min  RN in room during treatment providing pt care for 10 min for a total of 32 min of treatment time.        Assessment/Plan   PT Assessment  PT Assessment Results: Decreased endurance, Impaired balance, Decreased mobility  Assessment Comment: Pt experiences SOB with mild exertion. Pt requires increased time for functional mobility and frequent rest breaks. Pt ambulated 8' from bed to transport bed using FWW with Min A. Pt appears very unsteady during ambulation and requires frequent cues for FWW management. Treatment ended with pt leaving with transport.  End of Session Patient Position:  (Pt in transport)     PT Plan  Treatment/Interventions: Bed mobility, Transfer training, Gait training, Balance training, Neuromuscular re-education, Strengthening, Endurance training, Range of motion, Therapeutic exercise, Therapeutic activity  PT Plan: Ongoing PT  PT Frequency: 3 times per week  PT Discharge Recommendations: Moderate intensity level of continued care  PT Recommended Transfer Status: Assistive device, Assist x2  PT - OK to Discharge: Yes (indicates PT eval complete and dc rec determined)      General Visit Information:   PT  Visit  PT Received On: 09/03/24  General  Prior to Session Communication: Bedside nurse  Patient Position Received: Bed, 3 rail up, Alarm on  General Comment: Pt supine in bed upon arrival. Pt pleasant and motivated for therapy. Pt eager to get OOB.    Subjective   Precautions:  Precautions  Medical Precautions: Fall precautions  Precautions Comment: 4L O2 via NC    Vital Signs (Past 2hrs)        Date/Time Vitals Session Patient Position Pulse Resp SpO2 BP MAP (mmHg)    09/03/24 1709 --  --  92  17  95 %  123/71  88                         Objective   Pain:     Cognition:  Cognition  Overall  Cognitive Status: Within Functional Limits  Coordination:       Activity Tolerance:  Activity Tolerance  Endurance: Tolerates 10 - 20 min exercise with multiple rests  Treatments:  Therapeutic Activity  Therapeutic Activity Performed: Yes  Therapeutic Activity 1: Pt completed bed mobility, transfers, and gait training.  Therapeutic Activity 2: Pt stood statically for approx. 2 min for pericare using FWW with Min A.    Bed Mobility  Bed Mobility: Yes  Bed Mobility 1  Bed Mobility 1: Supine to sitting  Level of Assistance 1: Minimum assistance  Bed Mobility Comments 1: HOB elevated, use of side rails.  Bed Mobility 2  Bed Mobility  2: Sitting to supine  Level of Assistance 2: Moderate assistance  Bed Mobility Comments 2: Assist with B LE.    Ambulation/Gait Training  Ambulation/Gait Training Performed: Yes  Ambulation/Gait Training 1  Surface 1: Level tile  Device 1: Rolling walker  Assistance 1: Minimum assistance  Quality of Gait 1: Soft knee(s), Forward flexed posture, Decreased step length, Inconsistent stride length  Comments/Distance (ft) 1: 8' pt very unsteady and requires constant cuing for FWW management.  Transfers  Transfer: Yes  Transfer 1  Transfer From 1: Bed to  Transfer to 1: Stand  Technique 1: Sit to stand  Transfer Device 1: Walker  Transfer Level of Assistance 1: Moderate assistance  Trials/Comments 1: Cues for proper hand placement.  Transfers 2  Transfer From 2: Stand to  Transfer to 2: Bed  Technique 2: Stand to sit  Transfer Device 2: Walker  Transfer Level of Assistance 2: Minimum assistance  Trials/Comments 2: Cues to reach back to control descent.  Transfers 3  Transfer From 3: Bed to, Commode-standard to  Transfer to 3: Commode-standard, Bed  Technique 3: Stand pivot  Transfer Device 3: Walker  Transfer Level of Assistance 3: Moderate assistance  Trials/Comments 3: Cues for hip management and hand placement.    Outcome Measures:  [facility] Basic Mobility  Turning from your back to your side  while in a flat bed without using bedrails: A little  Moving from lying on your back to sitting on the side of a flat bed without using bedrails: A little  Moving to and from bed to chair (including a wheelchair): A lot  Standing up from a chair using your arms (e.g. wheelchair or bedside chair): A lot  To walk in hospital room: A little  Climbing 3-5 steps with railing: Total  Basic Mobility - Total Score: 14    Education Documentation  Mobility Training, taught by Medina Knowles PTA at 9/3/2024  6:48 PM.  Learner: Patient  Readiness: Acceptance  Method: Explanation  Response: Verbalizes Understanding    Education Comments  No comments found.        OP EDUCATION:       Encounter Problems       Encounter Problems (Active)       Balance       tinetti score > 24 to indicate low risk for falls  (Progressing)       Start:  08/29/24    Expected End:  09/20/24               Mobility       STG - Patient will ambulate > 50' with LRAD modif indep  (Progressing)       Start:  08/29/24    Expected End:  09/20/24               PT Transfers       STG - Patient will perform bed mobility indep  (Progressing)       Start:  08/29/24    Expected End:  09/20/24            STG - Patient will transfer sit to and from stand LRAD modif indep  (Progressing)       Start:  08/29/24    Expected End:  09/20/24               Pain - Adult

## 2024-09-03 NOTE — PROGRESS NOTES
"Subjective:  SR 80-110s with isolated PVCs, couplets, triplets  15 beat run NSVT this AM  Continues to require 4L  oxygen  Denies CP/dizziness, palpitations   +SOB, +orthopnea      Objective:  Physical Exam  General: Obese male sitting up in bed, 4L nc, NAD  Skin:  warm and dry  HEENT: EOMI. MMM  Cardiovascular: RRR. JVD to earlobe at 60 degrees   Respiratory:  reduced A/E bibasilar R>L  Gastrointestinal: distended  Genitourinary: purwick with yellow urine  Extremities: 2-3+ pitting edema up to mid chest; ACE wrap to upper LUE, pitting edema below  Neurological: no gross focal deficits  Psychiatric: calm and cooperative        Last Recorded Vitals  Blood pressure 122/74, pulse 96, temperature 36.8 °C (98.2 °F), temperature source Temporal, resp. rate 18, height 1.803 m (5' 11\"), weight 104 kg (230 lb), SpO2 90%.     Imaging  8/26 Transthoracic Echo (TTE) Complete With Contrast   CONCLUSIONS:   1. The left ventricular systolic function is severely decreased, with a visually estimated ejection fraction of 15-20%.   2. There is global hypokinesis of the left ventricle with minor regional variations.   3. Spectral Doppler shows a pseudonormal pattern of left ventricular diastolic filling.   4. There is an elevated mean left atrial pressure.   5. Left ventricular cavity size is severely dilated.   6. No left ventricular thrombus visualized.   7. There is mild eccentric left ventricular hypertrophy.   8. There is low normal right ventricular systolic function.   9. Moderately enlarged right ventricle.  10. The left atrium is severely dilated.  11. The right atrium is severely dilated.  12. Moderate mitral valve regurgitation.  13. Moderate to severe tricuspid regurgitation visualized.  14. Aortic valve sclerosis.  15. Mild to moderately elevated pulmonary artery pressure.  16. There is no evidence of a patent foramen ovale.     8/25/24:  CT head w/o IV contrast:  IMPRESSION:  1. No acute cortical infarct or acute " intracranial hemorrhage.  2. Frontal scalp laceration without underlying osseous abnormality.    CXR 6:28am:  IMPRESSION:  1.  Large right-sided pleural effusion with likely adjacent airspace disease.  2. Left lung appears clear. No pneumothorax.    XR foot left:  IMPRESSION:  1. Suspected soft tissue gas of the left forefoot raising concern for deep soft tissue infection.  2. Generalized soft tissue swelling extends along the dorsum of the foot and into the ankle and lower leg.  3. Possible skin wound of the dorsum of the forefoot.    CXR 11:39am:  IMPRESSION:  Bilateral pleural effusions and likely bilateral basilar airspace  disease right worse than left. Findings could represent some edema.    CT left foot w/o IV contrast:  IMPRESSION:  1. Diffuse soft tissue swelling and edema with skin thickening and skin surface ulceration of the dorsal midfoot. No underlying soft tissue gas or collection to confirm the suspicion on recent plain film. These findings are most suspicious for superficial soft tissue infection/cellulitis.  2. No erosive change or other acute osseous abnormality to suggest osteomyelitis.    Imaging 8/26/24:  Vascular US LE Venous Duplex BL:  PRELIMINARY CONCLUSIONS:  Right Lower Venous: No evidence of acute deep vein thrombus visualized in the right lower extremity. Calf veins were imaged segmentally.  Left Lower Venous: No evidence of acute deep vein thrombus visualized in the left lower extremity. Calf veins were imaged segmentally.    US liver with doppler:  LIVER:  The liver is enlarged, measuring 19.2 cm and is diffusely echogenic in appearance, consistent with diffuse fatty infiltration.  GALLBLADDER:  The gallbladder is nondistended, and demonstrates no evidence of gallstones, wall thickening or surrounding fluid. The gallbladder wall thickness is 0.29 cm . Sonographic Wise's sign is negative.  BILIARY SYSTEM:  No evidence of intra or extrahepatic biliary dilatation is  identified; the  common bile duct measures 3.8 mm.  IMPRESSION:  1. Moderate volume ascites.  2. Hepatomegaly with diffusely increased echogenicity of the liver which may be seen in the setting of steatosis. Correlate clinically with liver enzymes.  3. Unremarkable Doppler interrogation of the liver.  4. Incidental note of a right pleural effusion.    Assessment/Plan   Dinesh Mccall is a 79 y.o. male presenting initially to ED by EMS with fall at home on 8/24/24, found to have acutely decompensated heart failure of unknown etiology with KIA, normocytic anemia, and left lower extremity cellulitis. Cardiology consulted for further management recommendations of acutely decompensated heart failure likely secondary to viral myocarditis vs. Ischemic cardiomyopathy  - BNP 2182    #Acute systolic heart failure (EF 15-20%)  - Continue aggressive diuresis with IV Lasix 80 mg IV followed by a gtt at 20 mg/hr  - Aggressively replete lytes while diuresing. Keep K+>4 and Mg+ >2   - As for GDMT:            - BB: defer for now iso ADHF            - ACE/ARB/ARNI: defer for now iso KIA            - MRA: defer for now iso KIA            - SGLT2-inhibitor: defer for now. Iso KIA   - Continue Hydralazine 10 mg PO and isosorbide dinitrate 10mg TID. Increase as BP tolerates  - 2 gram sodium diet, 2 liter fluid restriction, strict I&O, daily standing weights   - defer ischemic evaluation until acute issues resolve and more euvolemic  - Consult inpatient heart failure navigator  -prior to discharge, please arrange for follow up with cardiology, call  to schedule   - Recommend transfer to a Cardiology service     Cardiology will follow  Discussed with PAMELA Sheppard-CNP  Cardiology Consults    Please call with any questions  Pager [phone] M-F 7a-6p; Saturday 7a-2p  Pager [phone] all other times

## 2024-09-04 LAB
ALBUMIN SERPL BCP-MCNC: 3.1 G/DL (ref 3.4–5)
ALBUMIN SERPL BCP-MCNC: 3.1 G/DL (ref 3.4–5)
ALBUMIN SERPL BCP-MCNC: 3.3 G/DL (ref 3.4–5)
ANION GAP SERPL CALC-SCNC: 15 MMOL/L (ref 10–20)
ANION GAP SERPL CALC-SCNC: 20 MMOL/L (ref 10–20)
ANION GAP SERPL CALC-SCNC: 21 MMOL/L (ref 10–20)
ATRIAL RATE: 115 BPM
BASOPHILS # BLD AUTO: 0.06 X10*3/UL (ref 0–0.1)
BASOPHILS NFR BLD AUTO: 0.4 %
BLOOD EXPIRATION DATE: NORMAL
BNP SERPL-MCNC: 1975 PG/ML (ref 0–99)
BUN SERPL-MCNC: 42 MG/DL (ref 6–23)
BUN SERPL-MCNC: 43 MG/DL (ref 6–23)
BUN SERPL-MCNC: 44 MG/DL (ref 6–23)
CALCIUM SERPL-MCNC: 8.1 MG/DL (ref 8.6–10.6)
CALCIUM SERPL-MCNC: 8.2 MG/DL (ref 8.6–10.6)
CALCIUM SERPL-MCNC: 8.5 MG/DL (ref 8.6–10.6)
CHLORIDE SERPL-SCNC: 88 MMOL/L (ref 98–107)
CHLORIDE SERPL-SCNC: 88 MMOL/L (ref 98–107)
CHLORIDE SERPL-SCNC: 93 MMOL/L (ref 98–107)
CO2 SERPL-SCNC: 28 MMOL/L (ref 21–32)
CO2 SERPL-SCNC: 30 MMOL/L (ref 21–32)
CO2 SERPL-SCNC: 33 MMOL/L (ref 21–32)
CREAT SERPL-MCNC: 2.68 MG/DL (ref 0.5–1.3)
CREAT SERPL-MCNC: 2.69 MG/DL (ref 0.5–1.3)
CREAT SERPL-MCNC: 2.76 MG/DL (ref 0.5–1.3)
DISPENSE STATUS: NORMAL
EGFRCR SERPLBLD CKD-EPI 2021: 23 ML/MIN/1.73M*2
EGFRCR SERPLBLD CKD-EPI 2021: 23 ML/MIN/1.73M*2
EGFRCR SERPLBLD CKD-EPI 2021: 24 ML/MIN/1.73M*2
EOSINOPHIL # BLD AUTO: 0.14 X10*3/UL (ref 0–0.4)
EOSINOPHIL NFR BLD AUTO: 0.9 %
ERYTHROCYTE [DISTWIDTH] IN BLOOD BY AUTOMATED COUNT: 15.4 % (ref 11.5–14.5)
ERYTHROCYTE [DISTWIDTH] IN BLOOD BY AUTOMATED COUNT: 15.8 % (ref 11.5–14.5)
GLUCOSE SERPL-MCNC: 135 MG/DL (ref 74–99)
GLUCOSE SERPL-MCNC: 166 MG/DL (ref 74–99)
GLUCOSE SERPL-MCNC: 91 MG/DL (ref 74–99)
HCT VFR BLD AUTO: 26.1 % (ref 41–52)
HCT VFR BLD AUTO: 30 % (ref 41–52)
HGB BLD-MCNC: 8 G/DL (ref 13.5–17.5)
HGB BLD-MCNC: 8.9 G/DL (ref 13.5–17.5)
IMM GRANULOCYTES # BLD AUTO: 0.13 X10*3/UL (ref 0–0.5)
IMM GRANULOCYTES NFR BLD AUTO: 0.9 % (ref 0–0.9)
LYMPHOCYTES # BLD AUTO: 1.15 X10*3/UL (ref 0.8–3)
LYMPHOCYTES NFR BLD AUTO: 7.7 %
MAGNESIUM SERPL-MCNC: 2.13 MG/DL (ref 1.6–2.4)
MAGNESIUM SERPL-MCNC: 2.13 MG/DL (ref 1.6–2.4)
MAGNESIUM SERPL-MCNC: 2.22 MG/DL (ref 1.6–2.4)
MCH RBC QN AUTO: 28.3 PG (ref 26–34)
MCH RBC QN AUTO: 28.6 PG (ref 26–34)
MCHC RBC AUTO-ENTMCNC: 29.7 G/DL (ref 32–36)
MCHC RBC AUTO-ENTMCNC: 30.7 G/DL (ref 32–36)
MCV RBC AUTO: 93 FL (ref 80–100)
MCV RBC AUTO: 95 FL (ref 80–100)
MONOCYTES # BLD AUTO: 1.09 X10*3/UL (ref 0.05–0.8)
MONOCYTES NFR BLD AUTO: 7.3 %
NEUTROPHILS # BLD AUTO: 12.28 X10*3/UL (ref 1.6–5.5)
NEUTROPHILS NFR BLD AUTO: 82.8 %
NRBC BLD-RTO: 0 /100 WBCS (ref 0–0)
NRBC BLD-RTO: 0 /100 WBCS (ref 0–0)
P AXIS: -10 DEGREES
P OFFSET: 173 MS
P ONSET: 127 MS
PHOSPHATE SERPL-MCNC: 4.3 MG/DL (ref 2.5–4.9)
PHOSPHATE SERPL-MCNC: 4.4 MG/DL (ref 2.5–4.9)
PHOSPHATE SERPL-MCNC: 5.1 MG/DL (ref 2.5–4.9)
PLATELET # BLD AUTO: 268 X10*3/UL (ref 150–450)
PLATELET # BLD AUTO: 287 X10*3/UL (ref 150–450)
POTASSIUM SERPL-SCNC: 3.1 MMOL/L (ref 3.5–5.3)
POTASSIUM SERPL-SCNC: 3.9 MMOL/L (ref 3.5–5.3)
POTASSIUM SERPL-SCNC: 4.2 MMOL/L (ref 3.5–5.3)
PR INTERVAL: 174 MS
PRODUCT BLOOD TYPE: 5100
PRODUCT CODE: NORMAL
Q ONSET: 214 MS
QRS COUNT: 19 BEATS
QRS DURATION: 186 MS
QT INTERVAL: 394 MS
QTC CALCULATION(BAZETT): 545 MS
QTC FREDERICIA: 489 MS
R AXIS: -27 DEGREES
RBC # BLD AUTO: 2.8 X10*6/UL (ref 4.5–5.9)
RBC # BLD AUTO: 3.15 X10*6/UL (ref 4.5–5.9)
SODIUM SERPL-SCNC: 133 MMOL/L (ref 136–145)
SODIUM SERPL-SCNC: 135 MMOL/L (ref 136–145)
SODIUM SERPL-SCNC: 137 MMOL/L (ref 136–145)
T AXIS: -7 DEGREES
T OFFSET: 411 MS
UNIT ABO: NORMAL
UNIT NUMBER: NORMAL
UNIT RH: NORMAL
UNIT VOLUME: 283
VENTRICULAR RATE: 115 BPM
WBC # BLD AUTO: 13.7 X10*3/UL (ref 4.4–11.3)
WBC # BLD AUTO: 14.9 X10*3/UL (ref 4.4–11.3)
XM INTEP: NORMAL

## 2024-09-04 PROCEDURE — 84100 ASSAY OF PHOSPHORUS: CPT

## 2024-09-04 PROCEDURE — 2500000001 HC RX 250 WO HCPCS SELF ADMINISTERED DRUGS (ALT 637 FOR MEDICARE OP)

## 2024-09-04 PROCEDURE — 36430 TRANSFUSION BLD/BLD COMPNT: CPT

## 2024-09-04 PROCEDURE — 99232 SBSQ HOSP IP/OBS MODERATE 35: CPT | Performed by: STUDENT IN AN ORGANIZED HEALTH CARE EDUCATION/TRAINING PROGRAM

## 2024-09-04 PROCEDURE — 83880 ASSAY OF NATRIURETIC PEPTIDE: CPT

## 2024-09-04 PROCEDURE — 2500000004 HC RX 250 GENERAL PHARMACY W/ HCPCS (ALT 636 FOR OP/ED)

## 2024-09-04 PROCEDURE — 2500000002 HC RX 250 W HCPCS SELF ADMINISTERED DRUGS (ALT 637 FOR MEDICARE OP, ALT 636 FOR OP/ED)

## 2024-09-04 PROCEDURE — 36415 COLL VENOUS BLD VENIPUNCTURE: CPT

## 2024-09-04 PROCEDURE — 85027 COMPLETE CBC AUTOMATED: CPT

## 2024-09-04 PROCEDURE — 2500000004 HC RX 250 GENERAL PHARMACY W/ HCPCS (ALT 636 FOR OP/ED): Performed by: STUDENT IN AN ORGANIZED HEALTH CARE EDUCATION/TRAINING PROGRAM

## 2024-09-04 PROCEDURE — 2060000001 HC INTERMEDIATE ICU ROOM DAILY

## 2024-09-04 PROCEDURE — 83735 ASSAY OF MAGNESIUM: CPT

## 2024-09-04 PROCEDURE — P9016 RBC LEUKOCYTES REDUCED: HCPCS

## 2024-09-04 PROCEDURE — 80069 RENAL FUNCTION PANEL: CPT

## 2024-09-04 PROCEDURE — 85025 COMPLETE CBC W/AUTO DIFF WBC: CPT

## 2024-09-04 RX ORDER — ISOSORBIDE MONONITRATE 30 MG/1
30 TABLET, EXTENDED RELEASE ORAL DAILY
Status: DISCONTINUED | OUTPATIENT
Start: 2024-09-05 | End: 2024-09-09 | Stop reason: HOSPADM

## 2024-09-04 RX ORDER — ACETAMINOPHEN 500 MG
5 TABLET ORAL ONCE
Status: COMPLETED | OUTPATIENT
Start: 2024-09-04 | End: 2024-09-04

## 2024-09-04 RX ORDER — FUROSEMIDE 10 MG/ML
120 INJECTION INTRAMUSCULAR; INTRAVENOUS ONCE
Status: COMPLETED | OUTPATIENT
Start: 2024-09-04 | End: 2024-09-04

## 2024-09-04 RX ORDER — ISOSORBIDE DINITRATE 10 MG/1
10 TABLET ORAL
Status: COMPLETED | OUTPATIENT
Start: 2024-09-04 | End: 2024-09-04

## 2024-09-04 RX ORDER — ISOSORBIDE DINITRATE 10 MG/1
10 TABLET ORAL
Status: DISCONTINUED | OUTPATIENT
Start: 2024-09-04 | End: 2024-09-04

## 2024-09-04 ASSESSMENT — COGNITIVE AND FUNCTIONAL STATUS - GENERAL
DAILY ACTIVITIY SCORE: 15
WALKING IN HOSPITAL ROOM: TOTAL
MOVING FROM LYING ON BACK TO SITTING ON SIDE OF FLAT BED WITH BEDRAILS: A LOT
PERSONAL GROOMING: A LITTLE
TOILETING: A LOT
MOVING TO AND FROM BED TO CHAIR: A LOT
DRESSING REGULAR UPPER BODY CLOTHING: A LITTLE
STANDING UP FROM CHAIR USING ARMS: A LOT
DRESSING REGULAR LOWER BODY CLOTHING: A LOT
MOBILITY SCORE: 10
TURNING FROM BACK TO SIDE WHILE IN FLAT BAD: A LOT
MOBILITY SCORE: 10
DRESSING REGULAR UPPER BODY CLOTHING: A LITTLE
MOVING FROM LYING ON BACK TO SITTING ON SIDE OF FLAT BED WITH BEDRAILS: A LOT
CLIMB 3 TO 5 STEPS WITH RAILING: TOTAL
TURNING FROM BACK TO SIDE WHILE IN FLAT BAD: A LOT
CLIMB 3 TO 5 STEPS WITH RAILING: TOTAL
MOVING TO AND FROM BED TO CHAIR: A LOT
EATING MEALS: A LITTLE
STANDING UP FROM CHAIR USING ARMS: A LOT
HELP NEEDED FOR BATHING: A LOT
DAILY ACTIVITIY SCORE: 15
EATING MEALS: A LITTLE
HELP NEEDED FOR BATHING: A LOT
DRESSING REGULAR LOWER BODY CLOTHING: A LOT
PERSONAL GROOMING: A LITTLE
TOILETING: A LOT
WALKING IN HOSPITAL ROOM: TOTAL

## 2024-09-04 ASSESSMENT — PAIN SCALES - GENERAL
PAINLEVEL_OUTOF10: 7
PAINLEVEL_OUTOF10: 3
PAINLEVEL_OUTOF10: 8

## 2024-09-04 ASSESSMENT — ACTIVITIES OF DAILY LIVING (ADL): LACK_OF_TRANSPORTATION: NO

## 2024-09-04 ASSESSMENT — PAIN - FUNCTIONAL ASSESSMENT
PAIN_FUNCTIONAL_ASSESSMENT: 0-10

## 2024-09-04 NOTE — PROGRESS NOTES
Internal Medicine Progress Note      Subjective   Subjective   Pt was seen at the bedside this morning. On 4L NC with no difficulty breathing. Denies any bloody bowel movements the past 24 hrs. States he still has difficulty breathing if he were to lay flat in the bed.     Objective     Vitals: I/O:   Vitals:    09/04/24 0744   BP: 108/88   Pulse: 89   Resp: 14   Temp: 37.1 °C (98.8 °F)   SpO2: 96%      24hr Min/Max:  Temp  Min: 36.4 °C (97.5 °F)  Max: 37.1 °C (98.8 °F)  Pulse  Min: 70  Max: 102  BP  Min: 108/88  Max: 132/77  Resp  Min: 14  Max: 42  SpO2  Min: 95 %  Max: 97 %   Intake/Output Summary (Last 24 hours) at 9/4/2024 1054  Last data filed at 9/4/2024 1054  Gross per 24 hour   Intake 1184.85 ml   Output 3750 ml   Net -2565.15 ml         Physical Exam:  General: Awake, alert, orientated. not in any acute distress  HEENT: no scleral icterus or conjunctivitis  Chest: CTAB, normal respiratory effort, no wheezes or crackles, on 4L NC supplemental oxygen. JVD noted to mid neck  Cardiac: Soft heart sounds, RRR, no Murmurs or rubs.   Abdomen: Soft, Distended, NT  EXT: 1+ peripheral edema bilaterally, no asymmetry noted. Chronic skin changes bilaterally. Peeling of skin on left leg. ACE wrap on left arm with notable edema.   MSK: No focal joint swelling noted  Skin: Ecchymosis of left arm, most prominent around elbow and forearm.   Neuro: AOx4, moving all limbs spontaneously, follows commands  Psych: Coherent thought process, appropriate mood and affect    General Chemistry Labs    Results from last 72 hours   Lab Units 09/04/24  0644 09/03/24  2122 09/03/24  0821   GLUCOSE mg/dL 91 141* 167*   SODIUM mmol/L 137 136 136   POTASSIUM mmol/L 4.2 3.3* 3.5   CHLORIDE mmol/L 93* 93* 94*   CO2 mmol/L 33* 32 29   BUN mg/dL 42* 40* 41*   CREATININE mg/dL 2.69* 2.76* 2.82*   ANION GAP mmol/L 15 14 17   EGFR mL/min/1.73m*2 23* 23* 22*   CALCIUM mg/dL 8.5* 8.5* 8.0*   PHOSPHORUS mg/dL 4.4 4.3 4.3   MAGNESIUM mg/dL 2.13 2.22  "2.34   ALBUMIN g/dL 3.1* 3.1* 3.0*      CBC  Results from last 72 hours   Lab Units 09/04/24  0644 09/02/24 2145 09/02/24  1415 09/02/24  0903   WBC AUTO x10*3/uL 14.9* 12.3* 13.0* 11.2   HEMOGLOBIN g/dL 8.0* 7.6* 7.8* 6.7*   HEMATOCRIT % 26.1* 24.5* 25.9* 20.9*   MCV fL 93 92 96 88   MCH pg 28.6 28.6 28.8 28.2   MCHC g/dL 30.7* 31.0* 30.1* 32.1   RDW % 15.8* 15.3* 15.7* 15.2*   PLATELETS AUTO x10*3/uL 268 236 283 247   NEUTROS PCT AUTO % 82.8 84.8  --  82.5   LYMPHS PCT AUTO % 7.7 6.5  --  7.5   MONOS PCT AUTO % 7.3 7.2  --  8.2   EOS PCT AUTO % 0.9 0.2  --  0.9     Coagulation Labs  Results from last 72 hours   Lab Units 09/02/24 2145 09/02/24  1415   INR  1.4* 1.4*   PROTIME seconds 16.2* 15.7*   APTT seconds 73* 72*     Cardiac Labs      Micro/ID:   No results found for: \"URINECULTURE\", \"BLOODCULT\", \"CSFCULTSMEAR\"          Scheduled Medications:  PRN Medications:  Continuous Medications:   [Held by provider] enoxaparin, 30 mg, subcutaneous, q24h  folic acid, 1 mg, oral, Daily  furosemide, 120 mg, intravenous, Once  hydrALAZINE, 10 mg, oral, TID  [START ON 9/5/2024] isosorbide mononitrate ER, 30 mg, oral, Daily  multivitamin with minerals, 1 tablet, oral, Daily  pantoprazole, 40 mg, intravenous, q12h CORNELIO  perflutren protein A microsphere, 0.5 mL, intravenous, Once in imaging  ramelteon, 8 mg, oral, Nightly  sulfur hexafluoride microsphr, 2 mL, intravenous, Once in imaging  thiamine, 100 mg, oral, Daily     PRN medications: acetaminophen, oxyCODONE, polyethylene glycol, white petrolatum furosemide, 30 mg/hr, Last Rate: 30 mg/hr (09/04/24 1054)         Summary of key imaging results from the last 24 hours:    XR chest 1 view    Result Date: 9/3/2024  Interpreted By:  Morales Lynn, STUDY: XR CHEST 1 VIEW; 9/3/2024 12:05 pm   INDICATION: Signs/Symptoms:eval pleural effusions.   COMPARISON: 08/25/2024.   ACCESSION NUMBER(S): [id number]   ORDERING CLINICIAN: JC MARTINEZ   FINDINGS:     CARDIOMEDIASTINAL " SILHOUETTE: Cardiomegaly versus pericardial effusion.   LUNGS: Slight interval worsening in right-sided edema with perihilar edema. No pneumothorax.   ABDOMEN: No remarkable upper abdominal findings.   BONES: No acute osseous changes.       1.  Question slight interval worsening in right-sided effusion/edema. Correlate with underlying ascites/subphrenic process. Cardiomegaly versus pericardial effusion and correlate with fluid status.     Signed by: Morales Lynn 9/3/2024 3:06 PM Dictation workstation:   [id number]         Assessment and Plan    Dinesh Mccall is a 79 year old man with unknown medical history presenting for fall with in apparent acute decompensated heart failure of unclear etiology with mild respiratory distress. BNP was 2182, and TTE revealed 15-20%, global hypokinesia of LV, elevated mean LAP, moderate MR, moderate/severe TR, moderately enlarged RV, mild/moderately elevated PAP. He also had severe bilateral LE edema with chronic skin changes and L dorsal mid foot cellulitis likely secondary to ulceration per CT read for which he completed a course of abx. For the ADHF, he was started on a lasix gtt with significant improvement to his dyspnea and BLE /edema, now on 4 L NC. He is currently on lasix drip and On IV pantoprazole bid due to UGIB. Transferred to Caridiology service for continued management of decompensated heart failure.     Updates 09/04/24:  - DVT LUE pending  - increase lasix gtt to 30mg/hr and IV lasix 120 mg bolus this morning; goal net negative 2L fluid balance   - RFP three times a day while being diuresed   - Fluid restriction 1.5L  - Transfusing for Hgb > 9 for cardiac MRI with regadenoson for ischemic evaluation   -1 unit pRBCs ordered  - Followed up with GI and they stated since Hgb is improving with conservative management and still high risk they will continue to defer EGD until after cardiac MRI with stress.  - Switched Isordil 10 mg TID to Imdur 30 mg  daily      #Acute Decompensated Systolic Heart Failure (EF 15-20%), unclear etiology, Stage C NYHA class III  #RBBB  #Suspected Pulmonary HTN Type 2  -BNP 2182  -Admission weight 230lb, Current weight 210lbs. Dry weight unknown  -CXR 9/3: Questionable slight interval worsening in right-sided effusion/edema.   -TTE 8/26: LVEF 15-20%, global hypokinesia of LV, elevated mean LAP, moderate MR, moderate/severe TR, moderately enlarged RV, mild/moderately elevated PAP   -Liver US 8/25 showed moderate ascites with hepatomegaly  - Cardiology was consulted in past and recommended cardiac MRI stress. Thought etiology is related to post viral syndrome vs ischemic cardiomyopathy. Unable to get cardiac MRI due to Hgb < 9 since admission  Plan:  - Increase Lasix gtt 30 mg/hr, IV lasix 120 mg bolus this morning   -Goal net negative 2L fluid balance   -strict I/Os   -1.5 L fluid restriction   -RFP 3x a day while being diuressed  -GDMT:            - BB: defer for now iso ADHF            - ACE/ARB/ARNI: defer for now iso KIA            - MRA: defer for now iso KIA            - SGLT2-inhibitor: defer for now. Iso KIA             - Hydralazine 10 mg PO and switched Isordil to Imdur 30 mg daily, Goal BP < 140  - Transfusing for Hgb > 9 for Cardiac MRI with regadenoson   - 1 unit pRBCs ordered      #Melena, improving  #Anemia, (Hgb 7-8 while in hospital)  -anemia etiology likely iron deficiency anemia with compounded GI bleed.  -GI consulted on admission but deferred EGD due to decompensated heart failure   - Reports taking 6-8 Advil daily for a year  - Iron 28, Transferrin 263, TIBC sat 8%, ferritin 40 > Iron deficiency  - Haptoglobin 167 and   - last melena noted on 9/2, bowel movement without melena on 9/3  - received pRBCs on 9/2   - total received 3 units pRBCs during this admission.   - 1 unit pRBCs ordered 9/4  Plan:  - holding lovenox due to GI bleed  - transfuse for Hgb>7 due to GI bleed   - Transfusing for Hgb > 9 for  cardiac MRI with regadenoson  - Continue IV pantoprazole 40mg BID  - Followed up with GI since patient is clinically improving in regards to decompensated heart failure. They will continue to defer EGD since patient is high risk until cardiac MRI is done      #KIA, improving  - etiology presumed cardiorenal due to decompensated heart failure.   - No baseline Cr available   - Admission Cr. 2.2, peak 3.08, now at 2.69  -urine lytes 8/25: urine Na <10, urine Cr 82.3, Urine K/Cr 60   Plan:  -Continue diuresis with lasix gtt   -avoid nephrotoxic agents  -CTM creatinine  -RFP 3x a day while being diuresed  -replete electrolytes for K>4 and Mg>2    #LUE edema and ecchymosis  - no significant trauma  - new edema and ecchymosis occurred while in hospital  - mild pain with movement at the elbow  - PT 16.2, INR 1.4, PTT 73  Plan:  -Doppler US of LUE pending  -Ace band and cold compress  -Vitamin K levels pending  -pain regimen: Tylenol 650 mg prn and Oxycodone 5mg prn      #Insomnia  -was on Melatonin 6 mg nighty with continued difficulty sleeping  - Qtc prolongation noted on EKG  - trazodone, mirtazapine not suggested.   -Switched to ramelteon with improvement in sleep quality  Plan:  - continue ramelteon 8mg nightly    #LLE cellulitis, resolved  -Unilateral erythema on LLE. No pus noted.   - X ray of L foot concerning for soft tissue gas  - doppler LE negative for DVT  - CT foot negative for deep space infection or osteomyelitis  - was treated with Vanc and Zosyn and then Vanc and Ceftriaxone from 8/24-8/26  -switched to Bactrim 800mg on 8/26 for a 5 day course, completed    # Disposition: SNF/acute rehab facility  Patient likely not safe to go home for now. Will need social work and APS check on his house.    Medical Check List   FEN  -Fluids:  PRN cautiously given HFrEF 15-20%  -Electrolytes: PRN, with goals of Mg >2, K>4  -Nutrition: Cardiac diet  Prophylaxis:  -DVT ppx: Held iso melena  -GI ppx/Bowel care: IV  Pantoprazole 40mg BID   -Abx: None  -Pain regimen: Tylenol 650mg and Oxycodone 5mg prn  Hardware:  -Drains: Garcia  -Lines: PIV  Social:  -Code: Full Code  -NOK/Surrogate Decision Maker: Skyla Kincaid (Sister) [phone] or [phone]      Patient seen and staffed with attending provider Dr. Marshall.  Martín Jackson MD  Internal Medicine PGY-1    Disclaimer: Documentation completed with the information available at the time of input. The times in the chart may not be reflective of actual patient care times, interventions, or procedures. Documentation occurs after the physical care of the patient.

## 2024-09-04 NOTE — CARE PLAN
Problem: Skin  Goal: Decreased wound size/increased tissue granulation at next dressing change  Outcome: Progressing  Goal: Participates in plan/prevention/treatment measures  Outcome: Progressing  Goal: Prevent/manage excess moisture  Outcome: Progressing  Goal: Prevent/minimize sheer/friction injuries  Outcome: Progressing  Goal: Promote/optimize nutrition  Outcome: Progressing  Goal: Promote skin healing  Outcome: Progressing     Problem: Respiratory  Goal: Clear secretions with interventions this shift  Outcome: Progressing  Goal: Minimize anxiety/maximize coping throughout shift  Outcome: Progressing  Goal: Minimal/no exertional discomfort or dyspnea this shift  Outcome: Progressing  Goal: No signs of respiratory distress (eg. Use of accessory muscles. Peds grunting)  Outcome: Progressing  Goal: Patent airway maintained this shift  Outcome: Progressing  Goal: Tolerate mechanical ventilation evidenced by VS/agitation level this shift  Outcome: Progressing  Goal: Tolerate pulmonary toileting this shift  Outcome: Progressing  Goal: Verbalize decreased shortness of breath this shift  Outcome: Progressing  Goal: Wean oxygen to maintain O2 saturation per order/standard this shift  Outcome: Progressing  Goal: Increase self care and/or family involvement in next 24 hours  Outcome: Progressing     Problem: Pain - Adult  Goal: Verbalizes/displays adequate comfort level or baseline comfort level  Outcome: Progressing     Problem: Safety - Adult  Goal: Free from fall injury  Outcome: Progressing    Pt HDS, given 1 unit of blood. Diuresed on IV lasix drip. Plan for cardiac MRI.

## 2024-09-04 NOTE — CONSULTS
"Nutrition Follow Up Assessment:   Nutrition Assessment    Reason for Assessment: Dietitian discretion    Patient is a 79 y.o. male presenting with unknown medical history presenting for fall with in apparent acute decompensated heart failure of unclear etiology with mild respiratory distress. BNP was 2182, and TTE revealed 15-20%, global hypokinesia of LV, elevated mean LAP, moderate MR, moderate/severe TR, moderately enlarged RV, mild/moderately elevated PAP. He also had severe bilateral LE edema with chronic skin changes and L dorsal mid foot cellulitis likely secondary to ulceration per CT read for which he completed a course of abx. For the ADHF, he was started on a lasix gtt with significant improvement to his dyspnea and BLE /edema, now on 4 L NC. He is currently on lasix drip and On IV pantoprazole bid due to UGIB. Transferred to Cardiology service for continued management of decompensated heart failure.       Nutrition History:  Energy Intake: Fair 50-75 %  Food and Nutrient History: Pt has been NPO for a couple of days since last nutrition visit( 8/28-8/30) Pt currently on adult diet cardiac, tolerating diet well. Pt consumed 100% on meal on 9/4 and 9/2. Variable intake due to NPO status during hospital stay. Last melena on BM was on 9/2, no other episodes reported since. Continues to use Ensure plus BID, tolerating well. No nutrition questions or concerns at this time.  Food Allergies/Intolerances:  None  GI Symptoms: None  Oral Problems: None       Anthropometrics:  Height: 180.3 cm (5' 11\")   Weight: 87.3 kg (192 lb 7.4 oz)   BMI (Calculated): 26.85  IBW/kg (Dietitian Calculated): 78.2 kg          Weight History:   Date/Time Weight   09/04/24 1000 87.3 kg (192 lb 7.4 oz)   09/04/24 0500 90 kg (198 lb 6.6 oz)   09/03/24 1100 95.5 kg (210 lb 8.6 oz)   08/25/24 0415 104 kg (230 lb)     Weight Change %:  Weight History / % Weight Change: Per documented weights,  Pt has lost 38# since admission, this would be " significant weight loss however this likely is immaculate due to scale variances and some fluid shifts as this big loss was within the last 10 days.    Nutrition Focused Physical Exam Findings:    Subcutaneous Fat Loss:   Orbital Fat Pads: Mild-Moderate (slight dark circles and slight hollowing)  Buccal Fat Pads: Mild-Moderate (flat cheeks, minimal bounce)  Triceps: Well nourished (ample fat tissue)  Muscle Wasting:  Temporalis: Mild-Moderate (slight depression)  Deltoid/Trapezius: Well nourished (rounded appearance at arm, shoulder, neck)  Interosseous: Well nourished (muscle bulges)  Edema:  Edema: +1 trace  Edema Location: BLE  Physical Findings:  Skin: Positive    Nutrition Significant Labs:  CBC Trend:   Results from last 7 days   Lab Units 09/04/24 0644 09/02/24 2145 09/02/24 1415 09/02/24 0903   WBC AUTO x10*3/uL 14.9* 12.3* 13.0* 11.2   RBC AUTO x10*6/uL 2.80* 2.66* 2.71* 2.38*   HEMOGLOBIN g/dL 8.0* 7.6* 7.8* 6.7*   HEMATOCRIT % 26.1* 24.5* 25.9* 20.9*   MCV fL 93 92 96 88   PLATELETS AUTO x10*3/uL 268 236 283 247    , BMP Trend:   Results from last 7 days   Lab Units 09/04/24 0644 09/03/24 2122 09/03/24 0821 09/02/24 2145   GLUCOSE mg/dL 91 141* 167* 211*   CALCIUM mg/dL 8.5* 8.5* 8.0* 7.6*   SODIUM mmol/L 137 136 136 135*   POTASSIUM mmol/L 4.2 3.3* 3.5 4.0   CO2 mmol/L 33* 32 29 27   CHLORIDE mmol/L 93* 93* 94* 95*   BUN mg/dL 42* 40* 41* 42*   CREATININE mg/dL 2.69* 2.76* 2.82* 2.77*    , Renal Lab Trend:   Results from last 7 days   Lab Units 09/04/24 0644 09/03/24 2122 09/03/24 0821 09/02/24 2145   POTASSIUM mmol/L 4.2 3.3* 3.5 4.0   PHOSPHORUS mg/dL 4.4 4.3 4.3 3.3   SODIUM mmol/L 137 136 136 135*   MAGNESIUM mg/dL 2.13 2.22 2.34 1.93   EGFR mL/min/1.73m*2 23* 23* 22* 23*   BUN mg/dL 42* 40* 41* 42*   CREATININE mg/dL 2.69* 2.76* 2.82* 2.77*        Nutrition Specific Medications:  All med's reviewed     I/O:   Last BM Date: 09/03/24; Stool Appearance: Soft (09/03/24 7274)    Dietary  Orders (From admission, onward)       Start     Ordered    09/03/24 1301  Adult diet Cardiac; 70 gm fat; 2 - 3 grams Sodium; 1500 mL fluid  Diet effective now        Comments: Can give him ice chips as needed for dry mouth   Question Answer Comment   Diet type Cardiac    Fat restriction: 70 gm fat    Sodium restriction: 2 - 3 grams Sodium    Dietary fluid restriction / 24h: 1500 mL fluid        09/03/24 1300    08/27/24 1426  Oral nutritional supplements  Until discontinued        Question Answer Comment   Deliver with Lunch    Deliver with Dinner    Select supplement: Ensure Plus        08/27/24 1426                     Estimated Needs:   Total Energy Estimated Needs (kCal):  (6442-6300)  Method for Estimating Needs: 25-30 kcal/kg IBW  Total Protein Estimated Needs (g):  ()  Method for Estimating Needs: 1.2-1.4 g/kg IBW  Total Fluid Estimated Needs (mL):  (per team)           Nutrition Diagnosis   Malnutrition Diagnosis  Patient has Malnutrition Diagnosis: Yes  Diagnosis Status: Ongoing  Malnutrition Diagnosis: Moderate malnutrition related to chronic disease or condition  As Evidenced by: mild to moderate muscle loss and subcutaneous fat loss. Also question if pt is consuming >75% of EEN.            Nutrition Interventions/Recommendations         Nutrition Prescription:  Individualized Nutrition Prescription Provided for : 4093-1872 kcal, 90+ gm PRO        Nutrition Interventions:   Interventions: Meals and snacks  Goal: Continue adult diet cardiac as tolerated and per team, will liberalized fat restriction to better meet nutrition needs  Medical Food Supplement: Commercial beverage  Goal: Continue Ensure plus BID- Provides additional 350 kcal and 13 gm PRO      Nutrition Education: N/A          Nutrition Monitoring and Evaluation   Food/Nutrient Related History Monitoring  Monitoring and Evaluation Plan: Energy intake, Amount of food  Energy Intake: Estimated energy intake  Criteria: >75% of energy needs  met via PO+ supplements  Amount of Food: Estimated amout of food  Criteria: >50% of meals and supplements    Body Composition/Growth/Weight History  Monitoring and Evaluation Plan: Weight  Criteria: no sig weight changes                   Time Spent (min): 45 minutes

## 2024-09-04 NOTE — PROGRESS NOTES
Gastroenterology Consult Service Progress Note  Department of Gastroenterology & Hepatology  [facility]    [facility]  September 4, 2024   Patient: Dinesh Mccall    Medical Record: [id number]  Reason for Initial Consult: Melenic Stools    Interval History:   Patient on lasix gtt with good diuresis. Sat in chair today for the first time. GI called to re-evaluate for EGD. Patient has brown bowel movements over the past several days. He is tolerating PPI. He denies hematemesis, hematochezia.      Physical Exam:  Vitals:    09/04/24 1213 09/04/24 1230 09/04/24 1320 09/04/24 1405   BP: 112/66 111/73 120/68 112/64   Pulse: 86 82 84 83   Resp: 18 19 18 18   Temp: 35.3 °C (95.5 °F) 35.5 °C (95.9 °F) 35.9 °C (96.6 °F) 35.7 °C (96.3 °F)   TempSrc: Temporal Temporal Temporal Temporal   SpO2: 100%      Weight:       Height:             Intake/Output Summary (Last 24 hours) at 9/4/2024 1535  Last data filed at 9/4/2024 1505  Gross per 24 hour   Intake 1413.87 ml   Output 3350 ml   Net -1936.13 ml     GGen: chronically ill appearing, NAD  HEENT: EOMI, sclera anicteric, no conjunctival injection,   Resp: Normal work of breathing on nasal cannula  CV: regular rate and rhythm  GI: non-tender, non-distended  MSK: warm and well perfused, pitting edema of all extremities, L > R   Neuro: AAOx3, no focal deficits  Skin: warm and dry    Labs:  Lab Results   Component Value Date    WBC 14.9 (H) 09/04/2024    HGB 8.0 (L) 09/04/2024    HCT 26.1 (L) 09/04/2024    MCV 93 09/04/2024     09/04/2024     Lab Results   Component Value Date    GLUCOSE 91 09/04/2024    CALCIUM 8.5 (L) 09/04/2024     09/04/2024    K 4.2 09/04/2024    CO2 33 (H) 09/04/2024    CL 93 (L) 09/04/2024    BUN 42 (H) 09/04/2024    CREATININE 2.69 (H) 09/04/2024     Lab Results   Component Value Date    ALT 21 08/30/2024    AST 20 08/30/2024    ALKPHOS 118 08/30/2024    BILITOT 0.9 08/30/2024        Imaging:  === 08/25/24 ===  US LIVER WITH DOPPLER    - Impression -  1. Moderate volume ascites.  2. Hepatomegaly with diffusely increased echogenicity of the liver which may be seen in the setting of steatosis. Correlate clinically  with liver enzymes.  3. Unremarkable Doppler interrogation of the liver.  4. Incidental note of a right pleural effusion.    I personally reviewed the images/study and I agree with the findings  as stated by Gaby Montoya MD. This study was interpreted at  [place], [place].          Signed by: Enrique Ayala 8/25/2024 1:24 PM  Dictation workstation:   [id number]    Cardiology Procedures:       CONCLUSIONS:   1. The left ventricular systolic function is severely decreased, with a visually estimated ejection fraction of 15-20%.   2. There is global hypokinesis of the left ventricle with minor regional variations.   3. Spectral Doppler shows a pseudonormal pattern of left ventricular diastolic filling.   4. There is an elevated mean left atrial pressure.   5. Left ventricular cavity size is severely dilated.   6. No left ventricular thrombus visualized.   7. There is mild eccentric left ventricular hypertrophy.   8. There is low normal right ventricular systolic function.   9. Moderately enlarged right ventricle.  10. The left atrium is severely dilated.  11. The right atrium is severely dilated.  12. Moderate mitral valve regurgitation.  13. Moderate to severe tricuspid regurgitation visualized.  14. Aortic valve sclerosis.  15. Mild to moderately elevated pulmonary artery pressure.  16. There is no evidence of a patent foramen ovale.      No GI procedures available for review    Assessment and Plan:        Dinesh Mccall is a 79-year-old male with an unknown past medical history who presented to the [facility] ED after falling at home. The patient presented with lower extremity edema concerning for new-onset CHF. A TTE performed  on 8/25 revealed global hypokinesis of the left ventricle and an estimated EF of 15-20%. There are additional concerns for upper GI bleeding with melena and a hemoglobin drop from 7.2 to 6.6 following the use of Bactrim for cellulitis, for which gastroenterology has been consulted. Initially, EGD was deferred due to acute decompensated heart failure. He was managed conservatively with PPI and melena has resolved. Hb continues to increase. Patient is not amenable to EGD at this time as he feels his cardiac status is not yet optimized.    Recommendations:  - defer EGD until cardiac work up is complete (including stress test) and cardiopulmonary status optimized. Ideally, patient should be euvolemic prior to elective procedures  - Continue PPI BID  - Monitor hb and for signs of GI bleeding; transfuse for hgb of <8  - Consider EGD inpatient versus outpatient when optimized. Patient can be referred to GI clinic with Dr. Carpenter after discharge.      Recommendations communicated with the primary team via secure chat.  Patient seen and discussed with attending, Dr. Edison Cummings.     Jessie Carpenter MD     Thank you for the consultation.  The consulting team will sign off Please do not hesitate to contact us again on by paging the consultation team again between the weekday hours of 7 AM - 5 PM. If there is an urgent concern during the weekend, after-hours, or holidays; then please page the [place] GI fellow at [phone]. Thank you.      SIGNATURE: Jessie Carpenter MD PATIENT NAME: Dinesh Mccall   DATE: September 4, 2024 MRN: [id number]

## 2024-09-04 NOTE — PROGRESS NOTES
09/04/24 1453   Discharge Planning   Living Arrangements Alone   Support Systems Friends/neighbors   Assistance Needed needs assistance   Type of Residence Private residence   Number of Stairs to Enter Residence 0   Number of Stairs Within Residence 0   Do you have animals or pets at home? No   Who is requesting discharge planning? Provider   Home or Post Acute Services None   Expected Discharge Disposition Home   Does the patient need discharge transport arranged? No   Financial Resource Strain   How hard is it for you to pay for the very basics like food, housing, medical care, and heating? Not very   Housing Stability   In the last 12 months, was there a time when you were not able to pay the mortgage or rent on time? N   In the past 12 months, how many times have you moved where you were living? 1   At any time in the past 12 months, were you homeless or living in a shelter (including now)? N   Transportation Needs   In the past 12 months, has lack of transportation kept you from meetings, work, or from getting things needed for daily living? No   Patient Choice   Provider Choice list and CMS website (https://medicare.gov/care-compare#search) for post-acute Quality and Resource Measure Data were provided and reviewed with: Patient   Patient / Family choosing to utilize agency / facility established prior to hospitalization No     Transitional Care Coordinator Note:  9/4/2024@9:00 Met with patient to introduce myself, role and discuss discharge planning s/p admission.  Patient lives with alone  Demographics and contact information confirmed.  Will continue to monitor patient for all home going needs.  Chadd Graves RN [facility] [phone]              Previous Home Care  None   Falls: none   O2/Cpap-None   Dialysis None   Transportation at discharge- (Roundtrip)

## 2024-09-04 NOTE — PROGRESS NOTES
Dinesh Mccall is a 79 y.o. male on day 10 of admission presenting with Acute HFrEF (heart failure with reduced ejection fraction) (Multi).    JJ met with pt at bedside to discuss SNF options. Pt said he was fine with whatever his siblings thought. SW told him Ivory thought [facility] may be best, and he was agreeable.    JJ called Ivory to discuss options and she said she would prefer [facility]. Ivory asked about the pt's ADOD and the fluid in pt's chest. JJ stated she would ask team and update her. Ivory wanted to talk to facility regarding finances, which they later called her. According to his sister, the pt Ivory does not feel that the pt will be able to return home after, but she knows that is what he wants. JJ explained that [facility] does not have any LTC beds at this time, but may once pt is ready. SW explained that if this is not the case, the facility will help organize next steps if needed. Ivory understood. Ivory later called to discuss HCPOA paperwork and wanting that to be discussed. JJ explained she can complete this with pt and send a copy to her email.    SW to follow.    Linda Nogueira MSW [facility]  Care Transitions  2  [phone] or Epic Secure Chat

## 2024-09-05 LAB
ALBUMIN SERPL BCP-MCNC: 2.7 G/DL (ref 3.4–5)
ALBUMIN SERPL BCP-MCNC: 2.8 G/DL (ref 3.4–5)
ALBUMIN SERPL BCP-MCNC: 3 G/DL (ref 3.4–5)
ANION GAP SERPL CALC-SCNC: 15 MMOL/L (ref 10–20)
BASOPHILS # BLD AUTO: 0.04 X10*3/UL (ref 0–0.1)
BASOPHILS NFR BLD AUTO: 0.3 %
BNP SERPL-MCNC: 1448 PG/ML (ref 0–99)
BNP SERPL-MCNC: 1464 PG/ML (ref 0–99)
BUN SERPL-MCNC: 43 MG/DL (ref 6–23)
BUN SERPL-MCNC: 44 MG/DL (ref 6–23)
BUN SERPL-MCNC: 44 MG/DL (ref 6–23)
CALCIUM SERPL-MCNC: 7.8 MG/DL (ref 8.6–10.6)
CALCIUM SERPL-MCNC: 8.1 MG/DL (ref 8.6–10.6)
CALCIUM SERPL-MCNC: 8.1 MG/DL (ref 8.6–10.6)
CHLORIDE SERPL-SCNC: 86 MMOL/L (ref 98–107)
CHLORIDE SERPL-SCNC: 87 MMOL/L (ref 98–107)
CHLORIDE SERPL-SCNC: 87 MMOL/L (ref 98–107)
CO2 SERPL-SCNC: 35 MMOL/L (ref 21–32)
CO2 SERPL-SCNC: 35 MMOL/L (ref 21–32)
CO2 SERPL-SCNC: 36 MMOL/L (ref 21–32)
CREAT SERPL-MCNC: 2.45 MG/DL (ref 0.5–1.3)
CREAT SERPL-MCNC: 2.58 MG/DL (ref 0.5–1.3)
CREAT SERPL-MCNC: 2.59 MG/DL (ref 0.5–1.3)
EGFRCR SERPLBLD CKD-EPI 2021: 25 ML/MIN/1.73M*2
EGFRCR SERPLBLD CKD-EPI 2021: 25 ML/MIN/1.73M*2
EGFRCR SERPLBLD CKD-EPI 2021: 26 ML/MIN/1.73M*2
EOSINOPHIL # BLD AUTO: 0.16 X10*3/UL (ref 0–0.4)
EOSINOPHIL NFR BLD AUTO: 1.4 %
ERYTHROCYTE [DISTWIDTH] IN BLOOD BY AUTOMATED COUNT: 15.4 % (ref 11.5–14.5)
GLUCOSE SERPL-MCNC: 135 MG/DL (ref 74–99)
GLUCOSE SERPL-MCNC: 86 MG/DL (ref 74–99)
GLUCOSE SERPL-MCNC: 93 MG/DL (ref 74–99)
HCT VFR BLD AUTO: 25.5 % (ref 41–52)
HGB BLD-MCNC: 7.8 G/DL (ref 13.5–17.5)
IMM GRANULOCYTES # BLD AUTO: 0.11 X10*3/UL (ref 0–0.5)
IMM GRANULOCYTES NFR BLD AUTO: 0.9 % (ref 0–0.9)
LACTATE SERPL-SCNC: 2.2 MMOL/L (ref 0.4–2)
LACTATE SERPL-SCNC: 2.6 MMOL/L (ref 0.4–2)
LYMPHOCYTES # BLD AUTO: 0.87 X10*3/UL (ref 0.8–3)
LYMPHOCYTES NFR BLD AUTO: 7.3 %
MAGNESIUM SERPL-MCNC: 1.9 MG/DL (ref 1.6–2.4)
MAGNESIUM SERPL-MCNC: 1.95 MG/DL (ref 1.6–2.4)
MCH RBC QN AUTO: 29 PG (ref 26–34)
MCHC RBC AUTO-ENTMCNC: 30.6 G/DL (ref 32–36)
MCV RBC AUTO: 95 FL (ref 80–100)
MONOCYTES # BLD AUTO: 0.91 X10*3/UL (ref 0.05–0.8)
MONOCYTES NFR BLD AUTO: 7.7 %
NEUTROPHILS # BLD AUTO: 9.76 X10*3/UL (ref 1.6–5.5)
NEUTROPHILS NFR BLD AUTO: 82.4 %
NRBC BLD-RTO: 0 /100 WBCS (ref 0–0)
PHOSPHATE SERPL-MCNC: 4.5 MG/DL (ref 2.5–4.9)
PHOSPHATE SERPL-MCNC: 4.6 MG/DL (ref 2.5–4.9)
PHOSPHATE SERPL-MCNC: 4.8 MG/DL (ref 2.5–4.9)
PLATELET # BLD AUTO: 251 X10*3/UL (ref 150–450)
POTASSIUM SERPL-SCNC: 2.7 MMOL/L (ref 3.5–5.3)
POTASSIUM SERPL-SCNC: 4.1 MMOL/L (ref 3.5–5.3)
POTASSIUM SERPL-SCNC: 4.7 MMOL/L (ref 3.5–5.3)
RBC # BLD AUTO: 2.69 X10*6/UL (ref 4.5–5.9)
SODIUM SERPL-SCNC: 132 MMOL/L (ref 136–145)
SODIUM SERPL-SCNC: 132 MMOL/L (ref 136–145)
SODIUM SERPL-SCNC: 135 MMOL/L (ref 136–145)
WBC # BLD AUTO: 11.9 X10*3/UL (ref 4.4–11.3)

## 2024-09-05 PROCEDURE — 2500000004 HC RX 250 GENERAL PHARMACY W/ HCPCS (ALT 636 FOR OP/ED)

## 2024-09-05 PROCEDURE — 80069 RENAL FUNCTION PANEL: CPT

## 2024-09-05 PROCEDURE — 83735 ASSAY OF MAGNESIUM: CPT

## 2024-09-05 PROCEDURE — 83880 ASSAY OF NATRIURETIC PEPTIDE: CPT

## 2024-09-05 PROCEDURE — 99233 SBSQ HOSP IP/OBS HIGH 50: CPT

## 2024-09-05 PROCEDURE — 2060000001 HC INTERMEDIATE ICU ROOM DAILY

## 2024-09-05 PROCEDURE — 36415 COLL VENOUS BLD VENIPUNCTURE: CPT

## 2024-09-05 PROCEDURE — 83605 ASSAY OF LACTIC ACID: CPT

## 2024-09-05 PROCEDURE — 2500000002 HC RX 250 W HCPCS SELF ADMINISTERED DRUGS (ALT 637 FOR MEDICARE OP, ALT 636 FOR OP/ED)

## 2024-09-05 PROCEDURE — 2500000001 HC RX 250 WO HCPCS SELF ADMINISTERED DRUGS (ALT 637 FOR MEDICARE OP)

## 2024-09-05 PROCEDURE — 85025 COMPLETE CBC W/AUTO DIFF WBC: CPT

## 2024-09-05 RX ORDER — FUROSEMIDE 10 MG/ML
160 INJECTION INTRAMUSCULAR; INTRAVENOUS ONCE
Status: COMPLETED | OUTPATIENT
Start: 2024-09-05 | End: 2024-09-05

## 2024-09-05 RX ORDER — POTASSIUM CHLORIDE 14.9 MG/ML
20 INJECTION INTRAVENOUS
Status: COMPLETED | OUTPATIENT
Start: 2024-09-05 | End: 2024-09-05

## 2024-09-05 RX ORDER — POTASSIUM CHLORIDE 20 MEQ/1
40 TABLET, EXTENDED RELEASE ORAL ONCE
Status: COMPLETED | OUTPATIENT
Start: 2024-09-05 | End: 2024-09-05

## 2024-09-05 ASSESSMENT — COGNITIVE AND FUNCTIONAL STATUS - GENERAL
DRESSING REGULAR LOWER BODY CLOTHING: A LOT
DRESSING REGULAR LOWER BODY CLOTHING: A LOT
TOILETING: A LOT
DRESSING REGULAR UPPER BODY CLOTHING: A LITTLE
STANDING UP FROM CHAIR USING ARMS: A LOT
MOVING TO AND FROM BED TO CHAIR: A LOT
DAILY ACTIVITIY SCORE: 15
CLIMB 3 TO 5 STEPS WITH RAILING: TOTAL
STANDING UP FROM CHAIR USING ARMS: A LOT
TURNING FROM BACK TO SIDE WHILE IN FLAT BAD: A LOT
PERSONAL GROOMING: A LOT
WALKING IN HOSPITAL ROOM: A LOT
DRESSING REGULAR UPPER BODY CLOTHING: A LOT
MOVING FROM LYING ON BACK TO SITTING ON SIDE OF FLAT BED WITH BEDRAILS: A LOT
MOVING FROM LYING ON BACK TO SITTING ON SIDE OF FLAT BED WITH BEDRAILS: A LOT
CLIMB 3 TO 5 STEPS WITH RAILING: A LOT
TURNING FROM BACK TO SIDE WHILE IN FLAT BAD: A LOT
HELP NEEDED FOR BATHING: A LOT
MOBILITY SCORE: 12
EATING MEALS: A LITTLE
DAILY ACTIVITIY SCORE: 14
WALKING IN HOSPITAL ROOM: TOTAL
PERSONAL GROOMING: A LITTLE
TOILETING: A LOT
HELP NEEDED FOR BATHING: A LOT
MOVING TO AND FROM BED TO CHAIR: A LOT
MOBILITY SCORE: 10

## 2024-09-05 ASSESSMENT — PAIN SCALES - GENERAL
PAINLEVEL_OUTOF10: 5 - MODERATE PAIN
PAINLEVEL_OUTOF10: 4
PAINLEVEL_OUTOF10: 7
PAINLEVEL_OUTOF10: 7

## 2024-09-05 ASSESSMENT — PAIN - FUNCTIONAL ASSESSMENT
PAIN_FUNCTIONAL_ASSESSMENT: 0-10

## 2024-09-05 ASSESSMENT — PAIN DESCRIPTION - DESCRIPTORS: DESCRIPTORS: DISCOMFORT

## 2024-09-05 NOTE — PROGRESS NOTES
Internal Medicine Progress Note      Subjective   Subjective   Pt was seen at the bedside this morning. On 2L NC. Denies any bloody bowel movements for the past few days. States he would still have significant difficulty breathing if he were to lay flat in the bed. Net negative fluid balance 3.1L yesterday.    Objective     Vitals: I/O:   Vitals:    09/05/24 1224   BP: 111/58   Pulse:    Resp:    Temp: 36.3 °C (97.3 °F)   SpO2: 98%      24hr Min/Max:  Temp  Min: 35.2 °C (95.4 °F)  Max: 36.9 °C (98.4 °F)  Pulse  Min: 62  Max: 78  BP  Min: 100/69  Max: 126/80  Resp  Min: 15  Max: 32  SpO2  Min: 91 %  Max: 100 %   Intake/Output Summary (Last 24 hours) at 9/5/2024 1529  Last data filed at 9/5/2024 1324  Gross per 24 hour   Intake 800 ml   Output 3625 ml   Net -2825 ml         Physical Exam:  General: Awake, alert, orientated. not in any acute distress  HEENT: no scleral icterus or conjunctivitis  Chest: CTAB, normal respiratory effort, no wheezes or crackles, on 2L NC supplemental oxygen. JVD noted to mid neck  Cardiac: Soft heart sounds, RRR, no Murmurs or rubs.   Abdomen: Soft, Distended, NT  EXT: 1+ peripheral edema bilaterally, no asymmetry noted. Chronic skin changes bilaterally. Peeling of skin on left leg. left arm with notable edema.   MSK: No focal joint swelling noted  Skin: Ecchymosis of left arm, most prominent around elbow and forearm.   Neuro: AOx4, moving all limbs spontaneously, follows commands  Psych: Coherent thought process, appropriate mood and affect    General Chemistry Labs    Results from last 72 hours   Lab Units 09/05/24  1324 09/05/24  0705 09/04/24  2109   GLUCOSE mg/dL 135* 86 135*   SODIUM mmol/L 132* 135* 133*   POTASSIUM mmol/L 4.7 2.7* 3.1*   CHLORIDE mmol/L 87* 87* 88*   CO2 mmol/L 35* 36* 28   BUN mg/dL 44* 44* 43*   CREATININE mg/dL 2.58* 2.59* 2.68*   ANION GAP mmol/L 15 15 20   EGFR mL/min/1.73m*2 25* 25* 24*   CALCIUM mg/dL 7.8* 8.1* 8.2*   PHOSPHORUS mg/dL 4.5 4.8 4.3   MAGNESIUM  "mg/dL 1.95 1.90 2.22   ALBUMIN g/dL 2.7* 2.8* 3.3*      CBC  Results from last 72 hours   Lab Units 09/05/24  0705 09/04/24  1626 09/04/24  0644 09/02/24  2145   WBC AUTO x10*3/uL 11.9* 13.7* 14.9* 12.3*   HEMOGLOBIN g/dL 7.8* 8.9* 8.0* 7.6*   HEMATOCRIT % 25.5* 30.0* 26.1* 24.5*   MCV fL 95 95 93 92   MCH pg 29.0 28.3 28.6 28.6   MCHC g/dL 30.6* 29.7* 30.7* 31.0*   RDW % 15.4* 15.4* 15.8* 15.3*   PLATELETS AUTO x10*3/uL 251 287 268 236   NEUTROS PCT AUTO % 82.4  --  82.8 84.8   LYMPHS PCT AUTO % 7.3  --  7.7 6.5   MONOS PCT AUTO % 7.7  --  7.3 7.2   EOS PCT AUTO % 1.4  --  0.9 0.2     Coagulation Labs  Results from last 72 hours   Lab Units 09/02/24  2145   INR  1.4*   PROTIME seconds 16.2*   APTT seconds 73*     Cardiac Labs  Results from last 72 hours   Lab Units 09/05/24  0705 09/04/24  1626   BNP pg/mL 1,464* 1,975*     Micro/ID:   No results found for: \"URINECULTURE\", \"BLOODCULT\", \"CSFCULTSMEAR\"          Scheduled Medications:  PRN Medications:  Continuous Medications:   [Held by provider] enoxaparin, 30 mg, subcutaneous, q24h  folic acid, 1 mg, oral, Daily  hydrALAZINE, 10 mg, oral, TID  isosorbide mononitrate ER, 30 mg, oral, Daily  multivitamin with minerals, 1 tablet, oral, Daily  pantoprazole, 40 mg, intravenous, q12h [facility]  perflutren protein A microsphere, 0.5 mL, intravenous, Once in imaging  ramelteon, 8 mg, oral, Nightly  sulfur hexafluoride microsphr, 2 mL, intravenous, Once in imaging  thiamine, 100 mg, oral, Daily     PRN medications: acetaminophen, oxyCODONE, polyethylene glycol, sodium chloride, white petrolatum furosemide, 30 mg/hr, Last Rate: Stopped (09/04/24 1505)         Summary of key imaging results from the last 24 hours:    Vascular US upper extremity venous duplex left    Result Date: 9/5/2024            [place] [address]   Tel [phone] and Fax [phone]  Vascular Lab Report VASC US UPPER EXTREMITY VENOUS DUPLEX LEFT  Patient Name: "      DINESH Santacruz Physician: [id number] Walker Dodge DO KAUFMAN Study Date:        9/5/2024           Ordering           [id number] RUSSELL ROTHMAN                                       Physician:         ROSALINE MRN/PID:           [id number]           Technologist:      Rodríguez Artis RVT Accession#:        [id number]       Technologist 2: Date of Birth/Age: 1945 / 79      Encounter#:        [id number]                    years Gender:            M Admission Status:  Inpatient          Location           [facility]                                       Performed:  Diagnosis/ICD: Left arm swelling-M79.89 CPT Codes:     22572 Peripheral venous duplex scan for DVT Limited  CONCLUSIONS: Right Upper Venous: The subclavian vein demonstrates a normal spontaneous and phasic flow. Left Upper Venous: No evidence of acute deep vein thrombus visualized in the left upper extremity. Edema noted in the arm.  Imaging & Doppler Findings:  Right        Flow Subclavian Pulsatile  Left                Compress Thrombus   Flow Internal Jugular      Yes      None   Pulsatile Subclavian Proximal            None   Pulsatile Subclavian Mid        Yes      None   Pulsatile Subclavian Distal     Yes      None   Pulsatile Axillary              Yes      None   Pulsatile Brachial              Yes      None Cephalic              Yes      None Basilic               Yes      None  [id number] Walker Dodge DO Electronically signed by [id number] Walker Dodge DO on 9/5/2024 at 12:51:05 PM  ** Final **          Assessment and Plan    Dinesh Kaufman is a 79 year old man with unknown medical history presenting for fall with in apparent acute decompensated heart failure of unclear etiology with mild respiratory distress. BNP was 2182, and TTE revealed 15-20%, global hypokinesia of LV, elevated mean LAP, moderate MR, moderate/severe TR, moderately enlarged RV, mild/moderately elevated PAP. He also had severe bilateral LE edema with chronic  skin changes and L dorsal mid foot cellulitis likely secondary to ulceration per CT read for which he completed a course of abx. For the ADHF, he was started on a lasix gtt with significant improvement to his dyspnea and BLE /edema, now on 4 L NC. He is currently on lasix drip and On IV pantoprazole bid due to UGIB. Transferred to Caridiology service for continued management of decompensated heart failure.     Updates 09/05/24:  -RHC ordered to assess volume status, specific request to obtain portal venous gradient.   - lasix gtt stopped yesterday evening. Restarted this morning. Given IV lasix 160mg bolus as well.   - RFP three times a day while being diuresed   - Fluid restriction 1.5L  - Followed up with GI and will continue to defer EGD until after cardiac MRI with stress.  - DVT LUE shows not DVT      #Acute Decompensated Systolic Heart Failure (EF 15-20%), unclear etiology, Stage C NYHA class III  #RBBB  #Suspected Pulmonary HTN Type 2  -BNP 2182  -Admission weight 230lb, Current weight 210lbs. Dry weight unknown  -CXR 9/3: Questionable slight interval worsening in right-sided effusion/edema.   -TTE 8/26: LVEF 15-20%, global hypokinesia of LV, elevated mean LAP, moderate MR, moderate/severe TR, moderately enlarged RV, mild/moderately elevated PAP   -Liver US 8/25 showed moderate ascites with hepatomegaly  - Cardiology was consulted in past and recommended cardiac MRI stress. Thought etiology is related to post viral syndrome vs ischemic cardiomyopathy. Unable to get cardiac MRI due to Hgb < 9 since admission  Plan:  -Restart Lasix gtt 30 mg/hr, IV lasix 160 mg bolus this morning   -Goal net negative 2L fluid balance   -strict I/Os   -1.5 L fluid restriction   -RFP 3x a day while being diuressed  -RHC ordered to assess volume status, specific request for portal venous gradient   -GDMT:            - BB: defer for now iso ADHF            - ACE/ARB/ARNI: defer for now iso KIA            - MRA: defer for now iso  KIA            - SGLT2-inhibitor: defer for now. Iso KIA             - Hydralazine 10 mg PO and Imdur 30 mg daily, Goal BP < 140      #Melena, improving  #Anemia, (Hgb 7-8 while in hospital)  -anemia etiology likely iron deficiency anemia with compounded GI bleed.  -GI consulted on admission but deferred EGD due to decompensated heart failure and until cardiac workup is complete.   - Reports taking 6-8 Advil daily for a year  - Iron 28, Transferrin 263, TIBC sat 8%, ferritin 40 > Iron deficiency  - Haptoglobin 167 and   - last melena noted on 9/2, bowel movement without melena on 9/3  - consented for blood   - total received 4 units pRBCs during this admission.   Plan:  - holding lovenox due to GI bleed  - transfuse for Hgb>8 due to GI bleed per GI recommendations   - Transfuse for Hgb > 9 for cardiac MRI with regadenoson when ischemic evaluation is to be obtained  - Continue IV pantoprazole 40mg BID  - Followed up with GI since patient is clinically improving in regards to decompensated heart failure. They will continue to defer EGD since patient is high risk until cardiac MRI is done      #KIA, improving  - etiology presumed cardiorenal due to decompensated heart failure.   - No baseline Cr available   - Admission Cr. 2.2, peak 3.08, now at 2.69  -urine lytes 8/25: urine Na <10, urine Cr 82.3, Urine K/Cr 60   Plan:  -Restart diuresis with lasix gtt at 30mg/hr along with IV 160mg lasix bolus  -avoid nephrotoxic agents  -CTM creatinine  -RFP 3x a day while being diuresed  -replete electrolytes for K>4 and Mg>2    #LUE edema and ecchymosis  - no significant trauma  - new edema and ecchymosis occurred while in hospital  - mild pain with movement at the elbow  - PT 16.2, INR 1.4, PTT 73  - Doppler US of LUE no DVT  Plan:  -Ace band and cold compress  -CTM  -pain regimen: Tylenol 650 mg prn and Oxycodone 5mg prn      #Insomnia  -was on Melatonin 6 mg nighty with continued difficulty sleeping  - Qtc prolongation  noted on EKG  - trazodone, mirtazapine not suggested.   -Switched to ramelteon with improvement in sleep quality  Plan:  - continue ramelteon 8mg nightly    #LLE cellulitis, resolved  -Unilateral erythema on LLE. No pus noted.   - X ray of L foot concerning for soft tissue gas  - doppler LE negative for DVT  - CT foot negative for deep space infection or osteomyelitis  - was treated with Vanc and Zosyn and then Vanc and Ceftriaxone from 8/24-8/26  -switched to Bactrim 800mg on 8/26 for a 5 day course, completed    # Disposition: SNF/acute rehab facility  Patient likely not safe to go home for now. Will need social work and APS check on his house.    Medical Check List   FEN  -Fluids:  PRN cautiously given HFrEF 15-20%  -Electrolytes: PRN, with goals of Mg >2, K>4  -Nutrition: Cardiac diet  Prophylaxis:  -DVT ppx: Held iso melena  -GI ppx/Bowel care: IV Pantoprazole 40mg BID   -Abx: None  -Pain regimen: Tylenol 650mg and Oxycodone 5mg prn  Hardware:  -Drains: Garcia  -Lines: PIV  Social:  -Code: Full Code  -NOK/Surrogate Decision Maker: Skyla Kincaid (Sister) [phone] or [phone]             Ivory Oliva [phone]       Patient seen and staffed with attending provider Dr. Marshall.  Martín Jackson MD  Internal Medicine PGY-1    Disclaimer: Documentation completed with the information available at the time of input. The times in the chart may not be reflective of actual patient care times, interventions, or procedures. Documentation occurs after the physical care of the patient.

## 2024-09-05 NOTE — CARE PLAN
Problem: Skin  Goal: Prevent/minimize sheer/friction injuries  Outcome: Progressing  Flowsheets (Taken 9/5/2024 1703)  Prevent/minimize sheer/friction injuries: Turn/reposition every 2 hours/use positioning/transfer devices     Problem: Pain - Adult  Goal: Verbalizes/displays adequate comfort level or baseline comfort level  Outcome: Progressing     Problem: Heart Failure  Goal: Improved urinary output this shift  Outcome: Progressing     Problem: Fall/Injury  Goal: Not fall by end of shift  Outcome: Progressing     Patient remained stable. Potassium repleted. Diuresing with IV lasix. Up in chair this shift. Plan is for NPO at midnight for RHC tomorrow.

## 2024-09-05 NOTE — PROGRESS NOTES
SW received a message from pt's previous SW at  that pt's sister, Ivory has some questions and issues with pt's placement. SW called Ivory, and she reported she and other siblings have been working on pt's dc placement, and their [facility] is [facility], not [facility]. Ivory requests Care Transition team discuss with pt for dc placement, and also medical team for updates. SW messaged to TCC and MD with her requests, and they will contact Ivory and pt to assist. SW will continue to follow and assist.       Mirella Trujillo, MSSA, LSW

## 2024-09-05 NOTE — PROGRESS NOTES
Physical Therapy    Therapy Communication Note    Patient Name: Dinesh Mccall  MRN: [id number]  Today's Date: 9/5/2024       Discipline: Physical Therapy      Missed Visit: Yes  Missed Visit Reason:  (pt leaving the floor for ultrasound)      09/05/24 at 9:33 AM   Allison Boothe, PT

## 2024-09-05 NOTE — PROGRESS NOTES
Occupational Therapy                 Therapy Communication Note    Patient Name: Dinesh Mccall  MRN: [id number]  Today's Date: 9/5/2024     Discipline: Occupational Therapy    Missed Visit Reason: Missed Visit Reason: Other (Comment) (Held OT tx d/t low potassium (2.7). Will re-attempt OT tx when medically appropiate and as schedule permits.)    Missed Time: Attempt at 1115    Comment:

## 2024-09-06 LAB
ABO GROUP (TYPE) IN BLOOD: NORMAL
ALBUMIN SERPL BCP-MCNC: 3 G/DL (ref 3.4–5)
ALBUMIN SERPL BCP-MCNC: 3.2 G/DL (ref 3.4–5)
ANION GAP SERPL CALC-SCNC: 12 MMOL/L (ref 10–20)
ANION GAP SERPL CALC-SCNC: 13 MMOL/L (ref 10–20)
ANION GAP SERPL CALC-SCNC: 14 MMOL/L (ref 10–20)
ANTIBODY SCREEN: NORMAL
BASOPHILS # BLD AUTO: 0.04 X10*3/UL (ref 0–0.1)
BASOPHILS NFR BLD AUTO: 0.3 %
BUN SERPL-MCNC: 43 MG/DL (ref 6–23)
BUN SERPL-MCNC: 44 MG/DL (ref 6–23)
BUN SERPL-MCNC: 45 MG/DL (ref 6–23)
CALCIUM SERPL-MCNC: 8.2 MG/DL (ref 8.6–10.6)
CALCIUM SERPL-MCNC: 8.4 MG/DL (ref 8.6–10.6)
CALCIUM SERPL-MCNC: 8.4 MG/DL (ref 8.6–10.6)
CARDIAC TROPONIN I PNL SERPL HS: 112 NG/L (ref 0–53)
CHLORIDE SERPL-SCNC: 82 MMOL/L (ref 98–107)
CHLORIDE SERPL-SCNC: 83 MMOL/L (ref 98–107)
CHLORIDE SERPL-SCNC: 84 MMOL/L (ref 98–107)
CO2 SERPL-SCNC: 39 MMOL/L (ref 21–32)
CO2 SERPL-SCNC: 40 MMOL/L (ref 21–32)
CO2 SERPL-SCNC: 42 MMOL/L (ref 21–32)
CREAT SERPL-MCNC: 2.47 MG/DL (ref 0.5–1.3)
CREAT SERPL-MCNC: 2.56 MG/DL (ref 0.5–1.3)
CREAT SERPL-MCNC: 2.62 MG/DL (ref 0.5–1.3)
EGFRCR SERPLBLD CKD-EPI 2021: 24 ML/MIN/1.73M*2
EGFRCR SERPLBLD CKD-EPI 2021: 25 ML/MIN/1.73M*2
EGFRCR SERPLBLD CKD-EPI 2021: 26 ML/MIN/1.73M*2
EOSINOPHIL # BLD AUTO: 0.12 X10*3/UL (ref 0–0.4)
EOSINOPHIL NFR BLD AUTO: 1 %
ERYTHROCYTE [DISTWIDTH] IN BLOOD BY AUTOMATED COUNT: 15.5 % (ref 11.5–14.5)
GLUCOSE SERPL-MCNC: 106 MG/DL (ref 74–99)
GLUCOSE SERPL-MCNC: 137 MG/DL (ref 74–99)
GLUCOSE SERPL-MCNC: 95 MG/DL (ref 74–99)
HCT VFR BLD AUTO: 26.6 % (ref 41–52)
HGB BLD-MCNC: 8 G/DL (ref 13.5–17.5)
IMM GRANULOCYTES # BLD AUTO: 0.09 X10*3/UL (ref 0–0.5)
IMM GRANULOCYTES NFR BLD AUTO: 0.8 % (ref 0–0.9)
LYMPHOCYTES # BLD AUTO: 0.83 X10*3/UL (ref 0.8–3)
LYMPHOCYTES NFR BLD AUTO: 7.1 %
MAGNESIUM SERPL-MCNC: 1.8 MG/DL (ref 1.6–2.4)
MCH RBC QN AUTO: 29 PG (ref 26–34)
MCHC RBC AUTO-ENTMCNC: 30.1 G/DL (ref 32–36)
MCV RBC AUTO: 96 FL (ref 80–100)
MONOCYTES # BLD AUTO: 0.85 X10*3/UL (ref 0.05–0.8)
MONOCYTES NFR BLD AUTO: 7.3 %
NEUTROPHILS # BLD AUTO: 9.72 X10*3/UL (ref 1.6–5.5)
NEUTROPHILS NFR BLD AUTO: 83.5 %
NRBC BLD-RTO: 0 /100 WBCS (ref 0–0)
PHOSPHATE SERPL-MCNC: 3.2 MG/DL (ref 2.5–4.9)
PHOSPHATE SERPL-MCNC: 3.8 MG/DL (ref 2.5–4.9)
PLATELET # BLD AUTO: 250 X10*3/UL (ref 150–450)
POTASSIUM SERPL-SCNC: 2.6 MMOL/L (ref 3.5–5.3)
POTASSIUM SERPL-SCNC: 2.8 MMOL/L (ref 3.5–5.3)
POTASSIUM SERPL-SCNC: 3 MMOL/L (ref 3.5–5.3)
RBC # BLD AUTO: 2.76 X10*6/UL (ref 4.5–5.9)
RH FACTOR (ANTIGEN D): NORMAL
SODIUM SERPL-SCNC: 132 MMOL/L (ref 136–145)
SODIUM SERPL-SCNC: 134 MMOL/L (ref 136–145)
SODIUM SERPL-SCNC: 134 MMOL/L (ref 136–145)
WBC # BLD AUTO: 11.7 X10*3/UL (ref 4.4–11.3)

## 2024-09-06 PROCEDURE — 2500000004 HC RX 250 GENERAL PHARMACY W/ HCPCS (ALT 636 FOR OP/ED)

## 2024-09-06 PROCEDURE — 2500000001 HC RX 250 WO HCPCS SELF ADMINISTERED DRUGS (ALT 637 FOR MEDICARE OP): Performed by: STUDENT IN AN ORGANIZED HEALTH CARE EDUCATION/TRAINING PROGRAM

## 2024-09-06 PROCEDURE — 2500000002 HC RX 250 W HCPCS SELF ADMINISTERED DRUGS (ALT 637 FOR MEDICARE OP, ALT 636 FOR OP/ED)

## 2024-09-06 PROCEDURE — 86901 BLOOD TYPING SEROLOGIC RH(D): CPT

## 2024-09-06 PROCEDURE — 80048 BASIC METABOLIC PNL TOTAL CA: CPT | Mod: CCI

## 2024-09-06 PROCEDURE — 99233 SBSQ HOSP IP/OBS HIGH 50: CPT

## 2024-09-06 PROCEDURE — 84484 ASSAY OF TROPONIN QUANT: CPT

## 2024-09-06 PROCEDURE — 2060000001 HC INTERMEDIATE ICU ROOM DAILY

## 2024-09-06 PROCEDURE — 36415 COLL VENOUS BLD VENIPUNCTURE: CPT

## 2024-09-06 PROCEDURE — 85025 COMPLETE CBC W/AUTO DIFF WBC: CPT

## 2024-09-06 PROCEDURE — 2500000001 HC RX 250 WO HCPCS SELF ADMINISTERED DRUGS (ALT 637 FOR MEDICARE OP)

## 2024-09-06 PROCEDURE — 80069 RENAL FUNCTION PANEL: CPT

## 2024-09-06 PROCEDURE — 83735 ASSAY OF MAGNESIUM: CPT

## 2024-09-06 PROCEDURE — 86923 COMPATIBILITY TEST ELECTRIC: CPT

## 2024-09-06 RX ORDER — POTASSIUM CHLORIDE 750 MG/1
10 TABLET, FILM COATED, EXTENDED RELEASE ORAL 2 TIMES DAILY PRN
Status: DISCONTINUED | OUTPATIENT
Start: 2024-09-06 | End: 2024-09-09 | Stop reason: HOSPADM

## 2024-09-06 RX ORDER — POTASSIUM CHLORIDE 20 MEQ/1
20 TABLET, EXTENDED RELEASE ORAL 3 TIMES DAILY
Status: DISCONTINUED | OUTPATIENT
Start: 2024-09-06 | End: 2024-09-07

## 2024-09-06 RX ORDER — POTASSIUM CHLORIDE 20 MEQ/1
20 TABLET, EXTENDED RELEASE ORAL 3 TIMES DAILY
Status: DISCONTINUED | OUTPATIENT
Start: 2024-09-06 | End: 2024-09-06

## 2024-09-06 RX ORDER — POTASSIUM CHLORIDE 20 MEQ/1
40 TABLET, EXTENDED RELEASE ORAL 3 TIMES DAILY
Status: DISCONTINUED | OUTPATIENT
Start: 2024-09-06 | End: 2024-09-06

## 2024-09-06 RX ORDER — POTASSIUM CHLORIDE 14.9 MG/ML
20 INJECTION INTRAVENOUS ONCE
Status: DISCONTINUED | OUTPATIENT
Start: 2024-09-06 | End: 2024-09-06

## 2024-09-06 RX ORDER — POTASSIUM CHLORIDE 14.9 MG/ML
20 INJECTION INTRAVENOUS
Status: COMPLETED | OUTPATIENT
Start: 2024-09-06 | End: 2024-09-06

## 2024-09-06 RX ORDER — POTASSIUM CHLORIDE 14.9 MG/ML
20 INJECTION INTRAVENOUS
Status: COMPLETED | OUTPATIENT
Start: 2024-09-06 | End: 2024-09-07

## 2024-09-06 ASSESSMENT — COGNITIVE AND FUNCTIONAL STATUS - GENERAL
MOBILITY SCORE: 12
DRESSING REGULAR LOWER BODY CLOTHING: A LOT
TURNING FROM BACK TO SIDE WHILE IN FLAT BAD: A LOT
PERSONAL GROOMING: A LOT
WALKING IN HOSPITAL ROOM: A LOT
TOILETING: A LOT
CLIMB 3 TO 5 STEPS WITH RAILING: A LOT
STANDING UP FROM CHAIR USING ARMS: A LOT
DAILY ACTIVITIY SCORE: 14
DRESSING REGULAR UPPER BODY CLOTHING: A LOT
MOVING FROM LYING ON BACK TO SITTING ON SIDE OF FLAT BED WITH BEDRAILS: A LOT
MOVING TO AND FROM BED TO CHAIR: A LOT
HELP NEEDED FOR BATHING: A LOT

## 2024-09-06 ASSESSMENT — PAIN SCALES - GENERAL: PAINLEVEL_OUTOF10: 4

## 2024-09-06 ASSESSMENT — PAIN - FUNCTIONAL ASSESSMENT: PAIN_FUNCTIONAL_ASSESSMENT: 0-10

## 2024-09-06 NOTE — INTERVAL H&P NOTE
H&P reviewed. The patient was examined and there are no changes to the H&P.    Pending RHC today for acute HFrEF. Currently on 3L O2. He states his abdominal swelling is improving with diuresis. Patient assessed while laying near flat to determine feasibility of RHC today, and patient tolerated this positioning well with sats maintained 95-96%. All questions answered.

## 2024-09-06 NOTE — PROGRESS NOTES
Internal Medicine Progress Note      Subjective   NAEO     Pt was seen at the bedside this morning. On 2L NC. Denies any bloody bowel movements for the past few days.  He did endorse some chest pain which went down his left arm. An EKG was obtained and reviewed by Dr. Marshall with no concern for new MI. He complained of not feeling well for much of this morning and declined to have his previously scheduled RHC.     Objective     Vitals: I/O:   Vitals:    09/06/24 1154   BP: 120/65   Pulse: 74   Resp: 22   Temp: 37.5 °C (99.5 °F)   SpO2: 99%      24hr Min/Max:  Temp  Min: 36.1 °C (97 °F)  Max: 37.5 °C (99.5 °F)  Pulse  Min: 72  Max: 81  BP  Min: 104/54  Max: 143/89  Resp  Min: 19  Max: 33  SpO2  Min: 94 %  Max: 100 %   Intake/Output Summary (Last 24 hours) at 9/6/2024 1457  Last data filed at 9/6/2024 0900  Gross per 24 hour   Intake 543.85 ml   Output 4000 ml   Net -3456.15 ml         Physical Exam:  General: Awake, alert, orientated. not in any acute distress  HEENT: no scleral icterus or conjunctivitis  Chest: CTAB, normal respiratory effort, no wheezes or crackles, on 2L NC supplemental oxygen. JVD noted to mid neck  Cardiac: Soft heart sounds, RRR, no Murmurs or rubs.   Abdomen: Soft, Distended, NT  EXT: 1+ peripheral edema bilaterally, no asymmetry noted. Chronic skin changes bilaterally. Peeling of skin on left leg. left arm with notable edema.   MSK: No focal joint swelling noted  Skin: Ecchymosis of left arm, most prominent around elbow and forearm.   Neuro: AOx4, moving all limbs spontaneously, follows commands  Psych: Coherent thought process, appropriate mood and affect    General Chemistry Labs    Results from last 72 hours   Lab Units 09/06/24  0723 09/05/24  1709 09/05/24  1324 09/05/24  0705   GLUCOSE mg/dL 95 93 135* 86   SODIUM mmol/L 134* 132* 132* 135*   POTASSIUM mmol/L 2.6* 4.1 4.7 2.7*   CHLORIDE mmol/L 84* 86* 87* 87*   CO2 mmol/L 39* 35* 35* 36*   BUN mg/dL 45* 43* 44* 44*   CREATININE  "mg/dL 2.62* 2.45* 2.58* 2.59*   ANION GAP mmol/L 14 15 15 15   EGFR mL/min/1.73m*2 24* 26* 25* 25*   CALCIUM mg/dL 8.2* 8.1* 7.8* 8.1*   PHOSPHORUS mg/dL 3.8 4.6 4.5 4.8   MAGNESIUM mg/dL 1.80  --  1.95 1.90   ALBUMIN g/dL 3.0* 3.0* 2.7* 2.8*      CBC  Results from last 72 hours   Lab Units 09/06/24  0723 09/05/24  0705 09/04/24  1626 09/04/24  0644   WBC AUTO x10*3/uL 11.7* 11.9* 13.7* 14.9*   HEMOGLOBIN g/dL 8.0* 7.8* 8.9* 8.0*   HEMATOCRIT % 26.6* 25.5* 30.0* 26.1*   MCV fL 96 95 95 93   MCH pg 29.0 29.0 28.3 28.6   MCHC g/dL 30.1* 30.6* 29.7* 30.7*   RDW % 15.5* 15.4* 15.4* 15.8*   PLATELETS AUTO x10*3/uL 250 251 287 268   NEUTROS PCT AUTO % 83.5 82.4  --  82.8   LYMPHS PCT AUTO % 7.1 7.3  --  7.7   MONOS PCT AUTO % 7.3 7.7  --  7.3   EOS PCT AUTO % 1.0 1.4  --  0.9     Coagulation Labs        No lab exists for component: \"RETICHBG\"    Cardiac Labs  Results from last 72 hours   Lab Units 09/06/24  1108 09/05/24  1709 09/05/24  0705 09/04/24  1626   [facility]  --   --   --    BNP pg/mL  --  1,448* 1,464* 1,975*     Micro/ID:   No results found for: \"URINECULTURE\", \"BLOODCULT\", \"CSFCULTSMEAR\"          Scheduled Medications:  PRN Medications:  Continuous Medications:   [Held by provider] enoxaparin, 30 mg, subcutaneous, q24h  folic acid, 1 mg, oral, Daily  hydrALAZINE, 10 mg, oral, TID  isosorbide mononitrate ER, 30 mg, oral, Daily  multivitamin with minerals, 1 tablet, oral, Daily  pantoprazole, 40 mg, intravenous, q12h CORNELIO  perflutren protein A microsphere, 0.5 mL, intravenous, Once in imaging  potassium chloride CR, 20 mEq, oral, TID  ramelteon, 8 mg, oral, Nightly  sulfur hexafluoride microsphr, 2 mL, intravenous, Once in imaging  thiamine, 100 mg, oral, Daily     PRN medications: acetaminophen, oxyCODONE, polyethylene glycol, potassium chloride CR, sodium chloride, white petrolatum         Summary of key imaging results from the last 24 hours:    Vascular US upper extremity venous duplex " left    Result Date: 9/5/2024            [place] [address]   Tel [phone] and Fax [phone]  [facility]  Patient Name:      DINESH ANTHONY        Noam Physician: [id number] Walker Dodge DO                    SHAE Study Date:        9/5/2024           Ordering           [id number] RUSSELL Y                                       Physician:         ROSALINE MRN/PID:           [id number]           Technologist:      Rodríguez Artis RVT Accession#:        [id number]       Technologist 2: Date of Birth/Age: 1945 / 79      Encounter#:        [id number]                    years Gender:            M Admission Status:  Inpatient          Location           [facility]                                       Performed:  Diagnosis/ICD: Left arm swelling-M79.89 CPT Codes:     02186 Peripheral venous duplex scan for DVT Limited  CONCLUSIONS: Right Upper Venous: The subclavian vein demonstrates a normal spontaneous and phasic flow. Left Upper Venous: No evidence of acute deep vein thrombus visualized in the left upper extremity. Edema noted in the arm.  Imaging & Doppler Findings:  Right        Flow Subclavian Pulsatile  Left                Compress Thrombus   Flow Internal Jugular      Yes      None   Pulsatile Subclavian Proximal            None   Pulsatile Subclavian Mid        Yes      None   Pulsatile Subclavian Distal     Yes      None   Pulsatile Axillary              Yes      None   Pulsatile Brachial              Yes      None Cephalic              Yes      None Basilic               Yes      None  [id number] Walker Dodge DO Electronically signed by Corina Dodge DO on 9/5/2024 at 12:51:05 PM  ** Final **          Assessment and Plan    Dinesh Mccall is a 79 year old man with unknown medical history presenting for fall with in apparent acute decompensated heart failure of unclear etiology with mild respiratory  distress. BNP was 2182, and TTE revealed 15-20%, global hypokinesia of LV, elevated mean LAP, moderate MR, moderate/severe TR, moderately enlarged RV, mild/moderately elevated PAP. He also had severe bilateral LE edema with chronic skin changes and L dorsal mid foot cellulitis likely secondary to ulceration per CT read for which he completed a course of abx. For the ADHF, he was started on a lasix gtt with significant improvement to his dyspnea and BLE /edema.     Lasix drip was stopped due to hypokalemia with his morning K+ read as 2.6 a significant fall from 4.1 the evening before. IV K+ Was started due to the patients low potassium and his lasix drip was held. He was also started on TID K+ due to his recurrent hypokalemia. Mr. Mccall declined to have a RHC due to back pain and feeling generally unwell. Currently on 4 L nasal cannula. On  IV pantoprazole BID and 4L nasal cannula.     Updates 09/06/24:  -RHC ordered to assess volume status, specific request to obtain portal venous gradient. Patient declined to have RHC done today says he would prefer to do it outpatient at an outside hospital.   - lasix gtt stopped this morning due to hypokalemia.    - RFP three times a day while being diuresed   - Fluid restriction 1.5L  - Followed up with GI and will continue to defer EGD until after cardiac MRI with stress.  - DVT LUE shows no DVT      #Acute Decompensated Systolic Heart Failure (EF 15-20%), unclear etiology, Stage C NYHA class III  #RBBB  #Suspected Pulmonary HTN Type 2  -BNP today 1448 down from 2402 at peak, plan to stop trending   -Admission weight 230lb, Current weight 193lbs. Dry weight unknown  -CXR 9/3: Questionable slight interval worsening in right-sided effusion/edema.   -TTE 8/26: LVEF 15-20%, global hypokinesia of LV, elevated mean LAP, moderate MR, moderate/severe TR, moderately enlarged RV, mild/moderately elevated PAP   -Liver US 8/25 showed moderate ascites with hepatomegaly  - Cardiology was  consulted in past and recommended cardiac MRI stress. Thought etiology is related to post viral syndrome vs ischemic cardiomyopathy. Unable to get cardiac MRI due to Hgb < 9 since admission  Plan:  -Held  Lasix gtt 30 mg/hr,  due to hypokalemia   -Goal net negative 2L fluid balance   -strict I/Os   -1.5 L fluid restriction   -RFP 3x a day while being diuressed  -RHC ordered to assess volume status, specific request for portal venous gradient    - Patient Declined RHC   - Per Hepatology reached out to IR to request a portal venous gradient.   - IR consulted appreciate rec's   -GDMT:            - BB: defer for now iso ADHF            - ACE/ARB/ARNI: defer for now iso KIA            - MRA: defer for now iso KIA            - SGLT2-inhibitor: defer for now. Iso KIA             - Hydralazine 10 mg PO and Imdur 30 mg daily, Goal BP < 140      #Melena, improving  #Anemia, (Hgb 7-8 while in hospital)  -anemia etiology likely iron deficiency anemia with compounded GI bleed.  -GI consulted on admission but deferred EGD due to decompensated heart failure and until cardiac workup is complete.   - Reports taking 6-8 Advil daily for a year  - Iron 28, Transferrin 263, TIBC sat 8%, ferritin 40 > Iron deficiency  - Haptoglobin 167 and   - last melena noted on 9/2, bowel movement without melena on 9/3  - consented for blood   - total received 4 units pRBCs during this admission.   Plan:  - holding lovenox due to GI bleed  - transfuse for Hgb>8 due to GI bleed per GI recommendations   - Transfuse for Hgb > 9 for cardiac MRI with regadenoson when ischemic evaluation is to be obtained  - Continue IV pantoprazole 40mg BID  - Followed up with GI since patient is clinically improving in regards to decompensated heart failure. They will continue to defer EGD since patient is high risk until cardiac MRI is done  - Type and screen ordered today repeat every 3 days while inpatient      #KIA, improving  - etiology presumed cardiorenal  due to decompensated heart failure.   - No baseline Cr available   - Admission Cr. 2.2, peak 3.08, now at 2.69  -urine lytes 8/25: urine Na <10, urine Cr 82.3, Urine K/Cr 60   Plan:  -avoid nephrotoxic agents  -CTM creatinine  -RFP 3x a day while being diuresed  -replete electrolytes for K>4 and Mg>2    #LUE edema and ecchymosis  - no significant trauma  - new edema and ecchymosis occurred while in hospital  - mild pain with movement at the elbow  - PT 16.2, INR 1.4, PTT 73  - Doppler US of LUE no DVT  Plan:  -Ace band and cold compress  -CTM  -pain regimen: Tylenol 650 mg prn and Oxycodone 5mg prn    #HypoKalemia  - Potassium Sep-6 2.6 down from 4.1 the evening before.   - Patient was on 30 mg/hr lasix drip which has since been held   - Has routinely had bouts of hypokalemia during this admission  Plan:  - Patient was given 20 mg of K+ over 2 hours twice today   - Lasix GGT held   - Fluid restriction of 1500 ml   - Added PRN K+ tabs for K+ 3.5 or below  - Added TID K+ due to recurrent hypokalemia     #Insomnia  -was on Melatonin 6 mg nighty with continued difficulty sleeping  - Qtc prolongation noted on EKG  - trazodone, mirtazapine not suggested.   -Switched to ramelteon with improvement in sleep quality  Plan:  - continue ramelteon 8mg nightly    #LLE cellulitis, resolved  -Unilateral erythema on LLE. No pus noted.   - X ray of L foot concerning for soft tissue gas  - doppler LE negative for DVT  - CT foot negative for deep space infection or osteomyelitis  - was treated with Vanc and Zosyn and then Vanc and Ceftriaxone from 8/24-8/26  -switched to Bactrim 800mg on 8/26 for a 5 day course, completed    # Disposition: SNF/acute rehab facility  Patient likely not safe to go home for now. Will need social work and APS check on his house.    Medical Check List   FEN  -Fluids:  PRN cautiously given HFrEF 15-20%  -Electrolytes: PRN, with goals of Mg >2, K>4  -Nutrition: Cardiac diet  Prophylaxis:  -DVT ppx: Held iso  melena  -GI ppx/Bowel care: IV Pantoprazole 40mg BID   -Abx: None  -Pain regimen: Tylenol 650mg and Oxycodone 5mg prn  Hardware:  -Drains: Garcia  -Lines: PIV  Social:  -Code: Full Code  -NOK/Surrogate Decision Maker: Skyla Kincaid (Sister) [phone] or [phone]             Ivory Oliva [phone]       Patient seen and staffed with attending provider Dr. Marshall.  Tom Freeman MD  Internal Medicine PGY-1    Disclaimer: Documentation completed with the information available at the time of input. The times in the chart may not be reflective of actual patient care times, interventions, or procedures. Documentation occurs after the physical care of the patient.

## 2024-09-06 NOTE — CARE PLAN
The patient's goals for the shift include      The clinical goals for the shift include patient will have pulse ox greater than 92% throughout shift    Problem: Skin  Goal: Decreased wound size/increased tissue granulation at next dressing change  Outcome: Progressing  Flowsheets (Taken 9/6/2024 0612)  Decreased wound size/increased tissue granulation at next dressing change: Utilize specialty bed per algorithm  Goal: Participates in plan/prevention/treatment measures  Outcome: Progressing  Flowsheets (Taken 9/6/2024 0612)  Participates in plan/prevention/treatment measures:   Discuss with provider PT/OT consult   Elevate heels  Goal: Prevent/manage excess moisture  Outcome: Progressing  Flowsheets (Taken 9/6/2024 0612)  Prevent/manage excess moisture:   Cleanse incontinence/protect with barrier cream   Follow provider orders for dressing changes   Moisturize dry skin  Goal: Prevent/minimize sheer/friction injuries  Outcome: Progressing  Flowsheets (Taken 9/6/2024 0612)  Prevent/minimize sheer/friction injuries:   Use pull sheet   HOB 30 degrees or less   Turn/reposition every 2 hours/use positioning/transfer devices  Goal: Promote/optimize nutrition  Outcome: Progressing  Flowsheets (Taken 9/6/2024 0612)  Promote/optimize nutrition:   Monitor/record intake including meals   Offer water/supplements/favorite foods  Goal: Promote skin healing  Outcome: Progressing  Flowsheets (Taken 9/6/2024 0612)  Promote skin healing:   Assess skin/pad under line(s)/device(s)   Protective dressings over bony prominences   Turn/reposition every 2 hours/use positioning/transfer devices   Rotate device position/do not position patient on device     Problem: Pain - Adult  Goal: Verbalizes/displays adequate comfort level or baseline comfort level  Outcome: Progressing     Problem: Safety - Adult  Goal: Free from fall injury  Outcome: Progressing     Problem: Fall/Injury  Goal: Not fall by end of shift  Outcome: Progressing  Goal: Be free  from injury by end of the shift  Outcome: Progressing  Goal: Pace activities to prevent fatigue by end of the shift  Outcome: Progressing

## 2024-09-06 NOTE — PROGRESS NOTES
Occupational Therapy                 Therapy Communication Note    Patient Name: Dinesh Mccall  MRN: [id number]  Today's Date: 9/6/2024     Discipline: Occupational Therapy    Missed Visit Reason: Missed Visit Reason: Other (Comment) (OT tx held d/t low potassium (2.6). Will re-attempt as schedule permits and as pt medically appropriate.)    Missed Time: Attempt    MIR Esteves/L

## 2024-09-07 LAB
ALBUMIN SERPL BCP-MCNC: 2.9 G/DL (ref 3.4–5)
ALBUMIN SERPL BCP-MCNC: 3.3 G/DL (ref 3.4–5)
ALP SERPL-CCNC: 89 U/L (ref 33–136)
ALT SERPL W P-5'-P-CCNC: 13 U/L (ref 10–52)
ANION GAP SERPL CALC-SCNC: 13 MMOL/L (ref 10–20)
ANION GAP SERPL CALC-SCNC: 14 MMOL/L (ref 10–20)
AST SERPL W P-5'-P-CCNC: 20 U/L (ref 9–39)
BASOPHILS # BLD AUTO: 0.04 X10*3/UL (ref 0–0.1)
BASOPHILS NFR BLD AUTO: 0.3 %
BILIRUB DIRECT SERPL-MCNC: 0.6 MG/DL (ref 0–0.3)
BILIRUB SERPL-MCNC: 2.2 MG/DL (ref 0–1.2)
BUN SERPL-MCNC: 45 MG/DL (ref 6–23)
BUN SERPL-MCNC: 46 MG/DL (ref 6–23)
CALCIUM SERPL-MCNC: 7.8 MG/DL (ref 8.6–10.6)
CALCIUM SERPL-MCNC: 8.2 MG/DL (ref 8.6–10.6)
CHLORIDE SERPL-SCNC: 84 MMOL/L (ref 98–107)
CHLORIDE SERPL-SCNC: 84 MMOL/L (ref 98–107)
CO2 SERPL-SCNC: 37 MMOL/L (ref 21–32)
CO2 SERPL-SCNC: 38 MMOL/L (ref 21–32)
CREAT SERPL-MCNC: 2.36 MG/DL (ref 0.5–1.3)
CREAT SERPL-MCNC: 2.44 MG/DL (ref 0.5–1.3)
EGFRCR SERPLBLD CKD-EPI 2021: 26 ML/MIN/1.73M*2
EGFRCR SERPLBLD CKD-EPI 2021: 27 ML/MIN/1.73M*2
EOSINOPHIL # BLD AUTO: 0.1 X10*3/UL (ref 0–0.4)
EOSINOPHIL NFR BLD AUTO: 0.9 %
ERYTHROCYTE [DISTWIDTH] IN BLOOD BY AUTOMATED COUNT: 15.8 % (ref 11.5–14.5)
ERYTHROCYTE [DISTWIDTH] IN BLOOD BY AUTOMATED COUNT: 16.7 % (ref 11.5–14.5)
GLUCOSE SERPL-MCNC: 122 MG/DL (ref 74–99)
GLUCOSE SERPL-MCNC: 96 MG/DL (ref 74–99)
HCT VFR BLD AUTO: 26.2 % (ref 41–52)
HCT VFR BLD AUTO: 26.3 % (ref 41–52)
HGB BLD-MCNC: 7.8 G/DL (ref 13.5–17.5)
HGB BLD-MCNC: 8.3 G/DL (ref 13.5–17.5)
IMM GRANULOCYTES # BLD AUTO: 0.06 X10*3/UL (ref 0–0.5)
IMM GRANULOCYTES NFR BLD AUTO: 0.5 % (ref 0–0.9)
LYMPHOCYTES # BLD AUTO: 1.15 X10*3/UL (ref 0.8–3)
LYMPHOCYTES NFR BLD AUTO: 9.9 %
MAGNESIUM SERPL-MCNC: 1.77 MG/DL (ref 1.6–2.4)
MCH RBC QN AUTO: 28.5 PG (ref 26–34)
MCH RBC QN AUTO: 28.7 PG (ref 26–34)
MCHC RBC AUTO-ENTMCNC: 29.8 G/DL (ref 32–36)
MCHC RBC AUTO-ENTMCNC: 31.6 G/DL (ref 32–36)
MCV RBC AUTO: 91 FL (ref 80–100)
MCV RBC AUTO: 96 FL (ref 80–100)
MONOCYTES # BLD AUTO: 0.91 X10*3/UL (ref 0.05–0.8)
MONOCYTES NFR BLD AUTO: 7.8 %
NEUTROPHILS # BLD AUTO: 9.35 X10*3/UL (ref 1.6–5.5)
NEUTROPHILS NFR BLD AUTO: 80.6 %
NRBC BLD-RTO: 0 /100 WBCS (ref 0–0)
NRBC BLD-RTO: 0 /100 WBCS (ref 0–0)
PHOSPHATE SERPL-MCNC: 2.9 MG/DL (ref 2.5–4.9)
PHOSPHATE SERPL-MCNC: 3.1 MG/DL (ref 2.5–4.9)
PHYTONADIONE SERPL-MCNC: 0.29 NMOL/L (ref 0.22–4.88)
PLATELET # BLD AUTO: 233 X10*3/UL (ref 150–450)
PLATELET # BLD AUTO: 241 X10*3/UL (ref 150–450)
POTASSIUM SERPL-SCNC: 3.3 MMOL/L (ref 3.5–5.3)
POTASSIUM SERPL-SCNC: 3.6 MMOL/L (ref 3.5–5.3)
POTASSIUM SERPL-SCNC: 3.8 MMOL/L (ref 3.5–5.3)
PROT SERPL-MCNC: 6.6 G/DL (ref 6.4–8.2)
RBC # BLD AUTO: 2.74 X10*6/UL (ref 4.5–5.9)
RBC # BLD AUTO: 2.89 X10*6/UL (ref 4.5–5.9)
SODIUM SERPL-SCNC: 131 MMOL/L (ref 136–145)
SODIUM SERPL-SCNC: 132 MMOL/L (ref 136–145)
WBC # BLD AUTO: 11.6 X10*3/UL (ref 4.4–11.3)
WBC # BLD AUTO: 11.9 X10*3/UL (ref 4.4–11.3)

## 2024-09-07 PROCEDURE — 2500000001 HC RX 250 WO HCPCS SELF ADMINISTERED DRUGS (ALT 637 FOR MEDICARE OP)

## 2024-09-07 PROCEDURE — 84100 ASSAY OF PHOSPHORUS: CPT

## 2024-09-07 PROCEDURE — 2500000002 HC RX 250 W HCPCS SELF ADMINISTERED DRUGS (ALT 637 FOR MEDICARE OP, ALT 636 FOR OP/ED)

## 2024-09-07 PROCEDURE — 85025 COMPLETE CBC W/AUTO DIFF WBC: CPT

## 2024-09-07 PROCEDURE — 2060000001 HC INTERMEDIATE ICU ROOM DAILY

## 2024-09-07 PROCEDURE — 85027 COMPLETE CBC AUTOMATED: CPT

## 2024-09-07 PROCEDURE — 83735 ASSAY OF MAGNESIUM: CPT

## 2024-09-07 PROCEDURE — 2500000004 HC RX 250 GENERAL PHARMACY W/ HCPCS (ALT 636 FOR OP/ED)

## 2024-09-07 PROCEDURE — P9016 RBC LEUKOCYTES REDUCED: HCPCS

## 2024-09-07 PROCEDURE — 2500000001 HC RX 250 WO HCPCS SELF ADMINISTERED DRUGS (ALT 637 FOR MEDICARE OP): Performed by: STUDENT IN AN ORGANIZED HEALTH CARE EDUCATION/TRAINING PROGRAM

## 2024-09-07 PROCEDURE — 36415 COLL VENOUS BLD VENIPUNCTURE: CPT

## 2024-09-07 PROCEDURE — 36430 TRANSFUSION BLD/BLD COMPNT: CPT

## 2024-09-07 PROCEDURE — 84132 ASSAY OF SERUM POTASSIUM: CPT

## 2024-09-07 PROCEDURE — 80053 COMPREHEN METABOLIC PANEL: CPT

## 2024-09-07 PROCEDURE — 99233 SBSQ HOSP IP/OBS HIGH 50: CPT

## 2024-09-07 PROCEDURE — 80076 HEPATIC FUNCTION PANEL: CPT

## 2024-09-07 RX ORDER — POTASSIUM CHLORIDE 14.9 MG/ML
20 INJECTION INTRAVENOUS
Status: COMPLETED | OUTPATIENT
Start: 2024-09-07 | End: 2024-09-07

## 2024-09-07 RX ORDER — LIDOCAINE HYDROCHLORIDE 10 MG/ML
2 INJECTION, SOLUTION EPIDURAL; INFILTRATION; INTRACAUDAL; PERINEURAL ONCE
Status: DISCONTINUED | OUTPATIENT
Start: 2024-09-07 | End: 2024-09-09 | Stop reason: HOSPADM

## 2024-09-07 RX ORDER — ACETAZOLAMIDE 250 MG/1
250 TABLET ORAL DAILY
Status: DISCONTINUED | OUTPATIENT
Start: 2024-09-07 | End: 2024-09-08

## 2024-09-07 RX ORDER — FUROSEMIDE 10 MG/ML
160 INJECTION INTRAMUSCULAR; INTRAVENOUS ONCE
Status: COMPLETED | OUTPATIENT
Start: 2024-09-07 | End: 2024-09-07

## 2024-09-07 RX ORDER — POTASSIUM CHLORIDE 20 MEQ/1
40 TABLET, EXTENDED RELEASE ORAL ONCE
Status: DISCONTINUED | OUTPATIENT
Start: 2024-09-07 | End: 2024-09-07

## 2024-09-07 RX ORDER — HYDRALAZINE HYDROCHLORIDE 25 MG/1
25 TABLET, FILM COATED ORAL 3 TIMES DAILY
Status: DISCONTINUED | OUTPATIENT
Start: 2024-09-07 | End: 2024-09-09 | Stop reason: HOSPADM

## 2024-09-07 RX ORDER — POTASSIUM CHLORIDE 14.9 MG/ML
20 INJECTION INTRAVENOUS
Status: DISCONTINUED | OUTPATIENT
Start: 2024-09-07 | End: 2024-09-07

## 2024-09-07 RX ORDER — POTASSIUM CHLORIDE 20 MEQ/1
40 TABLET, EXTENDED RELEASE ORAL 2 TIMES DAILY
Status: DISCONTINUED | OUTPATIENT
Start: 2024-09-07 | End: 2024-09-09 | Stop reason: HOSPADM

## 2024-09-07 RX ORDER — MAGNESIUM SULFATE HEPTAHYDRATE 40 MG/ML
2 INJECTION, SOLUTION INTRAVENOUS ONCE
Status: COMPLETED | OUTPATIENT
Start: 2024-09-07 | End: 2024-09-07

## 2024-09-07 ASSESSMENT — COGNITIVE AND FUNCTIONAL STATUS - GENERAL
MOBILITY SCORE: 12
CLIMB 3 TO 5 STEPS WITH RAILING: A LOT
DAILY ACTIVITIY SCORE: 14
DAILY ACTIVITIY SCORE: 14
DRESSING REGULAR UPPER BODY CLOTHING: A LOT
DRESSING REGULAR UPPER BODY CLOTHING: A LOT
PERSONAL GROOMING: A LOT
TURNING FROM BACK TO SIDE WHILE IN FLAT BAD: A LOT
WALKING IN HOSPITAL ROOM: A LOT
WALKING IN HOSPITAL ROOM: A LOT
STANDING UP FROM CHAIR USING ARMS: A LOT
MOVING FROM LYING ON BACK TO SITTING ON SIDE OF FLAT BED WITH BEDRAILS: A LOT
DRESSING REGULAR LOWER BODY CLOTHING: A LOT
HELP NEEDED FOR BATHING: A LOT
MOVING TO AND FROM BED TO CHAIR: A LOT
TOILETING: A LOT
CLIMB 3 TO 5 STEPS WITH RAILING: A LOT
TURNING FROM BACK TO SIDE WHILE IN FLAT BAD: A LOT
MOBILITY SCORE: 12
TOILETING: A LOT
DRESSING REGULAR LOWER BODY CLOTHING: A LOT
MOVING FROM LYING ON BACK TO SITTING ON SIDE OF FLAT BED WITH BEDRAILS: A LOT
STANDING UP FROM CHAIR USING ARMS: A LOT
MOVING TO AND FROM BED TO CHAIR: A LOT
PERSONAL GROOMING: A LOT
HELP NEEDED FOR BATHING: A LOT

## 2024-09-07 ASSESSMENT — PAIN SCALES - GENERAL
PAINLEVEL_OUTOF10: 0 - NO PAIN
PAINLEVEL_OUTOF10: 0 - NO PAIN

## 2024-09-07 ASSESSMENT — PAIN - FUNCTIONAL ASSESSMENT: PAIN_FUNCTIONAL_ASSESSMENT: 0-10

## 2024-09-07 NOTE — PROGRESS NOTES
Internal Medicine Progress Note      Subjective   NAEO. Patient doing well this morning. States he feels significantly better than when he was first admitted to the hospital. Agrees to getting a RHC on Monday. Still notes swelling in his left arm.     Objective     Vitals: I/O:   Vitals:    09/07/24 0800   BP:    Pulse:    Resp:    Temp: 36.5 °C (97.7 °F)   SpO2:       24hr Min/Max:  Temp  Min: 36.3 °C (97.3 °F)  Max: 37.5 °C (99.5 °F)  Pulse  Min: 73  Max: 77  BP  Min: 105/55  Max: 129/74  Resp  Min: 20  Max: 22  SpO2  Min: 94 %  Max: 99 %   Intake/Output Summary (Last 24 hours) at 9/7/2024 1045  Last data filed at 9/7/2024 0800  Gross per 24 hour   Intake 380 ml   Output 600 ml   Net -220 ml         Physical Exam:  General: Awake, alert, orientated. not in any acute distress  HEENT: no scleral icterus or conjunctivitis  Chest: CTAB, normal respiratory effort, no wheezes or crackles, on 2L NC supplemental oxygen. JVD noted to mid neck  Cardiac: Soft heart sounds, RRR, no Murmurs or rubs.   Abdomen: Soft, Distended, fluid wave present, NT  EXT: No peripheral edema, no asymmetry noted. Chronic skin changes bilaterally. Peeling of skin on left leg. left arm with notable edema and significantly larger than right arm.   MSK: No focal joint swelling noted  Skin: Ecchymosis of left arm, most prominent around elbow and forearm.   Neuro: AOx4, moving all limbs spontaneously, follows commands  Psych: Coherent thought process, appropriate mood and affect    General Chemistry Labs    Results from last 72 hours   Lab Units 09/07/24  0531 09/06/24  2210 09/06/24  1931 09/06/24  1418 09/06/24  0723 09/05/24  1709 09/05/24  1324   GLUCOSE mg/dL 96 122* 137*   < > 95   < > 135*   SODIUM mmol/L 131* 132* 132*   < > 134*   < > 132*   POTASSIUM mmol/L 3.6 3.3* 3.0*   < > 2.6*   < > 4.7   CHLORIDE mmol/L 84* 84* 82*   < > 84*   < > 87*   CO2 mmol/L 37* 38* 40*   < > 39*   < > 35*   BUN mg/dL 46* 45* 44*   < > 45*   < > 44*   CREATININE  "mg/dL 2.36* 2.44* 2.47*   < > 2.62*   < > 2.58*   ANION GAP mmol/L 14 13 13   < > 14   < > 15   EGFR mL/min/1.73m*2 27* 26* 26*   < > 24*   < > 25*   CALCIUM mg/dL 8.2* 7.8* 8.4*   < > 8.2*   < > 7.8*   PHOSPHORUS mg/dL 2.9 3.1 3.2  --  3.8   < > 4.5   MAGNESIUM mg/dL 1.77  --   --   --  1.80  --  1.95   ALBUMIN g/dL 3.3* 2.9* 3.2*  --  3.0*   < > 2.7*   ALK PHOS U/L 89  --   --   --   --   --   --    ALT U/L 13  --   --   --   --   --   --    AST U/L 20  --   --   --   --   --   --    BILIRUBIN TOTAL mg/dL 2.2*  --   --   --   --   --   --    BILIRUBIN DIRECT mg/dL 0.6*  --   --   --   --   --   --    PROTEIN TOTAL g/dL 6.6  --   --   --   --   --   --     < > = values in this interval not displayed.      CBC  Results from last 72 hours   Lab Units 09/07/24  0531 09/06/24  0723 09/05/24  0705   WBC AUTO x10*3/uL 11.6* 11.7* 11.9*   HEMOGLOBIN g/dL 7.8* 8.0* 7.8*   HEMATOCRIT % 26.2* 26.6* 25.5*   MCV fL 96 96 95   MCH pg 28.5 29.0 29.0   MCHC g/dL 29.8* 30.1* 30.6*   RDW % 15.8* 15.5* 15.4*   PLATELETS AUTO x10*3/uL 241 250 251   NEUTROS PCT AUTO % 80.6 83.5 82.4   LYMPHS PCT AUTO % 9.9 7.1 7.3   MONOS PCT AUTO % 7.8 7.3 7.7   EOS PCT AUTO % 0.9 1.0 1.4     Coagulation Labs        No lab exists for component: \"RETICHBG\"    Cardiac Labs  Results from last 72 hours   Lab Units 09/06/24  1108 09/05/24  1709 09/05/24  0705 09/04/24  1626   [facility] ng/L 112*  --   --   --    BNP pg/mL  --  1,448* 1,464* 1,975*     Micro/ID:   No results found for: \"URINECULTURE\", \"BLOODCULT\", \"CSFCULTSMEAR\"          Scheduled Medications:  PRN Medications:  Continuous Medications:   acetaZOLAMIDE, 250 mg, oral, Daily  [Held by provider] enoxaparin, 30 mg, subcutaneous, q24h  folic acid, 1 mg, oral, Daily  furosemide, 160 mg, intravenous, Once  hydrALAZINE, 25 mg, oral, TID  isosorbide mononitrate ER, 30 mg, oral, Daily  multivitamin with minerals, 1 tablet, oral, Daily  pantoprazole, 40 mg, intravenous, q12h [facility]  perflutren protein A " microsphere, 0.5 mL, intravenous, Once in imaging  potassium chloride CR, 40 mEq, oral, BID  ramelteon, 8 mg, oral, Nightly  sulfur hexafluoride microsphr, 2 mL, intravenous, Once in imaging  thiamine, 100 mg, oral, Daily     PRN medications: acetaminophen, oxyCODONE, polyethylene glycol, potassium chloride CR, sodium chloride, white petrolatum         Summary of key imaging results from the last 24 hours:    No results found.      Assessment and Plan    Dinesh Mccall is a 79 year old man with unknown medical history presenting for fall with in apparent acute decompensated heart failure of unclear etiology with mild respiratory distress. BNP was 2182, and TTE revealed 15-20%, global hypokinesia of LV, elevated mean LAP, moderate MR, moderate/severe TR, moderately enlarged RV, mild/moderately elevated PAP. He also had severe bilateral LE edema with chronic skin changes and L dorsal mid foot cellulitis likely secondary to ulceration per CT read for which he completed a course of abx. For the ADHF, he was started on a lasix gtt with significant improvement to his dyspnea and BLE /edema. Lasix drip was stopped due to significant hypokalemia and he was also started scheduled K supplementation.       Updates 09/07/24:  -RHC ordered to assess volume status, specific request to obtain portal venous gradient. Patient declined to have RHC done on 9/6, Rescheduled for 9/9  -K 40 mg PO BID scheduled  -IV lasix 160 mg   - Acetazolamide 250 mg daily for contraction alkalosis   - RFP three times a day while being diuresed  - Fluid restriction 1.5L  -Increased Hydralazine 10 TID to 25 mg TID  - Discussed request for portal venous gradient with IR, They will obtain on Monday 9/9  - GI: defer EGD until after cardiac MRI with stress.  -1 unit pRBCs ordered for goal Hgb>8 per GI team  -will consider paracentesis for notable ascites on exam.      #Acute Decompensated Systolic Heart Failure (EF 15-20%), unclear etiology, Stage C NYHA  class III  #RBBB  #Suspected Pulmonary HTN Type 2  -BNP today 1448 down from 2402 at peak, plan to stop trending   -Admission weight 230lb, Current weight 193lbs. Dry weight unknown  -CXR 9/3: Questionable slight interval worsening in right-sided effusion/edema.   -TTE 8/26: LVEF 15-20%, global hypokinesia of LV, elevated mean LAP, moderate MR, moderate/severe TR, moderately enlarged RV, mild/moderately elevated PAP   -Liver US 8/25 showed moderate ascites with hepatomegaly  - Cardiology was consulted in past and recommended cardiac MRI stress. Thought etiology is related to post viral syndrome vs ischemic cardiomyopathy. Unable to get cardiac MRI due to Hgb < 9 since admission  Plan:  -IV lasix 160 mg and Acetazolamide 250 mg daily.   -Goal net negative 2L fluid balance   -strict I/Os   -1.5 L fluid restriction   -RFP 3x a day while being diuressed  -RHC ordered to assess volume status, specific request for portal venous gradient    - Patient initially declined RHC but was agreeable to getting it on Monday 9/9 after discussion with family  - contacted IR to for portal venous gradient. They will obtain on Monday 9/9.  -GDMT:            - BB: defer for now iso ADHF            - ACE/ARB/ARNI: defer for now iso KIA            - MRA: defer for now iso KIA            - SGLT2-inhibitor: defer for now. Iso KIA             - Hydralazine 25 mg TID and Imdur 30 mg daily, Goal BP < 140      #Melena, improving  #Anemia, (Hgb 7-8 while in hospital)  -anemia etiology likely iron deficiency anemia with compounded GI bleed.  -GI consulted on admission but deferred EGD due to decompensated heart failure and until cardiac workup is complete.   - Reports taking 6-8 Advil daily for a year  - Iron 28, Transferrin 263, TIBC sat 8%, ferritin 40 > Iron deficiency  - Haptoglobin 167 and   - last melena noted on 9/2, bowel movement without melena on 9/3  - consented for blood   - total received 5 units pRBCs during this admission.    Plan:  - 1 unit pRBCs ordered  - holding lovenox due to GI bleed  - transfuse for Hgb>8 due to GI bleed per GI recommendations   - Transfuse for Hgb > 9 for cardiac MRI with regadenoson when ischemic evaluation is to be obtained  - Continue IV pantoprazole 40mg BID  - Followed up with GI since patient is clinically improving in regards to decompensated heart failure. They will continue to defer EGD since patient is high risk until cardiac MRI is done  - Type and screen repeat every 3 days while inpatient      #KIA, improving  - etiology presumed cardiorenal due to decompensated heart failure.   - No baseline Cr available   - Admission Cr. 2.2, peak 3.08, now at 2.69  -urine lytes 8/25: urine Na <10, urine Cr 82.3, Urine K/Cr 60   Plan:  -avoid nephrotoxic agents  -CTM creatinine  -RFP 3x a day while being diuresed  -replete electrolytes for K>4 and Mg>2    #LUE edema and ecchymosis  - no significant trauma  - new edema and ecchymosis occurred while in hospital  - mild pain with movement at the elbow  - PT 16.2, INR 1.4, PTT 73  - Doppler US of LUE no DVT  Plan:  -Ace band and cold compress  -CTM  -pain regimen: Tylenol 650 mg prn and Oxycodone 5mg prn    #HypoKalemia  - Potassium Sep-6 2.6 down from 4.1 the evening before.   - Patient was on 30 mg/hr lasix drip which has since been held   - Has routinely had bouts of hypokalemia during this admission  -lasix gtt stopped  Plan:  - Patient was IV potassium supplementation this morning  - Scheduled K 40 mg BID  - Fluid restriction of 1500 ml   - PRN K+ tabs for K+ 3.5 or below    #Insomnia  -was on Melatonin 6 mg nighty with continued difficulty sleeping  - Qtc prolongation noted on EKG  - trazodone, mirtazapine not suggested.   -Switched to ramelteon with improvement in sleep quality  Plan:  - continue ramelteon 8mg nightly    #LLE cellulitis, resolved  -Unilateral erythema on LLE. No pus noted.   - X ray of L foot concerning for soft tissue gas  - doppler LE  negative for DVT  - CT foot negative for deep space infection or osteomyelitis  - was treated with Vanc and Zosyn and then Vanc and Ceftriaxone from 8/24-8/26  -switched to Bactrim 800mg on 8/26 for a 5 day course, completed    # Disposition: SNF/acute rehab facility  Patient likely not safe to go home for now. Will need social work and APS check on his house.    Medical Check List   FEN  -Fluids:  PRN cautiously given HFrEF 15-20%  -Electrolytes: PRN, with goals of Mg >2, K>4  -Nutrition: Cardiac diet  Prophylaxis:  -DVT ppx: Held iso melena  -GI ppx/Bowel care: IV Pantoprazole 40mg BID   -Abx: None  -Pain regimen: Tylenol 650mg and Oxycodone 5mg prn  Hardware:  -Drains: Garcia  -Lines: PIV  Social:  -Code: Full Code  -NOK/Surrogate Decision Maker: Skyla Kincaid (Sister) [phone] or [phone]             Ivory Oliva [phone]       Patient seen and staffed with attending provider Dr. Marshall.  Tom Freeman MD  Internal Medicine PGY-1    Disclaimer: Documentation completed with the information available at the time of input. The times in the chart may not be reflective of actual patient care times, interventions, or procedures. Documentation occurs after the physical care of the patient.

## 2024-09-07 NOTE — ED NOTES
"Pharmacy Medication History Review    Dinesh Mccall is a 79 y.o. male admitted for Acute HFrEF (heart failure with reduced ejection fraction) (Multi). Pharmacy reviewed the patient's dgtde-yq-vqapyrmta medications and allergies for accuracy.      The list below reflects the updated PTA list.   Prior to Admission Medications   Prescriptions Last Dose Informant   UNABLE TO FIND  Self   Sig: Take 1 tablet by mouth once daily. Med Name: probiotic   ibuprofen/acetaminophen (ADVIL DUAL ACTION ORAL)  Self   Sig: Take 2 tablets by mouth every 8 hours if needed (pain).      Facility-Administered Medications: None        The list below reflects the updated allergy list. Please review each documented allergy for additional clarification and justification.  Allergies  Reviewed by Daniel Woodson RN on 9/6/2024        Severity Reactions Comments    Lactose Not Specified GI Upset             Patient declines M2B at discharge.     Sources:   Sources:    -patient interview  -outpatient pharmacy dispense history  -Care Everywhere medication lists  Below are additional concerns with the patient's PTA list:     Additional Comments:  Pt states that [facility] requires he fills at a [facility]. No pharmacy was listed as he states that he will be discharging to an unknown rehab facility at [facility] JENNIFER MONTAÑO  PGY-1 Pharmacy Resident  09/07/24     Secure Chat preferred   If no response call [phone] or [email or web address] \"Med Rec\"    "

## 2024-09-07 NOTE — CARE PLAN
The patient's goals for the shift include      The clinical goals for the shift include Patient pulse ox will be greater than 92% this shift    Over the shift, the patient did not make progress toward the following goals. Barriers to progression include patient with a low potassium. Recommendations to address these barriers include recheck potassium 45 minutes post infusion.

## 2024-09-08 VITALS
WEIGHT: 192.46 LBS | OXYGEN SATURATION: 96 % | HEIGHT: 71 IN | HEART RATE: 110 BPM | SYSTOLIC BLOOD PRESSURE: 118 MMHG | TEMPERATURE: 97.2 F | BODY MASS INDEX: 26.94 KG/M2 | RESPIRATION RATE: 16 BRPM | DIASTOLIC BLOOD PRESSURE: 71 MMHG

## 2024-09-08 LAB
ALBUMIN SERPL BCP-MCNC: 2.9 G/DL (ref 3.4–5)
ALBUMIN SERPL BCP-MCNC: 3.1 G/DL (ref 3.4–5)
ALBUMIN SERPL BCP-MCNC: 3.2 G/DL (ref 3.4–5)
ANION GAP SERPL CALC-SCNC: 12 MMOL/L (ref 10–20)
ANION GAP SERPL CALC-SCNC: 14 MMOL/L (ref 10–20)
ANION GAP SERPL CALC-SCNC: 15 MMOL/L (ref 10–20)
APTT PPP: 52 SECONDS (ref 27–38)
BASOPHILS # BLD AUTO: 0.04 X10*3/UL (ref 0–0.1)
BASOPHILS NFR BLD AUTO: 0.4 %
BLOOD EXPIRATION DATE: NORMAL
BUN SERPL-MCNC: 47 MG/DL (ref 6–23)
BUN SERPL-MCNC: 47 MG/DL (ref 6–23)
BUN SERPL-MCNC: 51 MG/DL (ref 6–23)
CALCIUM SERPL-MCNC: 7.9 MG/DL (ref 8.6–10.6)
CALCIUM SERPL-MCNC: 8.3 MG/DL (ref 8.6–10.6)
CALCIUM SERPL-MCNC: 8.4 MG/DL (ref 8.6–10.6)
CHLORIDE SERPL-SCNC: 84 MMOL/L (ref 98–107)
CHLORIDE SERPL-SCNC: 85 MMOL/L (ref 98–107)
CHLORIDE SERPL-SCNC: 85 MMOL/L (ref 98–107)
CO2 SERPL-SCNC: 35 MMOL/L (ref 21–32)
CO2 SERPL-SCNC: 37 MMOL/L (ref 21–32)
CO2 SERPL-SCNC: 38 MMOL/L (ref 21–32)
CREAT SERPL-MCNC: 2.56 MG/DL (ref 0.5–1.3)
CREAT SERPL-MCNC: 2.65 MG/DL (ref 0.5–1.3)
CREAT SERPL-MCNC: 2.83 MG/DL (ref 0.5–1.3)
DISPENSE STATUS: NORMAL
EGFRCR SERPLBLD CKD-EPI 2021: 22 ML/MIN/1.73M*2
EGFRCR SERPLBLD CKD-EPI 2021: 24 ML/MIN/1.73M*2
EGFRCR SERPLBLD CKD-EPI 2021: 25 ML/MIN/1.73M*2
EOSINOPHIL # BLD AUTO: 0.09 X10*3/UL (ref 0–0.4)
EOSINOPHIL NFR BLD AUTO: 0.8 %
ERYTHROCYTE [DISTWIDTH] IN BLOOD BY AUTOMATED COUNT: 17 % (ref 11.5–14.5)
GLUCOSE BLD MANUAL STRIP-MCNC: 160 MG/DL (ref 74–99)
GLUCOSE SERPL-MCNC: 115 MG/DL (ref 74–99)
GLUCOSE SERPL-MCNC: 116 MG/DL (ref 74–99)
GLUCOSE SERPL-MCNC: 182 MG/DL (ref 74–99)
HCT VFR BLD AUTO: 26.3 % (ref 41–52)
HGB BLD-MCNC: 8.2 G/DL (ref 13.5–17.5)
IMM GRANULOCYTES # BLD AUTO: 0.11 X10*3/UL (ref 0–0.5)
IMM GRANULOCYTES NFR BLD AUTO: 1 % (ref 0–0.9)
INR PPP: 1.3 (ref 0.9–1.1)
LYMPHOCYTES # BLD AUTO: 1 X10*3/UL (ref 0.8–3)
LYMPHOCYTES NFR BLD AUTO: 9.4 %
MAGNESIUM SERPL-MCNC: 1.98 MG/DL (ref 1.6–2.4)
MCH RBC QN AUTO: 28.9 PG (ref 26–34)
MCHC RBC AUTO-ENTMCNC: 31.2 G/DL (ref 32–36)
MCV RBC AUTO: 93 FL (ref 80–100)
MONOCYTES # BLD AUTO: 0.77 X10*3/UL (ref 0.05–0.8)
MONOCYTES NFR BLD AUTO: 7.2 %
NEUTROPHILS # BLD AUTO: 8.63 X10*3/UL (ref 1.6–5.5)
NEUTROPHILS NFR BLD AUTO: 81.2 %
NRBC BLD-RTO: 0 /100 WBCS (ref 0–0)
PHOSPHATE SERPL-MCNC: 3 MG/DL (ref 2.5–4.9)
PHOSPHATE SERPL-MCNC: 3.3 MG/DL (ref 2.5–4.9)
PHOSPHATE SERPL-MCNC: 3.9 MG/DL (ref 2.5–4.9)
PLATELET # BLD AUTO: 221 X10*3/UL (ref 150–450)
POTASSIUM SERPL-SCNC: 3.5 MMOL/L (ref 3.5–5.3)
POTASSIUM SERPL-SCNC: 3.6 MMOL/L (ref 3.5–5.3)
POTASSIUM SERPL-SCNC: 3.9 MMOL/L (ref 3.5–5.3)
PRODUCT BLOOD TYPE: 5100
PRODUCT CODE: NORMAL
PROTHROMBIN TIME: 14.4 SECONDS (ref 9.8–12.8)
RBC # BLD AUTO: 2.84 X10*6/UL (ref 4.5–5.9)
SODIUM SERPL-SCNC: 130 MMOL/L (ref 136–145)
SODIUM SERPL-SCNC: 131 MMOL/L (ref 136–145)
SODIUM SERPL-SCNC: 132 MMOL/L (ref 136–145)
UNIT ABO: NORMAL
UNIT NUMBER: NORMAL
UNIT RH: NORMAL
UNIT VOLUME: 292
WBC # BLD AUTO: 10.6 X10*3/UL (ref 4.4–11.3)
XM INTEP: NORMAL

## 2024-09-08 PROCEDURE — 99233 SBSQ HOSP IP/OBS HIGH 50: CPT

## 2024-09-08 PROCEDURE — 2500000002 HC RX 250 W HCPCS SELF ADMINISTERED DRUGS (ALT 637 FOR MEDICARE OP, ALT 636 FOR OP/ED)

## 2024-09-08 PROCEDURE — 2500000001 HC RX 250 WO HCPCS SELF ADMINISTERED DRUGS (ALT 637 FOR MEDICARE OP)

## 2024-09-08 PROCEDURE — 2500000004 HC RX 250 GENERAL PHARMACY W/ HCPCS (ALT 636 FOR OP/ED)

## 2024-09-08 PROCEDURE — 2060000001 HC INTERMEDIATE ICU ROOM DAILY

## 2024-09-08 PROCEDURE — 82947 ASSAY GLUCOSE BLOOD QUANT: CPT

## 2024-09-08 PROCEDURE — 85610 PROTHROMBIN TIME: CPT

## 2024-09-08 PROCEDURE — 36415 COLL VENOUS BLD VENIPUNCTURE: CPT

## 2024-09-08 PROCEDURE — 83735 ASSAY OF MAGNESIUM: CPT

## 2024-09-08 PROCEDURE — 80069 RENAL FUNCTION PANEL: CPT

## 2024-09-08 PROCEDURE — 85025 COMPLETE CBC W/AUTO DIFF WBC: CPT

## 2024-09-08 RX ORDER — FUROSEMIDE 10 MG/ML
160 INJECTION INTRAMUSCULAR; INTRAVENOUS ONCE
Status: DISCONTINUED | OUTPATIENT
Start: 2024-09-08 | End: 2024-09-08 | Stop reason: SDUPTHER

## 2024-09-08 RX ORDER — ACETAZOLAMIDE 250 MG/1
250 TABLET ORAL ONCE
Status: COMPLETED | OUTPATIENT
Start: 2024-09-08 | End: 2024-09-08

## 2024-09-08 RX ORDER — FUROSEMIDE 10 MG/ML
160 INJECTION INTRAMUSCULAR; INTRAVENOUS ONCE
Status: DISCONTINUED | OUTPATIENT
Start: 2024-09-08 | End: 2024-09-09 | Stop reason: HOSPADM

## 2024-09-08 RX ORDER — POTASSIUM CHLORIDE 14.9 MG/ML
20 INJECTION INTRAVENOUS
Status: COMPLETED | OUTPATIENT
Start: 2024-09-08 | End: 2024-09-08

## 2024-09-08 RX ORDER — FUROSEMIDE 10 MG/ML
160 INJECTION INTRAMUSCULAR; INTRAVENOUS ONCE
Status: COMPLETED | OUTPATIENT
Start: 2024-09-08 | End: 2024-09-08

## 2024-09-08 ASSESSMENT — COGNITIVE AND FUNCTIONAL STATUS - GENERAL
DRESSING REGULAR LOWER BODY CLOTHING: A LOT
MOVING TO AND FROM BED TO CHAIR: A LITTLE
STANDING UP FROM CHAIR USING ARMS: A LOT
MOBILITY SCORE: 14
TOILETING: A LOT
HELP NEEDED FOR BATHING: A LOT
MOVING FROM LYING ON BACK TO SITTING ON SIDE OF FLAT BED WITH BEDRAILS: A LITTLE
DAILY ACTIVITIY SCORE: 14
DRESSING REGULAR UPPER BODY CLOTHING: A LOT
PERSONAL GROOMING: A LOT
TURNING FROM BACK TO SIDE WHILE IN FLAT BAD: A LITTLE
WALKING IN HOSPITAL ROOM: A LOT
CLIMB 3 TO 5 STEPS WITH RAILING: TOTAL

## 2024-09-08 ASSESSMENT — PAIN SCALES - GENERAL
PAINLEVEL_OUTOF10: 3
PAINLEVEL_OUTOF10: 6
PAINLEVEL_OUTOF10: 7
PAINLEVEL_OUTOF10: 6
PAINLEVEL_OUTOF10: 0 - NO PAIN
PAINLEVEL_OUTOF10: 0 - NO PAIN
PAINLEVEL_OUTOF10: 4

## 2024-09-08 ASSESSMENT — PAIN - FUNCTIONAL ASSESSMENT
PAIN_FUNCTIONAL_ASSESSMENT: 0-10

## 2024-09-08 ASSESSMENT — PAIN DESCRIPTION - LOCATION: LOCATION: BACK

## 2024-09-08 ASSESSMENT — PAIN DESCRIPTION - DESCRIPTORS
DESCRIPTORS: SORE
DESCRIPTORS: DISCOMFORT

## 2024-09-08 ASSESSMENT — PAIN SCALES - PAIN ASSESSMENT IN ADVANCED DEMENTIA (PAINAD): TOTALSCORE: MEDICATION (SEE MAR)

## 2024-09-08 NOTE — SIGNIFICANT EVENT
Rapid Response RN Note     09/08/24 0045   Onset Documentation   Rapid Response Initiated By Radar auto page   Location/Room [facility]  (LT [id number])   Pager Time 0037   Arrival Time 0045   Event End Time 0054   Primary Reason for Call Radar auto page  (score 6)     Rapid response RN at bedside for RADAR score 6 due to the following VS: T 37.4 °C; HR 85; RR 34; /74; SPO2 91% on supplemental oxygen.     Reviewed above VS with bedside RN.  Patient awake and oriented to person, place, time and situation.  Denied shortness of breath at rest.  Stated only feels short of breath with exertion.  Patient also stated that staff weaned oxygen down from 4 L/m to 2 L/m.  Patient without any complaints except inability to sleep at night.  Able to complete full sentences during interview.  Respiratory rate ~ 24-32 bpm at rest; SPO2 96%.  Primary Cardiology ([facility]) service updated via secure messaging platform by bedside RN.      Staff to page rapid response for any concerns or acute change in condition/VS.

## 2024-09-08 NOTE — CARE PLAN
The patient's goals for the shift include      The clinical goals for the shift include patient remains stable    Over the shift, the patient did not make progress toward the following goals. Barriers to progression include patient has had low potassium levels this admission. Recommendations to address these barriers include monitor electrolyte levels.

## 2024-09-08 NOTE — PROGRESS NOTES
Internal Medicine Progress Note      Subjective   Overnight notable for bleeding on the back of head that was packed. No longer bleeding this morning. Patient doing well this morning sitting in chair. States he is able to breath better this morning. Denies any chest pain, nausea, vomiting, bloody bowel movements.    Objective     Vitals: I/O:   Vitals:    09/08/24 0810   BP: 140/70   Pulse: 86   Resp: 20   Temp: 36.1 °C (97 °F)   SpO2: 99%      24hr Min/Max:  Temp  Min: 36.1 °C (97 °F)  Max: 37.5 °C (99.5 °F)  Pulse  Min: 84  Max: 91  BP  Min: 114/74  Max: 140/70  Resp  Min: 16  Max: 34  SpO2  Min: 91 %  Max: 99 %   Intake/Output Summary (Last 24 hours) at 9/8/2024 1100  Last data filed at 9/8/2024 0810  Gross per 24 hour   Intake 920 ml   Output 2100 ml   Net -1180 ml         Physical Exam:  General: Awake, alert, orientated. not in any acute distress  HEENT: no scleral icterus or conjunctivitis  Chest: CTAB, normal respiratory effort, no wheezes or crackles, on 2L NC supplemental oxygen. JVD noted to mid neck  Cardiac: Soft heart sounds, RRR, no Murmurs or rubs.   Abdomen: Soft, Distended, NT  EXT: No peripheral edema, no asymmetry noted. Chronic skin changes bilaterally. Peeling of skin on left leg. left arm with notable edema and significantly larger than right arm.   MSK: No focal joint swelling noted  Skin: Ecchymosis of left arm, most prominent around elbow and forearm.   Neuro: AOx4, moving all limbs spontaneously, follows commands  Psych: Coherent thought process, appropriate mood and affect    General Chemistry Labs    Results from last 72 hours   Lab Units 09/08/24  0626 09/07/24  2354 09/07/24  1130 09/07/24  0531 09/06/24  1418 09/06/24  0723   GLUCOSE mg/dL 115* 116*  --  96   < > 95   SODIUM mmol/L 131* 132*  --  131*   < > 134*   POTASSIUM mmol/L 3.9 3.6 3.8 3.6   < > 2.6*   CHLORIDE mmol/L 85* 84*  --  84*   < > 84*   CO2 mmol/L 38* 37*  --  37*   < > 39*   BUN mg/dL 47* 47*  --  46*   < > 45*  "  CREATININE mg/dL 2.65* 2.83*  --  2.36*   < > 2.62*   ANION GAP mmol/L 12 15  --  14   < > 14   EGFR mL/min/1.73m*2 24* 22*  --  27*   < > 24*   CALCIUM mg/dL 8.3* 7.9*  --  8.2*   < > 8.2*   PHOSPHORUS mg/dL 3.3 3.0  --  2.9   < > 3.8   MAGNESIUM mg/dL 1.98  --   --  1.77  --  1.80   ALBUMIN g/dL 3.2* 2.9*  --  3.3*   < > 3.0*   ALK PHOS U/L  --   --   --  89  --   --    ALT U/L  --   --   --  13  --   --    AST U/L  --   --   --  20  --   --    BILIRUBIN TOTAL mg/dL  --   --   --  2.2*  --   --    BILIRUBIN DIRECT mg/dL  --   --   --  0.6*  --   --    PROTEIN TOTAL g/dL  --   --   --  6.6  --   --     < > = values in this interval not displayed.      CBC  Results from last 72 hours   Lab Units 09/08/24  0626 09/07/24  1612 09/07/24  0531 09/06/24  0723   WBC AUTO x10*3/uL 10.6 11.9* 11.6* 11.7*   HEMOGLOBIN g/dL 8.2* 8.3* 7.8* 8.0*   HEMATOCRIT % 26.3* 26.3* 26.2* 26.6*   MCV fL 93 91 96 96   MCH pg 28.9 28.7 28.5 29.0   MCHC g/dL 31.2* 31.6* 29.8* 30.1*   RDW % 17.0* 16.7* 15.8* 15.5*   PLATELETS AUTO x10*3/uL 221 233 241 250   NEUTROS PCT AUTO % 81.2  --  80.6 83.5   LYMPHS PCT AUTO % 9.4  --  9.9 7.1   MONOS PCT AUTO % 7.2  --  7.8 7.3   EOS PCT AUTO % 0.8  --  0.9 1.0     Coagulation Labs        No lab exists for component: \"RETICHBG\"    Cardiac Labs  Results from last 72 hours   Lab Units 09/06/24  1108 09/05/24  1709   [facility] ng/L 112*  --    BNP pg/mL  --  1,448*     Micro/ID:   No results found for: \"URINECULTURE\", \"BLOODCULT\", \"CSFCULTSMEAR\"          Scheduled Medications:  PRN Medications:  Continuous Medications:   acetaZOLAMIDE, 250 mg, oral, Daily  [Held by provider] enoxaparin, 30 mg, subcutaneous, q24h  folic acid, 1 mg, oral, Daily  hydrALAZINE, 25 mg, oral, TID  isosorbide mononitrate ER, 30 mg, oral, Daily  lidocaine PF, 2 mL, infiltration, Once  multivitamin with minerals, 1 tablet, oral, Daily  pantoprazole, 40 mg, intravenous, q12h [facility]  perflutren protein A microsphere, 0.5 mL, " intravenous, Once in imaging  potassium chloride CR, 40 mEq, oral, BID  ramelteon, 8 mg, oral, Nightly  sulfur hexafluoride microsphr, 2 mL, intravenous, Once in imaging  thiamine, 100 mg, oral, Daily     PRN medications: acetaminophen, oxyCODONE, polyethylene glycol, potassium chloride CR, sodium chloride, white petrolatum         Summary of key imaging results from the last 24 hours:    No results found.      Assessment and Plan    Dinesh Mccall is a 79 year old man with unknown medical history presenting for fall with in apparent acute decompensated heart failure of unclear etiology with mild respiratory distress. BNP was 2182, and TTE revealed 15-20%, global hypokinesia of LV, elevated mean LAP, moderate MR, moderate/severe TR, moderately enlarged RV, mild/moderately elevated PAP. He also had severe bilateral LE edema with chronic skin changes and L dorsal mid foot cellulitis likely secondary to ulceration per CT read for which he completed a course of abx. For the ADHF, he was started on a lasix gtt with significant improvement to his dyspnea and BLE /edema. Lasix drip was stopped due to significant hypokalemia and he was also started scheduled K supplementation.       Updates 09/08/24:  -RHC ordered to assess volume status, requested for 9/9  -K 40 mg PO BID scheduled  - IV lasix 160 mg x2 today, once in morning, and once in evening.  - Acetazolamide 250 mg once for contraction alkalosis   - RFP twice a day while being diuresed  - Fluid restriction 1.5L  -Increased Hydralazine 10 TID to 25 mg TID  -GI: defer EGD until after cardiac MRI with stress.  -continue transfusing for Hgb>8 per GI team  -will consider paracentesis for notable ascites on exam.  -coags ordered for concern for liver cirrhosis causing ascites    #Acute Decompensated Systolic Heart Failure (EF 15-20%), unclear etiology, Stage C NYHA class III  #RBBB  #Suspected Pulmonary HTN Type 2  -BNP today 1448 down from 2402 at peak, plan to stop  trending   -Admission weight 230lb, Current weight 193lbs. Dry weight unknown  -CXR 9/3: Questionable slight interval worsening in right-sided effusion/edema.   -TTE 8/26: LVEF 15-20%, global hypokinesia of LV, elevated mean LAP, moderate MR, moderate/severe TR, moderately enlarged RV, mild/moderately elevated PAP   -Liver US 8/25 showed moderate ascites with hepatomegaly  - Cardiology was consulted in past and recommended cardiac MRI stress. Thought etiology is related to post viral syndrome vs ischemic cardiomyopathy. Unable to get cardiac MRI due to Hgb < 9 since admission  Plan:  -IV lasix 160 mg x2 and Acetazolamide 250 mg once.   -Goal net negative 2L fluid balance   -strict I/Os   -1.5 L fluid restriction   -RFP twice a day while being diuressed  -RHC requested for 9/9  -GDMT:            - BB: defer for now iso ADHF            - ACE/ARB/ARNI: defer for now iso KIA            - MRA: defer for now iso KIA            - SGLT2-inhibitor: defer for now. Iso KIA             - Hydralazine 25 mg TID and Imdur 30 mg daily, Goal BP < 140  -notable ascites on exam. Will consider paracentesis today.      #Melena, improving  #Anemia, (Hgb 7-8 while in hospital)  -anemia etiology likely iron deficiency anemia with compounded GI bleed.  -GI consulted on admission but deferred EGD due to decompensated heart failure and until cardiac workup is complete.   - Reports taking 6-8 Advil daily for a year  - Iron 28, Transferrin 263, TIBC sat 8%, ferritin 40 > Iron deficiency  - Haptoglobin 167 and   - last melena noted on 9/2, bowel movement without melena on 9/3  - consented for blood   - total received 5 units pRBCs during this admission.   -Hgb now stable at ~8.   Plan:  - holding lovenox due to GI bleed  - transfuse for Hgb>8 due to GI bleed per GI recommendations   - Transfuse for Hgb > 9 for cardiac MRI with regadenoson when ischemic evaluation is to be obtained  - Continue IV pantoprazole 40mg BID  - Followed up with  GI since patient is clinically improving in regards to decompensated heart failure. They will continue to defer EGD since patient is high risk until cardiac MRI is done  - Type and screen repeat every 3 days while inpatient      #KIA, improving  - etiology presumed cardiorenal due to decompensated heart failure.   - No baseline Cr available   - Admission Cr. 2.2, peak 3.08, now at 2.65  -urine lytes 8/25: urine Na <10, urine Cr 82.3, Urine K/Cr 60   Plan:  -avoid nephrotoxic agents  -CTM creatinine  -RFP twice a day while being diuresed  -replete electrolytes for K>4 and Mg>2    #LUE edema and ecchymosis  - no significant trauma  - new edema and ecchymosis occurred while in hospital  - mild pain with movement at the elbow  - PT 16.2, INR 1.4, PTT 73  - Doppler US of LUE no DVT  Plan:  -Ace band and cold compress  -CTM  -pain regimen: Tylenol 650 mg prn and Oxycodone 5mg prn    #HypoKalemia  - Potassium Sep-6 2.6 down from 4.1 the evening before.   - Patient was on 30 mg/hr lasix drip which has since been held   - Has routinely had bouts of hypokalemia during this admission  -lasix gtt stopped  Plan:  - Scheduled K 40 mg BID  - Fluid restriction of 1500 ml   - PRN K+ tabs for K+ 3.5 or below    #Insomnia  -was on Melatonin 6 mg nighty with continued difficulty sleeping  - Qtc prolongation noted on EKG  - trazodone, mirtazapine not suggested.   -Switched to ramelteon with improvement in sleep quality  Plan:  - continue ramelteon 8mg nightly    #LLE cellulitis, resolved  -Unilateral erythema on LLE. No pus noted.   - X ray of L foot concerning for soft tissue gas  - doppler LE negative for DVT  - CT foot negative for deep space infection or osteomyelitis  - was treated with Vanc and Zosyn and then Vanc and Ceftriaxone from 8/24-8/26  -switched to Bactrim 800mg on 8/26 for a 5 day course, completed    # Disposition: SNF/acute rehab facility  Patient likely not safe to go home for now. Will need social work and APS check  on his house.    Medical Check List   FEN  -Fluids:  PRN cautiously given HFrEF 15-20%  -Electrolytes: PRN, with goals of Mg >2, K>4  -Nutrition: Cardiac diet  Prophylaxis:  -DVT ppx: Held iso concern for GI bleed  -GI ppx/Bowel care: IV Pantoprazole 40mg BID   -Abx: None  -Pain regimen: Tylenol 650mg and Oxycodone 5mg prn  Hardware:  -Drains: Garcia  -Lines: PIV  Social:  -Code: Full Code  -NOK/Surrogate Decision Maker: Skyla Kincaid (Sister) [phone] or [phone]             Ivory Oliva [phone]       Patient seen and staffed with attending provider Dr. Marshall.  Martín Jackson MD  Internal Medicine PGY-1    Disclaimer: Documentation completed with the information available at the time of input. The times in the chart may not be reflective of actual patient care times, interventions, or procedures. Documentation occurs after the physical care of the patient.

## 2024-09-08 NOTE — CARE PLAN
The patient's goals for the shift include      The clinical goals for the shift include Patient to have no complaints of shortness of breath    No complaints of shortness of breath will continue to monitor.        Problem: Skin  Goal: Promote skin healing  Outcome: Progressing  Flowsheets (Taken 9/8/2024 1946)  Promote skin healing: Turn/reposition every 2 hours/use positioning/transfer devices        80

## 2024-09-08 NOTE — NURSING NOTE
9/8/2024 Nursing Note: Patient ambulated to chair, became lethargic returned to bed. Patient became more awake able to answer questions alert and oriented times 4. Heart rate at 130-140's. Seen at bedside by Dr Jackson. Blood pressure 106/70.  Patients martin lasix to be held and to receive 250 bolus of LR. Continue to monitor fluid status.

## 2024-09-08 NOTE — SIGNIFICANT EVENT
09/08/24 1200   Onset Documentation   Rapid Response Initiated By Radar auto page   Location/Room [facility]  (LT [id number])   Pager Time [phone]   Arrival Time 1200   Event End Time 1205   Level II Called No   Primary Reason for Call Radar auto page     Rapid Response Note    Radar auto-page received for a radar score of 6 with the following vital signs:  35.9, 72, 15, 100/53, 95%.  Vital signs were confirmed and reviewed with primary RN.  He is at his current baseline.  There are no indications for interventions by Rapid Response at this time.  RN to contact Rapid Response with any future concerns or signs of clinical decompensation.

## 2024-09-09 VITALS
OXYGEN SATURATION: 98 % | DIASTOLIC BLOOD PRESSURE: 57 MMHG | BODY MASS INDEX: 26.94 KG/M2 | SYSTOLIC BLOOD PRESSURE: 124 MMHG | RESPIRATION RATE: 12 BRPM | TEMPERATURE: 97.9 F | HEIGHT: 71 IN | WEIGHT: 192.46 LBS | HEART RATE: 86 BPM

## 2024-09-09 PROCEDURE — 31500 INSERT EMERGENCY AIRWAY: CPT | Performed by: STUDENT IN AN ORGANIZED HEALTH CARE EDUCATION/TRAINING PROGRAM

## 2024-09-09 PROCEDURE — 2500000001 HC RX 250 WO HCPCS SELF ADMINISTERED DRUGS (ALT 637 FOR MEDICARE OP)

## 2024-09-09 PROCEDURE — 99239 HOSP IP/OBS DSCHRG MGMT >30: CPT

## 2024-09-09 PROCEDURE — 0BH17EZ INSERTION OF ENDOTRACHEAL AIRWAY INTO TRACHEA, VIA NATURAL OR ARTIFICIAL OPENING: ICD-10-PCS | Performed by: STUDENT IN AN ORGANIZED HEALTH CARE EDUCATION/TRAINING PROGRAM

## 2024-09-09 ASSESSMENT — PAIN SCALES - GENERAL
PAINLEVEL_OUTOF10: 4
PAINLEVEL_OUTOF10: 8

## 2024-09-09 ASSESSMENT — PAIN - FUNCTIONAL ASSESSMENT
PAIN_FUNCTIONAL_ASSESSMENT: 0-10
PAIN_FUNCTIONAL_ASSESSMENT: 0-10

## 2024-09-09 ASSESSMENT — PAIN DESCRIPTION - DESCRIPTORS: DESCRIPTORS: SORE;DISCOMFORT

## 2024-09-09 NOTE — DISCHARGE SUMMARY
Discharge Diagnosis  Acute HFrEF (heart failure with reduced ejection fraction) (Multi)    Issues Requiring Follow-Up  Patient  on  at 7:40 AM.  Unknown etiology.  ROSC unable to be achieved after ACLS 40 minutes.    Test Results Pending At Discharge  Pending Labs       Order Current Status    Potassium Collected (24 0955)            Hospital Course  Patient is a 79-year-old male who presented with fall without loss of consciousness or focal deficit on .  Patient had unknown medical history on presentation and presented to ED via EMS.  Physical exam remarkable for right frontal laceration hairline, distant heart sounds, faint breath sounds right worse than left, abdominal distention, severe bilateral lower extremity edema to waist with skin abnormalities, left foot ulceration with maggots.  Patient alert and oriented x 3 on presentation and able to provide insight into condition.  Patient admitted for ADHF, unclear etiology suspect viral myocarditis versus ischemic cardiomyopathy and cellulitis in the setting of failure to thrive.    On  JENAE WILBUR called for ventricular tachycardia.  Patient underwent ACLS for 40 minutes with shock x 3, calcium, bicarb, mag, lidocaine, Amio, tPA, heparin without ROSC achieved.  Etiology of code unknown, family notified time of death 7:40 AM.    Cardiology:  #Acute decompensated systolic heart failure [EF 15 to 20%], unclear etiology, stage C NYHA class III  #RBBB  #Suspected pulmonary hypertension, type II  :: TTE : LVEF 15 to 20%, global hypokinesia of LV, elevated mean LAP, moderate MR, moderate/severe TR, moderately enlarged RV, mild/moderately elevated PAP  :: CXR 9/3: Questionable slight interval worsening right-sided effusion/edema  :: BNP 2402 at peak, downtrending  Patient was started on Lasix gtt. with significant improvement to dyspnea and bilateral lower extremity edema.  Lasix drip discontinued secondary to significant diuresis induced  hypokalemia and patient was started on scheduled potassium supplementation.  Acetazolamide to 50 Mg started for contraction alkalosis.  Patient was scheduled for RHC on 9/9 to evaluate fluid status, not performed secondary to patient expiring.    Gastroenterology:  #Melena, improving  #Anemia [hemoglobin 7-8 on presentation]  Patient reported taking 6-8 Advil daily for 1 year.  Patient received 5 units PRBC during admission and hemoglobin stable at approximately 8 on 9/9.  Anemia etiology suspected likely iron deficiency anemia with compounded GI bleed.  GI was consulted and deferred follow-up EGD to outpatient as patient is high risk until cardiac MRI completed.  Patient had notable ascites on exam and paracentesis was considered to evaluate portal venous gradients to differentiate if ascites and third spacing inpatient caused by possible cirrhosis versus heart failure.  Paracentesis not completed secondary to patient expiring.    Infectious disease:  #Left lower extremity cellulitis  #LUE edema and ecchymosis  Patient presented with unilateral leg orthopnea on left lower extremity, x-ray of left foot concerning for soft tissue gas.  While in hospital patient developed new edema and ecchymosis, Doppler US of LUE negative for DVT.  CT negative for deep space infection or osteomyelitis.  Patient was treated with antibiotics and completed course.        Pertinent Physical Exam At Time of Discharge  Physical Exam  General: Awake, alert, orientated. not in any acute distress  HEENT: no scleral icterus or conjunctivitis  Chest: CTAB, normal respiratory effort, no wheezes or crackles, on 2L NC supplemental oxygen. JVD noted to mid neck  Cardiac: Soft heart sounds, RRR, no Murmurs or rubs.   Abdomen: Soft, Distended, fluid wave present, NT  EXT: No peripheral edema, no asymmetry noted. Chronic skin changes bilaterally. Peeling of skin on left leg. left arm with notable edema and significantly larger than right arm.   MSK:  No focal joint swelling noted  Skin: Ecchymosis of left arm, most prominent around elbow and forearm.   Neuro: AOx4, moving all limbs spontaneously, follows commands  Psych: Coherent thought process, appropriate mood and affect    Home Medications     Medication List      ASK your doctor about these medications     ADVIL DUAL ACTION ORAL   UNABLE TO FIND       Outpatient Follow-Up  No future appointments.    Geetha Rankin MD

## 2024-09-09 NOTE — CODE DOCUMENTATION
Date of code: 9/9/2024    Loss of Pulse Yes  ACLS Initiated Yes  Initial Rhythm INITIAL RHYTMN: ventricular tachycardia  Length of ACLS Performed 40 minutes  Interventions Outside of ACLS 300 J shock -> 360 J shock x3, calcium, bicarb, Mg, lidocaine, amio TPA, heparin  Advanced Airway Yes  Return of Spontaneous Circulation No  Cardiology Notified Yes  Transfer to a Different Location No  Family or Healthcare Power of  Notified Yes  Details of Discussion (I.e. who was notified, changes to code status, etc) Family notified of time of death at 07:40 AM    Please refer to Code flowsheet for additional documentation.

## 2024-09-09 NOTE — PROCEDURES
Intubation    Date/Time: 9/9/2024 7:04 AM    Performed by: Chinmay Felix DO  Authorized by: Chinmay Felix DO    Consent:     Consent obtained:  Emergent situation  Pre-procedure details:     Indications: cardio/pulmonary arrest      Patient status:  Unresponsive    Pharmacologic strategy: none      Induction agents:  None    Paralytics:  None  Procedure details:     Preoxygenation:  Bag valve mask    CPR in progress: yes      Number of attempts:  1  Successful intubation attempt details:     Intubation method:  Oral    Intubation technique: direct      Laryngoscope blade:  Mac 3    Bougie used: no      Grade view: II      Tube size (mm):  7.0    Tube type:  Cuffed    Tube visualized through cords: yes    Placement assessment:     Tube secured with:  ETT chamberlain    Breath sounds:  Equal    Placement verification: colorimetric ETCO2, numeric ETCO2 and waveform ETCO2    Post-procedure details:     Procedure completion:  Tolerated

## 2024-09-09 NOTE — PROCEDURES
During cardiac arrest after initial intubation, with confirmed auscultated breath sounds, and colorimetric change on EtCO2, respiratory therapist concerned for loss of airway. On re-evaluation during ongoing CPR, no EtCO2 detected and no breath sounds heard.     MAC 3 laryngoscope inserted through oropharynx and ET cuff visualized above vocal cords. Cuff deflated and tube visualized through vocal cords. Colorimetric change on ETCO2 was again seen, ETCo2 was detected with waveform on monitor.     Despite above interventions, compressions continued to displace tube. Decision made to reintubate patient. ET removed. MAC 3 larygnscope insterted into oropharynx. Grade I view of cords seen. 8.0 ET tube visualized through vocal cords. Colorimetric change seen on EtCO2 x5 breaths. EtCO2 waveform and value seen on monitor. Bilateral breath sounds auscultated. Tube secured at 24cm.     Chinmay Felix D.O.   Department of Anesthesiology & Perioperative Medicine  CTICU/SICU Critical Care Intensivist

## 2024-09-09 NOTE — CONSULTS
"IR was asked over the weekend to assess portal vein gradient. Per primary team, \"portal venous gradient to differentiate if ascities and third spacing in the patient is being caused by possible cirrhosis or heart failure\" and team informed on-call IR resident that this could be discussed on Monday. Monday morning, discussed that obtaining a paracentesis may be a first step to assess ascites etiology before proceeding with gradient pressures. Unfortunately the patient passed this AM.    The consult was discussed with attending physician, Dr Gonzalez.  "

## 2024-09-10 NOTE — DISCHARGE SUMMARY
Discharge Diagnosis  Acute HFrEF (heart failure with reduced ejection fraction) (Multi)    Issues Requiring Follow-Up  Patient  on  at 7:40 AM.  Unknown etiology.  ROSC unable to be achieved after ACLS 40 minutes.     Discharge Meds     Medication List      ASK your doctor about these medications     ADVIL DUAL ACTION ORAL   UNABLE TO FIND       Test Results Pending At Discharge  Pending Labs       Order Current Status    Potassium Collected ([id number] [id number])            Hospital Course  Patient is a 79-year-old male who presented with fall without loss of consciousness or focal deficit on .  Patient had unknown medical history on presentation and presented to ED via EMS.  Physical exam remarkable for right frontal laceration hairline, distant heart sounds, faint breath sounds right worse than left, abdominal distention, severe bilateral lower extremity edema to waist with skin abnormalities, left foot ulceration with maggots.  Patient alert and oriented x 3 on presentation and able to provide insight into condition.  Patient admitted for ADHF, unclear etiology suspect viral myocarditis versus ischemic cardiomyopathy and cellulitis in the setting of failure to thrive.      On  CODE WILBUR called for ventricular tachycardia.  Patient underwent ACLS for 40 minutes with shock x 3, calcium, bicarb, mag, lidocaine, Amio, tPA, heparin without ROSC achieved.  Etiology of code unknown, family notified time of death 7:40 AM.      Cardiology:   #Acute decompensated systolic heart failure [EF 15 to 20%], unclear etiology, stage C NYHA class III   #RBBB   #Suspected pulmonary hypertension, type II   :: TTE : LVEF 15 to 20%, global hypokinesia of LV, elevated mean LAP, moderate MR, moderate/severe TR, moderately enlarged RV, mild/moderately elevated PAP   :: CXR 9/3: Questionable slight interval worsening right-sided effusion/edema   :: BNP 2402 at peak, downtrending   Patient was started on Lasix gtt. with  significant improvement to dyspnea and bilateral lower extremity edema.  Lasix drip discontinued secondary to significant diuresis induced hypokalemia and patient was started on scheduled potassium supplementation.  Acetazolamide to 50 Mg started for contraction alkalosis.  Patient was scheduled for RHC on 9/9 to evaluate fluid status, not performed secondary to patient expiring.      Gastroenterology:   #Melena, improving   #Anemia [hemoglobin 7-8 on presentation]   Patient reported taking 6-8 Advil daily for 1 year.  Patient received 5 units PRBC during admission and hemoglobin stable at approximately 8 on 9/9.  Anemia etiology suspected likely iron deficiency anemia with compounded GI bleed.  GI was consulted and deferred follow-up EGD to outpatient as patient is high risk until cardiac MRI completed.  Patient had notable ascites on exam and paracentesis was considered to evaluate portal venous gradients to differentiate if ascites and third spacing inpatient caused by possible cirrhosis versus heart failure.  Paracentesis not completed secondary to patient expiring.      Infectious disease:   #Left lower extremity cellulitis   #LUE edema and ecchymosis Patient presented with unilateral leg orthopnea on left lower extremity, x-ray of left foot concerning for soft tissue gas.  While in hospital patient developed new edema and ecchymosis, Doppler US of LUE negative for DVT.  CT negative for deep space infection or osteomyelitis.  Patient was treated with antibiotics and completed course.     Pertinent Physical Exam At Time of Discharge  General: Awake, alert, orientated. not in any acute distress   HEENT: no scleral icterus or conjunctivitis   Chest: CTAB, normal respiratory effort, no wheezes or crackles, on 2L NC supplemental oxygen. JVD noted to mid neck   Cardiac: Soft heart sounds, RRR, no Murmurs or rubs.   Abdomen: Soft, Distended, fluid wave present, NT   EXT: No peripheral edema, no asymmetry noted.  Chronic skin changes bilaterally. Peeling of skin on left leg. left arm with notable edema and significantly larger than right arm.   MSK: No focal joint swelling noted Skin: Ecchymosis of left arm, most prominent around elbow and forearm. Neuro: AOx4, moving all limbs spontaneously, follows commands   Psych: Coherent thought process, appropriate mood and affect     Outpatient Follow-Up  No future appointments.      Filipe Camarillo MD [facility]

## 2024-09-21 NOTE — DOCUMENTATION CLARIFICATION NOTE
"    PATIENT:               LINDA KAUFMAN  ACCT #:                  [id number]  MRN:                       [id number]  :                       1945  ADMIT DATE:       2024 4:15 AM  DISCH DATE:        2024 7:39 AM  RESPONDING PROVIDER #:        [id number]          PROVIDER RESPONSE TEXT:    Acute Kidney Injury    CDI QUERY TEXT:    Clarification        Instruction:    Based on your assessment of the patient and the clinical information, please provide the requested documentation by clicking on the appropriate radio button and enter any additional information if prompted.    Question: Please clarify a diagnosis for the patient renal status    When answering this query, please exercise your independent professional judgment. The fact that a question is being asked, does not imply that any particular answer is desired or expected.    The patient's clinical indicators include:  Clinical Information: Per H and P on , pt is a \"79 year old man with unknown medical history presenting for fall with laceration in apparent acute decompensated heart failure of unclear etiology.\"    Clinical Indicators:  \"KIA vs CKD\" is documented in H and P on  and Progress Notes -, -9/3 and     H and P, : \"In the ED his vitals were remarkable for mild hypertension 144/96, respiratory rate of 20 and poor oxygenation and he was put on 2L NC. Labs notable for... Cr 2.20... BUN 68... UA notable for proteinuria to 30\"    Progress Note, : \"Melena and acute anemia... anemia 2/2 blood loss. Hb trending down... KIA on CKD - no known baseline-KIA improving with gtt today\"    Progress Note, : \"HFrEF workup: pt would like to hold off on workup for now Ctn hydral and isosorbide iso KIA... questionable KIA suspect CKD. May need to accept renal function as is to continue diuresis and start GDMT\"    Progress Note, : \"KIA, improving - etiology presumed cardiorenal due to decompensated heart failure - Admission " "Cr. 2.2, peak 3.08, now at 2.69\"      Creatinine trended, 8/25-9/8: 2.20 on 8/25, 2.59 on 8/26, 2.44 on 8/27, 2.55 on 8/28, 2.49 on 8/29, 2.76 on 8/30, 2.63 on 8/31, 3.08 on 9/1, 2.77 on 9/2, 2.82 on 9/3, 2.76 on 9/4, 2.58 on 9/5, 2.47 on 9/6, 2.83 on 9/7, 2.56 on 9/8    Treatment: Monitoring serial RFP, Monitoring I/Os, Urine chemistry 8/25, Urinalysis 8/25    Risk Factors: Decompensated Heart Failure, HTN, Anemia  Options provided:  -- KIA on CKD, Please specify stage below  -- Acute Kidney Injury  -- Chronic Kidney Disease, Please specify stage below  -- Other - I will add my own diagnosis  -- Refer to Clinical Documentation Reviewer    Query created by: Yanelis Stone on 9/18/2024 3:53 PM      Electronically signed by:  MARIAH ROBERTSON MD 9/20/2024 8:45 PM          "